# Patient Record
Sex: FEMALE | Race: OTHER | NOT HISPANIC OR LATINO | Employment: FULL TIME | URBAN - METROPOLITAN AREA
[De-identification: names, ages, dates, MRNs, and addresses within clinical notes are randomized per-mention and may not be internally consistent; named-entity substitution may affect disease eponyms.]

---

## 2017-07-25 ENCOUNTER — GENERIC CONVERSION - ENCOUNTER (OUTPATIENT)
Dept: OTHER | Facility: OTHER | Age: 42
End: 2017-07-25

## 2017-07-25 ENCOUNTER — ALLSCRIPTS OFFICE VISIT (OUTPATIENT)
Dept: OTHER | Facility: OTHER | Age: 42
End: 2017-07-25

## 2017-07-27 ENCOUNTER — TRANSCRIBE ORDERS (OUTPATIENT)
Dept: LAB | Facility: CLINIC | Age: 42
End: 2017-07-27

## 2017-07-27 ENCOUNTER — LAB (OUTPATIENT)
Dept: LAB | Facility: CLINIC | Age: 42
End: 2017-07-27
Payer: COMMERCIAL

## 2017-07-27 DIAGNOSIS — R53.83 FATIGUE, UNSPECIFIED TYPE: ICD-10-CM

## 2017-07-27 DIAGNOSIS — R53.83 FATIGUE, UNSPECIFIED TYPE: Primary | ICD-10-CM

## 2017-07-27 DIAGNOSIS — R14.0 ABDOMINAL DISTENTION: ICD-10-CM

## 2017-07-27 DIAGNOSIS — E78.1 PURE HYPERGLYCERIDEMIA: ICD-10-CM

## 2017-07-27 LAB
25(OH)D3 SERPL-MCNC: 6.9 NG/ML (ref 30–100)
ALBUMIN SERPL BCP-MCNC: 4 G/DL (ref 3.5–5)
ALP SERPL-CCNC: 60 U/L (ref 46–116)
ALT SERPL W P-5'-P-CCNC: 23 U/L (ref 12–78)
ANION GAP SERPL CALCULATED.3IONS-SCNC: 11 MMOL/L (ref 4–13)
AST SERPL W P-5'-P-CCNC: 16 U/L (ref 5–45)
BASOPHILS # BLD AUTO: 0.04 THOUSANDS/ΜL (ref 0–0.1)
BASOPHILS NFR BLD AUTO: 1 % (ref 0–1)
BILIRUB SERPL-MCNC: 0.72 MG/DL (ref 0.2–1)
BUN SERPL-MCNC: 10 MG/DL (ref 5–25)
CALCIUM SERPL-MCNC: 8.9 MG/DL (ref 8.3–10.1)
CHLORIDE SERPL-SCNC: 105 MMOL/L (ref 100–108)
CHOLEST SERPL-MCNC: 168 MG/DL (ref 50–200)
CO2 SERPL-SCNC: 24 MMOL/L (ref 21–32)
CREAT SERPL-MCNC: 0.67 MG/DL (ref 0.6–1.3)
EOSINOPHIL # BLD AUTO: 0.21 THOUSAND/ΜL (ref 0–0.61)
EOSINOPHIL NFR BLD AUTO: 3 % (ref 0–6)
ERYTHROCYTE [DISTWIDTH] IN BLOOD BY AUTOMATED COUNT: 12.9 % (ref 11.6–15.1)
GFR SERPL CREATININE-BSD FRML MDRD: 108 ML/MIN/1.73SQ M
GLUCOSE P FAST SERPL-MCNC: 103 MG/DL (ref 65–99)
HCT VFR BLD AUTO: 40.3 % (ref 34.8–46.1)
HDLC SERPL-MCNC: 38 MG/DL (ref 40–60)
HGB BLD-MCNC: 13.9 G/DL (ref 11.5–15.4)
LDLC SERPL CALC-MCNC: 73 MG/DL (ref 0–100)
LYMPHOCYTES # BLD AUTO: 2.22 THOUSANDS/ΜL (ref 0.6–4.47)
LYMPHOCYTES NFR BLD AUTO: 34 % (ref 14–44)
MCH RBC QN AUTO: 29.6 PG (ref 26.8–34.3)
MCHC RBC AUTO-ENTMCNC: 34.5 G/DL (ref 31.4–37.4)
MCV RBC AUTO: 86 FL (ref 82–98)
MONOCYTES # BLD AUTO: 0.5 THOUSAND/ΜL (ref 0.17–1.22)
MONOCYTES NFR BLD AUTO: 8 % (ref 4–12)
NEUTROPHILS # BLD AUTO: 3.55 THOUSANDS/ΜL (ref 1.85–7.62)
NEUTS SEG NFR BLD AUTO: 54 % (ref 43–75)
NRBC BLD AUTO-RTO: 0 /100 WBCS
PLATELET # BLD AUTO: 246 THOUSANDS/UL (ref 149–390)
PMV BLD AUTO: 11.6 FL (ref 8.9–12.7)
POTASSIUM SERPL-SCNC: 4.1 MMOL/L (ref 3.5–5.3)
PROT SERPL-MCNC: 7.3 G/DL (ref 6.4–8.2)
RBC # BLD AUTO: 4.7 MILLION/UL (ref 3.81–5.12)
SODIUM SERPL-SCNC: 140 MMOL/L (ref 136–145)
TRIGL SERPL-MCNC: 284 MG/DL
TSH SERPL DL<=0.05 MIU/L-ACNC: 1.76 UIU/ML (ref 0.36–3.74)
VIT B12 SERPL-MCNC: 109 PG/ML (ref 100–900)
WBC # BLD AUTO: 6.53 THOUSAND/UL (ref 4.31–10.16)

## 2017-07-27 PROCEDURE — 80061 LIPID PANEL: CPT

## 2017-07-27 PROCEDURE — 84443 ASSAY THYROID STIM HORMONE: CPT

## 2017-07-27 PROCEDURE — 80053 COMPREHEN METABOLIC PANEL: CPT

## 2017-07-27 PROCEDURE — 86255 FLUORESCENT ANTIBODY SCREEN: CPT

## 2017-07-27 PROCEDURE — 82306 VITAMIN D 25 HYDROXY: CPT

## 2017-07-27 PROCEDURE — 85025 COMPLETE CBC W/AUTO DIFF WBC: CPT

## 2017-07-27 PROCEDURE — 82607 VITAMIN B-12: CPT

## 2017-07-27 PROCEDURE — 82784 ASSAY IGA/IGD/IGG/IGM EACH: CPT

## 2017-07-27 PROCEDURE — 36415 COLL VENOUS BLD VENIPUNCTURE: CPT

## 2017-07-27 PROCEDURE — 83516 IMMUNOASSAY NONANTIBODY: CPT

## 2017-07-28 ENCOUNTER — GENERIC CONVERSION - ENCOUNTER (OUTPATIENT)
Dept: OTHER | Facility: OTHER | Age: 42
End: 2017-07-28

## 2017-07-28 LAB
ENDOMYSIUM IGA SER QL: NEGATIVE
GLIADIN PEPTIDE IGA SER-ACNC: 9 UNITS (ref 0–19)
GLIADIN PEPTIDE IGG SER-ACNC: 2 UNITS (ref 0–19)
IGA SERPL-MCNC: 191 MG/DL (ref 87–352)
TTG IGA SER-ACNC: <2 U/ML (ref 0–3)
TTG IGG SER-ACNC: <2 U/ML (ref 0–5)

## 2017-07-29 ENCOUNTER — GENERIC CONVERSION - ENCOUNTER (OUTPATIENT)
Dept: OTHER | Facility: OTHER | Age: 42
End: 2017-07-29

## 2017-09-19 ENCOUNTER — ALLSCRIPTS OFFICE VISIT (OUTPATIENT)
Dept: OTHER | Facility: OTHER | Age: 42
End: 2017-09-19

## 2018-01-10 NOTE — MISCELLANEOUS
Message  Return to work or school:   Dorene Hartman is under my professional care  She was seen in my office on 7/25/17   She is able to return to work on  7/26/17  [facility]       Signatures   Electronically signed by : Reina Belcher MD; Aug  2 2017 10:23AM EST                       (Author)    Electronically signed by : Reina Belcher MD; Aug  2 2017 10:23AM EST                       (Author)

## 2018-01-13 VITALS
HEIGHT: 64 IN | WEIGHT: 173.5 LBS | BODY MASS INDEX: 29.62 KG/M2 | SYSTOLIC BLOOD PRESSURE: 110 MMHG | HEART RATE: 58 BPM | RESPIRATION RATE: 16 BRPM | DIASTOLIC BLOOD PRESSURE: 70 MMHG | TEMPERATURE: 96.4 F

## 2018-01-13 NOTE — RESULT NOTES
Verified Results  (1) CELIAC DISEASE AB PROFILE [id number] 07:50AM Jolie Griffiths     Test Name Result Flag Reference   tTG IGG <2 U/mL  0 - 5   Negative        0 - 5                                Weak Positive   6 - 9                                Positive           >9   tTG IGA <2 U/mL  0 - 3   Negative        0 -  3                                Weak Positive   4 - 10                                Positive           >10   Tissue Transglutaminase (tTG) has been identified   as the endomysial antigen  Studies have demonstr-   ated that endomysial IgA antibodies have over 99%   specificity for gluten sensitive enteropathy     GLIADA 9 units  0 - 19   Negative                   0 - 19                     Weak Positive             20 - 30                     Moderate to Strong Positive   >30   GLIADG 2 units  0 - 19   Negative                   0 - 19                     Weak Positive             20 - 30                     Moderate to Strong Positive   >30   ENDOMYSIAL AB IGA Negative  Negative   Performed at:  [facility] [address]  [id number]  : Christian Schmitt MD, Phone:  [phone]    mg/dL  87 - 852

## 2018-01-14 VITALS
TEMPERATURE: 97.1 F | RESPIRATION RATE: 16 BRPM | BODY MASS INDEX: 30.07 KG/M2 | HEIGHT: 64 IN | DIASTOLIC BLOOD PRESSURE: 72 MMHG | HEART RATE: 62 BPM | SYSTOLIC BLOOD PRESSURE: 104 MMHG | WEIGHT: 176.13 LBS

## 2018-01-16 NOTE — RESULT NOTES
Verified Results  (1) CBC/PLT/DIFF [id number] 07:50AM Jolie Griffiths     Test Name Result Flag Reference   WBC COUNT 6 53 Thousand/uL  4 31-10 16   RBC COUNT 4 70 Million/uL  3 81-5 12   HEMOGLOBIN 13 9 g/dL  11 5-15 4   HEMATOCRIT 40 3 %  34 8-46  1   MCV 86 fL  82-98   MCH 29 6 pg  26 8-34 3   MCHC 34 5 g/dL  31 4-37 4   RDW 12 9 %  11 6-15 1   MPV 11 6 fL  8 9-12 7   PLATELET COUNT 311 Thousands/uL  149-390   nRBC AUTOMATED 0 /100 WBCs     NEUTROPHILS RELATIVE PERCENT 54 %  43-75   LYMPHOCYTES RELATIVE PERCENT 34 %  14-44   MONOCYTES RELATIVE PERCENT 8 %  4-12   EOSINOPHILS RELATIVE PERCENT 3 %  0-6   BASOPHILS RELATIVE PERCENT 1 %  0-1   NEUTROPHILS ABSOLUTE COUNT 3 55 Thousands/? ??L  1 85-7 62   LYMPHOCYTES ABSOLUTE COUNT 2 22 Thousands/? ??L  0 60-4 47   MONOCYTES ABSOLUTE COUNT 0 50 Thousand/? ??L  0 17-1 22   EOSINOPHILS ABSOLUTE COUNT 0 21 Thousand/? ??L  0 00-0 61   BASOPHILS ABSOLUTE COUNT 0 04 Thousands/? ??L  0 00-0 10   This bloodwork is non-fasting  Please drink two glasses of water morning of  bloodwork  (1) VITAMIN D 25-HYDROXY [id number] 07:50AM Jolie Griffiths     Test Name Result Flag Reference   VIT D 25-HYDROX 6 9 ng/mL L 30 0-100 0   This assay is a certified procedure of the CDC Vitamin D Standardization Certification Program (VDSCP)     Deficiency <20ng/ml   Insufficiency 20-30ng/ml   Sufficient  ng/ml     *Patients undergoing fluorescein dye angiography may retain small amounts of fluorescein in the body for 48-72 hours post procedure  Samples containing fluorescein can produce falsely elevated Vitamin D values  If the patient had this procedure, a specimen should be resubmitted post fluorescein clearance       (1) COMPREHENSIVE METABOLIC PANEL [id number] 67:63VC Radha Belch     Test Name Result Flag Reference   SODIUM 140 mmol/L  136-145   POTASSIUM 4 1 mmol/L  3 5-5 3   CHLORIDE 105 mmol/L  100-108   CARBON DIOXIDE 24 mmol/L  21-32   ANION GAP (CALC) 11 mmol/L  4-13   BLOOD UREA NITROGEN 10 mg/dL  5-25   CREATININE 0 67 mg/dL  0 60-1 30   Standardized to IDMS reference method   CALCIUM 8 9 mg/dL  8 3-10 1   BILI, TOTAL 0 72 mg/dL  0 20-1 00   ALK PHOSPHATAS 60 U/L     ALT (SGPT) 23 U/L  12-78   AST(SGOT) 16 U/L  5-45   ALBUMIN 4 0 g/dL  3 5-5 0   TOTAL PROTEIN 7 3 g/dL  6 4-8 2   eGFR 108 ml/min/1 73sq m     [facility] Disease Education Program recommendations are as follows:  GFR calculation is accurate only with a steady state creatinine  Chronic Kidney disease less than 60 ml/min/1 73 sq  meters  Kidney failure less than 15 ml/min/1 73 sq  meters  GLUCOSE FASTING 103 mg/dL H 65-99     (1) VITAMIN B12 [id number] 07:50AM Jolie Griffiths     Test Name Result Flag Reference   VITAMIN B12 109 pg/mL  100-900     (1) LIPID PANEL FASTING W DIRECT LDL REFLEX [id number] 07:50AM Jolie Griffiths     Test Name Result Flag Reference   CHOLESTEROL 168 mg/dL     LDL CHOLESTEROL CALCULATED 73 mg/dL  0-100   This is a fasting blood test  Water,black tea or black  coffee only after 9:00pm the night before test  Drink 2 glasses of water the morning of test         Triglyceride:         Normal              <150 mg/dl       Borderline High    150-199 mg/dl       High               200-499 mg/dl       Very High          >499 mg/dl  Cholesterol:         Desirable        <200 mg/dl      Borderline High  200-239 mg/dl      High             >239 mg/dl  HDL Cholesterol:        High    >59 mg/dL      Low     <41 mg/dL  LDL Cholesterol:        Optimal          <100 mg/dl        Near Optimal     100-129 mg/dl        Above Optimal          Borderline High   130-159 mg/dl          High              160-189 mg/dl          Very High        >189 mg/dl  LDL CALCULATED:    This screening LDL is a calculated result  It does not have the accuracy of the Direct Measured LDL in the monitoring of patients with hyperlipidemia and/or statin therapy     Direct Measure LDL ([id number]) must be ordered separately in these patients  TRIGLYCERIDES 284 mg/dL H <=150   Specimen collection should occur prior to N-Acetylcysteine or Metamizole administration due to the potential for falsely depressed results  HDL,DIRECT 38 mg/dL L 40-60   Specimen collection should occur prior to Metamizole administration due to the potential for falsely depressed results  (1) TSH WITH FT4 REFLEX [id number] 07:50AM Jolie Griffiths     Test Name Result Flag Reference   TSH 1 760 uIU/mL  0 358-3 740   Patients undergoing fluorescein dye angiography may retain small amounts of fluorescein in the body for 48-72 hours post procedure  Samples containing fluorescein can produce falsely depressed TSH values  If the patient had this procedure,a specimen should be resubmitted post fluorescein clearance            The recommended reference ranges for TSH during pregnancy are as follows:  First trimester 0 1 to 2 5 uIU/mL  Second trimester  0 2 to 3 0 uIU/mL  Third trimester 0 3 to 3 0 uIU/m

## 2018-01-30 ENCOUNTER — LAB (OUTPATIENT)
Dept: LAB | Facility: CLINIC | Age: 43
End: 2018-01-30

## 2018-01-30 ENCOUNTER — OFFICE VISIT (OUTPATIENT)
Dept: FAMILY MEDICINE CLINIC | Facility: CLINIC | Age: 43
End: 2018-01-30
Payer: COMMERCIAL

## 2018-01-30 VITALS
TEMPERATURE: 97.7 F | BODY MASS INDEX: 30.35 KG/M2 | SYSTOLIC BLOOD PRESSURE: 110 MMHG | HEART RATE: 82 BPM | WEIGHT: 177.8 LBS | RESPIRATION RATE: 16 BRPM | DIASTOLIC BLOOD PRESSURE: 78 MMHG | HEIGHT: 64 IN

## 2018-01-30 DIAGNOSIS — E78.1 HYPERTRIGLYCERIDEMIA: ICD-10-CM

## 2018-01-30 DIAGNOSIS — R68.89 FLU-LIKE SYMPTOMS: Primary | ICD-10-CM

## 2018-01-30 DIAGNOSIS — R73.9 ELEVATED BLOOD SUGAR: ICD-10-CM

## 2018-01-30 DIAGNOSIS — E55.9 VITAMIN D DEFICIENCY: ICD-10-CM

## 2018-01-30 LAB
25(OH)D3 SERPL-MCNC: 12.1 NG/ML (ref 30–100)
EST. AVERAGE GLUCOSE BLD GHB EST-MCNC: 137 MG/DL
HBA1C MFR BLD: 6.4 % (ref 4.2–6.3)

## 2018-01-30 PROCEDURE — 99214 OFFICE O/P EST MOD 30 MIN: CPT | Performed by: FAMILY MEDICINE

## 2018-01-30 PROCEDURE — 83036 HEMOGLOBIN GLYCOSYLATED A1C: CPT | Performed by: FAMILY MEDICINE

## 2018-01-30 PROCEDURE — 82306 VITAMIN D 25 HYDROXY: CPT | Performed by: FAMILY MEDICINE

## 2018-01-30 PROCEDURE — 36415 COLL VENOUS BLD VENIPUNCTURE: CPT | Performed by: FAMILY MEDICINE

## 2018-01-30 RX ORDER — OSELTAMIVIR PHOSPHATE 75 MG/1
75 CAPSULE ORAL EVERY 12 HOURS SCHEDULED
Qty: 10 CAPSULE | Refills: 0 | Status: SHIPPED | OUTPATIENT
Start: 2018-01-30 | End: 2018-02-03

## 2018-01-30 RX ORDER — ERGOCALCIFEROL 1.25 MG/1
CAPSULE ORAL
COMMUNITY
Start: 2017-07-28 | End: 2018-02-27 | Stop reason: SDUPTHER

## 2018-01-30 RX ORDER — TIZANIDINE 2 MG/1
TABLET ORAL
Refills: 0 | COMMUNITY
Start: 2017-11-13 | End: 2018-03-27 | Stop reason: ALTCHOICE

## 2018-01-30 RX ORDER — ACETAMINOPHEN 500 MG
500 TABLET ORAL AS NEEDED
COMMUNITY

## 2018-01-30 RX ORDER — ERGOCALCIFEROL 1.25 MG/1
CAPSULE ORAL
Refills: 0 | COMMUNITY
Start: 2017-11-10 | End: 2018-02-27 | Stop reason: SDUPTHER

## 2018-01-30 RX ORDER — IBUPROFEN 200 MG
200 TABLET ORAL EVERY 6 HOURS
COMMUNITY
End: 2018-03-27 | Stop reason: ALTCHOICE

## 2018-01-30 NOTE — ASSESSMENT & PLAN NOTE
Patient had recent wellness labs done through her work which showed an elevated non fasting blood sugar of 172  I will order an A1c  Will notify patient of results

## 2018-01-30 NOTE — ASSESSMENT & PLAN NOTE
Patient had blood work done for wellness through her work and was noted to have triglycerides were 500  I have strongly advised on limiting foods better fried, sugar, butter  I would like patient to start taking fish oil supplement, Nordic nodules, 1200 milligrams twice daily  I would like to repeat lipid panel in 3 months  Rx has been provided  I would also like patient to start routine exercise regimen as well  Return parameters discussed

## 2018-01-30 NOTE — PROGRESS NOTES
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 [employer]  She was started on Tamiflu however has been only taking 1 tablet daily  I will call in a new Rx for patient to take twice daily for 5 days  Patient is agreeable with this plan  Continue with symptomatic treatment and supportive measures including adequate hydration  Return parameters discussed  Relevant Medications    oseltamivir (TAMIFLU) 75 mg capsule    Hypertriglyceridemia       Patient had blood work done for wellness through her work and was noted to have triglycerides were 500  I have strongly advised on limiting foods better fried, sugar, butter  I would like patient to start taking fish oil supplement, Nordic nodules, 1200 milligrams twice daily  I would like to repeat lipid panel in 3 months  Rx has been provided  I would also like patient to start routine exercise regimen as well  Return parameters discussed  Relevant Orders    Lipid Panel with Direct LDL reflex    Elevated blood sugar       Patient had recent wellness labs done through her work which showed an elevated non fasting blood sugar of 172  I will order an A1c  Will notify patient of results  Relevant Orders    Hemoglobin A1c (Completed)    Vitamin D deficiency       Patient has history of vitamin-D deficiency, will recheck vitamin-D level  Relevant Orders    Vitamin D 25 hydroxy (Completed)            There are no Patient Instructions on file for this visit          Chief Complaint     Chief Complaint   Patient presents with    Flu Symptoms     started thursday night       History of Present Illness     HPI   Patient presents with symptoms of runny nose, fever, sneezing, body aches, decreased appetite, nasal congestion  Took robitussin, tyelenol  Works at [employer], has been exposed to flu  Was started on tamiflu 2 days ago, taking 1 capsule daily   patient also states she had wellness blood work done last week through her work which showed elevated triglycerides and blood sugar  She is concerned about this  In addition, patient changed jobs last year and during work physical, was noted to have change in PPD status  Patient did have an x-ray of the chest which was normal   Patient did see [facility] who stated she would be okay either taking medication or not  Patient did take 1 month of TB meds however could not tolerate side effects and discontinued  Patient was told it was okay that she discontinued the medications  Review of Systems   Review of Systems   Constitutional: Positive for appetite change and fever  Negative for activity change  HENT: Positive for congestion, rhinorrhea and sneezing  Musculoskeletal: Positive for myalgias  All other systems reviewed and are negative  Active Problem List     Patient Active Problem List   Diagnosis    Flu-like symptoms    Hypertriglyceridemia    Elevated blood sugar    Vitamin D deficiency       Past Medical History:  No past medical history on file  Past Surgical History:  Past Surgical History:   Procedure Laterality Date     SECTION      CHOLECYSTECTOMY      TOTAL ABDOMINAL HYSTERECTOMY      Due to fibroids  Ovaries present       Family History:  Family History   Problem Relation Age of Onset    Diabetes Mother     Hypertension Mother     Heart attack Mother     Stroke Father        Social History:  Social History     Social History    Marital status: /Civil Union     Spouse name: N/A    Number of children: N/A    Years of education: N/A     Occupational History    Not on file       Social History Main Topics    Smoking status: Never Smoker    Smokeless tobacco: Not on file    Alcohol use No    Drug use: Unknown    Sexual activity: Not on file     Other Topics Concern    Not on file     Social History Narrative    Caffeine use    Description: Tea 1 cup           Objective     Vitals:    01/30/18 1105   BP: 110/78   Pulse: 82   Resp: 16   Temp: 97 7 °F (36 5 °C)     Wt Readings from Last 3 Encounters:   01/30/18 80 6 kg (177 lb 12 8 oz)   09/19/17 79 9 kg (176 lb 2 1 oz)   07/25/17 78 7 kg (173 lb 8 oz)       Physical Exam   Constitutional: She is oriented to person, place, and time  She appears well-developed and well-nourished  HENT:   Head: Normocephalic and atraumatic  Mouth/Throat: No oropharyngeal exudate  Mild erythema noted in posterior oropharynx  Postnasal drainage noted as well  Cardiovascular: Normal rate and regular rhythm  Pulmonary/Chest: Effort normal and breath sounds normal  She has no wheezes  Lymphadenopathy:     She has no cervical adenopathy  Neurological: She is alert and oriented to person, place, and time  Skin: No rash noted         Pertinent Laboratory/Diagnostic Studies:  Lab Results   Component Value Date    BUN 10 07/27/2017    CREATININE 0 67 07/27/2017    CALCIUM 8 9 07/27/2017     07/27/2017    K 4 1 07/27/2017    CO2 24 07/27/2017     07/27/2017     Lab Results   Component Value Date    ALT 23 07/27/2017    AST 16 07/27/2017    ALKPHOS 60 07/27/2017    BILITOT 0 72 07/27/2017       Lab Results   Component Value Date    WBC 6 53 07/27/2017    HGB 13 9 07/27/2017    HCT 40 3 07/27/2017    MCV 86 07/27/2017     07/27/2017       No results found for: TSH    Lab Results   Component Value Date    CHOL 168 07/27/2017     Lab Results   Component Value Date    TRIG 284 (H) 07/27/2017     Lab Results   Component Value Date    HDL 38 (L) 07/27/2017     Lab Results   Component Value Date    LDLCALC 73 07/27/2017     Lab Results   Component Value Date    HGBA1C 6 4 (H) 01/30/2018       Results for orders placed or performed in visit on 01/30/18   Hemoglobin A1c   Result Value Ref Range    Hemoglobin A1C 6 4 (H) 4 2 - 6 3 %     mg/dl   Vitamin D 25 hydroxy   Result Value Ref Range    Vit D, 25-Hydroxy 12 1 (L) 30 0 - 100 0 ng/mL       Orders Placed This Encounter   Procedures    Lipid Panel with Direct LDL reflex    Hemoglobin A1c    Vitamin D 25 hydroxy       ALLERGIES:  Allergies   Allergen Reactions    Pollen Extract Itching     Itchy, runny nose       Current Medications     Current Outpatient Prescriptions   Medication Sig Dispense Refill    acetaminophen (TYLENOL) 500 mg tablet Take 500 mg by mouth every 6 (six) hours      ergocalciferol (VITAMIN D2) 50,000 units TAKE 1 CAPSULE WEEKLY FOR 8 WEEKS THEN MONTHLY FOR 2 MONTHS  0    ibuprofen (MOTRIN) 200 mg tablet Take 200 mg by mouth every 6 (six) hours      ergocalciferol (VITAMIN D2) 50,000 units Take by mouth      oseltamivir (TAMIFLU) 75 mg capsule Take 1 capsule (75 mg total) by mouth every 12 (twelve) hours for 4 days 10 capsule 0    tiZANidine (ZANAFLEX) 2 mg tablet TAKE 1 TABLET BY MOUTH NIGHTLY FOR 15 DAYS  0     No current facility-administered medications for this visit  Health Maintenance   There are no preventive care reminders to display for this patient    Immunization History   Administered Date(s) Administered    Fluzone Split Quad 0 5 mL 11/09/2015    Td (adult), adsorbed 03/09/2016    Tdap 01/01/2016       Ivy Olea MD

## 2018-01-30 NOTE — ASSESSMENT & PLAN NOTE
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

## 2018-02-02 ENCOUNTER — TELEPHONE (OUTPATIENT)
Dept: FAMILY MEDICINE CLINIC | Facility: CLINIC | Age: 43
End: 2018-02-02

## 2018-02-02 DIAGNOSIS — E55.9 VITAMIN D DEFICIENCY: Primary | ICD-10-CM

## 2018-02-02 DIAGNOSIS — R73.01 IMPAIRED FASTING GLUCOSE: ICD-10-CM

## 2018-02-02 RX ORDER — ERGOCALCIFEROL 1.25 MG/1
50000 CAPSULE ORAL WEEKLY
Qty: 4 CAPSULE | Refills: 2 | Status: SHIPPED | OUTPATIENT
Start: 2018-02-02 | End: 2018-03-27 | Stop reason: SDUPTHER

## 2018-02-02 NOTE — PROGRESS NOTES
Can you please let patient know that her vitamin-D was in the deficient range  I will call in a prescription to her pharmacy to start taking the vitamin-D supplementation  I would like to recheck the vitamin-D level in 3 months and will print this out  In addition, her blood sugar was in the prediabetic range  Normal is below  5 7 and her number is 6 4  I would like patient to limit her intake of carbohydrates, sweets and starting routine exercise regimen  I would like to repeat her hemoglobin A1c in the next 4 months along with the vitamin D  I will print this out    Thank you

## 2018-02-02 NOTE — TELEPHONE ENCOUNTER
----- Message from Keyla Sanchez MD sent at 2/2/2018  4:50 PM EST -----  Can you please let patient know that her vitamin-D was in the deficient range  I will call in a prescription to her pharmacy to start taking the vitamin-D supplementation  I would like to recheck the vitamin-D level in 3 months and will print this out  In addition, her blood sugar was in the prediabetic range  Normal is below  5 7 and her number is 6 4  I would like patient to limit her intake of carbohydrates, sweets and starting routine exercise regimen  I would like to repeat her hemoglobin A1c in the next 4 months along with the vitamin D  I will print this out    Thank you

## 2018-02-21 ENCOUNTER — HOSPITAL ENCOUNTER (EMERGENCY)
Facility: HOSPITAL | Age: 43
Discharge: HOME | End: 2018-02-21
Attending: EMERGENCY MEDICINE
Payer: COMMERCIAL

## 2018-02-21 ENCOUNTER — APPOINTMENT (EMERGENCY)
Dept: RADIOLOGY | Facility: HOSPITAL | Age: 43
End: 2018-02-21
Payer: COMMERCIAL

## 2018-02-21 VITALS
HEART RATE: 67 BPM | BODY MASS INDEX: 30.9 KG/M2 | TEMPERATURE: 98.8 F | WEIGHT: 180 LBS | SYSTOLIC BLOOD PRESSURE: 122 MMHG | RESPIRATION RATE: 18 BRPM | OXYGEN SATURATION: 98 % | DIASTOLIC BLOOD PRESSURE: 71 MMHG

## 2018-02-21 DIAGNOSIS — R07.9 CHEST PAIN: Primary | ICD-10-CM

## 2018-02-21 LAB
ALBUMIN SERPL BCP-MCNC: 4 G/DL (ref 3.5–5)
ALP SERPL-CCNC: 58 U/L (ref 46–116)
ALT SERPL W P-5'-P-CCNC: 26 U/L (ref 12–78)
ANION GAP SERPL CALCULATED.3IONS-SCNC: 6 MMOL/L (ref 4–13)
AST SERPL W P-5'-P-CCNC: 11 U/L (ref 5–45)
ATRIAL RATE: 72 BPM
BASOPHILS # BLD AUTO: 0.02 THOUSANDS/ΜL (ref 0–0.1)
BASOPHILS NFR BLD AUTO: 0 % (ref 0–1)
BILIRUB SERPL-MCNC: 0.73 MG/DL (ref 0.2–1)
BUN SERPL-MCNC: 11 MG/DL (ref 5–25)
CALCIUM SERPL-MCNC: 8.8 MG/DL (ref 8.3–10.1)
CHLORIDE SERPL-SCNC: 103 MMOL/L (ref 100–108)
CO2 SERPL-SCNC: 26 MMOL/L (ref 21–32)
CREAT SERPL-MCNC: 0.71 MG/DL (ref 0.6–1.3)
EOSINOPHIL # BLD AUTO: 0.23 THOUSAND/ΜL (ref 0–0.61)
EOSINOPHIL NFR BLD AUTO: 3 % (ref 0–6)
ERYTHROCYTE [DISTWIDTH] IN BLOOD BY AUTOMATED COUNT: 12.3 % (ref 11.6–15.1)
GFR SERPL CREATININE-BSD FRML MDRD: 104 ML/MIN/1.73SQ M
GLUCOSE SERPL-MCNC: 211 MG/DL (ref 65–140)
HCT VFR BLD AUTO: 39.5 % (ref 34.8–46.1)
HGB BLD-MCNC: 14.4 G/DL (ref 11.5–15.4)
LYMPHOCYTES # BLD AUTO: 2.23 THOUSANDS/ΜL (ref 0.6–4.47)
LYMPHOCYTES NFR BLD AUTO: 32 % (ref 14–44)
MCH RBC QN AUTO: 29.8 PG (ref 26.8–34.3)
MCHC RBC AUTO-ENTMCNC: 36.5 G/DL (ref 31.4–37.4)
MCV RBC AUTO: 82 FL (ref 82–98)
MONOCYTES # BLD AUTO: 0.46 THOUSAND/ΜL (ref 0.17–1.22)
MONOCYTES NFR BLD AUTO: 7 % (ref 4–12)
NEUTROPHILS # BLD AUTO: 3.94 THOUSANDS/ΜL (ref 1.85–7.62)
NEUTS SEG NFR BLD AUTO: 58 % (ref 43–75)
NRBC BLD AUTO-RTO: 0 /100 WBCS
P AXIS: 76 DEGREES
PLATELET # BLD AUTO: 223 THOUSANDS/UL (ref 149–390)
PMV BLD AUTO: 10.3 FL (ref 8.9–12.7)
POTASSIUM SERPL-SCNC: 3.7 MMOL/L (ref 3.5–5.3)
PR INTERVAL: 142 MS
PROT SERPL-MCNC: 7.8 G/DL (ref 6.4–8.2)
QRS AXIS: 89 DEGREES
QRSD INTERVAL: 90 MS
QT INTERVAL: 406 MS
QTC INTERVAL: 444 MS
RBC # BLD AUTO: 4.83 MILLION/UL (ref 3.81–5.12)
SODIUM SERPL-SCNC: 135 MMOL/L (ref 136–145)
T WAVE AXIS: 70 DEGREES
TROPONIN I SERPL-MCNC: <0.02 NG/ML
VENTRICULAR RATE: 72 BPM
WBC # BLD AUTO: 6.89 THOUSAND/UL (ref 4.31–10.16)

## 2018-02-21 PROCEDURE — 99285 EMERGENCY DEPT VISIT HI MDM: CPT

## 2018-02-21 PROCEDURE — 93010 ELECTROCARDIOGRAM REPORT: CPT | Performed by: INTERNAL MEDICINE

## 2018-02-21 PROCEDURE — 93005 ELECTROCARDIOGRAM TRACING: CPT | Performed by: EMERGENCY MEDICINE

## 2018-02-21 PROCEDURE — 36415 COLL VENOUS BLD VENIPUNCTURE: CPT

## 2018-02-21 PROCEDURE — 85025 COMPLETE CBC W/AUTO DIFF WBC: CPT | Performed by: EMERGENCY MEDICINE

## 2018-02-21 PROCEDURE — 84484 ASSAY OF TROPONIN QUANT: CPT | Performed by: EMERGENCY MEDICINE

## 2018-02-21 PROCEDURE — 71046 X-RAY EXAM CHEST 2 VIEWS: CPT

## 2018-02-21 PROCEDURE — 80053 COMPREHEN METABOLIC PANEL: CPT | Performed by: EMERGENCY MEDICINE

## 2018-02-21 RX ORDER — KETOROLAC TROMETHAMINE 30 MG/ML
15 INJECTION, SOLUTION INTRAMUSCULAR; INTRAVENOUS ONCE
Status: DISCONTINUED | OUTPATIENT
Start: 2018-02-21 | End: 2018-02-21 | Stop reason: HOSPADM

## 2018-02-21 NOTE — DISCHARGE INSTRUCTIONS
Your testing including EKG, chest xray, and blood work were normal  Please follow up with your primary doctor for further management  Return to the ER for new or worrisome symptoms  Chest Pain   WHAT YOU NEED TO KNOW:   Chest pain can be caused by a range of conditions, from not serious to life-threatening  Chest pain can be a symptom of a digestive problem, such as acid reflux or a stomach ulcer  An anxiety attack or a strong emotion, such as anger, can also cause chest pain  Infection, inflammation, or a fracture in the bones or cartilage in your chest can cause pain or discomfort  Sometimes chest pain or pressure is caused by poor blood flow to your heart (angina)  Chest pain may also be caused by life-threatening conditions such as a heart attack or blood clot in your lungs  DISCHARGE INSTRUCTIONS:   Call 911 if:   · You have any of the following signs of a heart attack:      ¨ Squeezing, pressure, or pain in your chest that lasts longer than 5 minutes or returns    ¨ Discomfort or pain in your back, neck, jaw, stomach, or arm     ¨ Trouble breathing    ¨ Nausea or vomiting    ¨ Lightheadedness or a sudden cold sweat, especially with chest pain or trouble breathing    Return to the emergency department if:   · You have chest discomfort that gets worse, even with medicine  · You cough or vomit blood  · Your bowel movements are black or bloody  · You cannot stop vomiting, or it hurts to swallow  Contact your healthcare provider if:   · You have questions or concerns about your condition or care  Medicines:   · Medicines  may be given to treat the cause of your chest pain  Examples include pain medicine, anxiety medicine, or medicines to increase blood flow to your heart  · Do not take certain medicines without asking your healthcare provider first   These include NSAIDs, herbal or vitamin supplements, or hormones (estrogen or progestin)  · Take your medicine as directed    Contact your healthcare provider if you think your medicine is not helping or if you have side effects  Tell him or her if you are allergic to any medicine  Keep a list of the medicines, vitamins, and herbs you take  Include the amounts, and when and why you take them  Bring the list or the pill bottles to follow-up visits  Carry your medicine list with you in case of an emergency  Follow up with your healthcare provider within 72 hours, or as directed: You may need to return for more tests to find the cause of your chest pain  You may be referred to a specialist, such as a cardiologist or gastroenterologist  Write down your questions so you remember to ask them during your visits  Healthy living tips: The following are general healthy guidelines  If your chest pain is caused by a heart problem, your healthcare provider will give you specific guidelines to follow  · Do not smoke  Nicotine and other chemicals in cigarettes and cigars can cause lung and heart damage  Ask your healthcare provider for information if you currently smoke and need help to quit  E-cigarettes or smokeless tobacco still contain nicotine  Talk to your healthcare provider before you use these products  · Eat a variety of healthy, low-fat foods  Healthy foods include fruits, vegetables, whole-grain breads, low-fat dairy products, beans, lean meats, and fish  Ask for more information about a heart healthy diet  · Ask about activity  Your healthcare provider will tell you which activities to limit or avoid  Ask when you can drive, return to work, and have sex  Ask about the best exercise plan for you  · Maintain a healthy weight  Ask your healthcare provider how much you should weigh  Ask him or her to help you create a weight loss plan if you are overweight  · Get the flu and pneumonia vaccines  All adults should get the influenza (flu) vaccine  Get it every year as soon as it becomes available   The pneumococcal vaccine is given to adults aged 72 years or older  The vaccine is given every 5 years to prevent pneumococcal disease, such as pneumonia  © 2017 [employer] Information is for End User's use only and may not be sold, redistributed or otherwise used for commercial purposes  All illustrations and images included in CareNotes® are the copyrighted property of A [employer]  or [employer]  The above information is an  only  It is not intended as medical advice for individual conditions or treatments  Talk to your doctor, nurse or pharmacist before following any medical regimen to see if it is safe and effective for you

## 2018-02-21 NOTE — ED NOTES
EKG completed at 10:08, viewed and signed by Dr Wilfred Camarillo, copy on chart     [facility]  02/21/18 101

## 2018-02-21 NOTE — ED ATTENDING ATTESTATION
Jeanmarie Mills MD, saw and evaluated the patient  I have discussed the patient with the resident/non-physician practitioner and agree with the resident's/non-physician practitioner's findings, Plan of Care, and MDM as documented in the resident's/non-physician practitioner's note, except where noted  All available labs and Radiology studies were reviewed  At this point I agree with the current assessment done in the Emergency Department  I have conducted an independent evaluation of this patient a history and physical is as follows: This is a 76-year-old woman presenting to the emergency department with chest discomfort, some left-sided numbness  Symptoms have been present for about a month  Patient has not had any difficulty walking  She does not have exertional symptoms  She describes the pain as burning  She does not have cardiac risk factors  She does not have PE risk factors  Her review of systems otherwise negative in 12 systems were reviewed  On exam Vital signs were reviewed  Patient is awake, alert, interactive  The patient's pupils are equally round reactive to light  Oropharynx is clear with moist mucous membranes  Neck is supple and nontender with no adenopathy or JVD  Heart is regular with no murmurs, rubs, or gallops  Lungs are clear and equal with no wheezes, rales, or rhonchi  Abdomen is soft and nontender with no masses, rebound, or guarding  There is no CVA tenderness  The patient was completely exposed  There is no skin breakdown  There are no rashes or skin changes  Extremities are warm and well perfused with good pulses  The patient has normal strength, sensation, and cranial nerves  Patient's EKG was reviewed by me, no ischemia or ectopy  Impression:  Atypical chest pain    Agree with documentation      Critical Care Time  CritCare Time    Procedures

## 2018-02-21 NOTE — ED NOTES
Dr James Canonn and Dr Charlie Rose at pt bedside for pt evaluation     Debi Barros RN  40/06/32 [id number]

## 2018-02-21 NOTE — ED PROVIDER NOTES
History  Chief Complaint   Patient presents with    Chest Pain     pt c/o cp x 1 week getting worse now     41-year-old woman with a history of hyperlipidemia presents for evaluation of chest pain  Patient notes a fleeting substernal chest pressure approximately 1 week ago without inciting trauma or strenuous activity  Symptoms resolved that intervention before recurring this morning with associated lightheadedness and left jaw pain  No associated nausea, dyspnea, or diaphoresis  No history of cardiac or VTE disease  No smoking or drug use  No significant family history of cardiac disease  Patient is perc negative with no VTE risk factors  Patient also complaining of left-sided weakness that has been present for months  No associated neurologic symptoms including facial asymmetry, dysarthria, diplopia, dysphagia, and dysequilibrium  On arrival, patient is afebrile with otherwise normal vital signs  Physical exam shows reproduction of chest pain with chest wall palpation and is otherwise unremarkable including a nonfocal neurologic exam   Low suspicion for cardiac disease and VTE  Will check EKG, troponin, chest x-ray, basic labs, and urine pregnancy  Will treat symptomatically with Toradol and reassess  Further management pending above results  Prior to Admission Medications   Prescriptions Last Dose Informant Patient Reported?  Taking?   acetaminophen (TYLENOL) 500 mg tablet   Yes No   Sig: Take 500 mg by mouth every 6 (six) hours   ergocalciferol (VITAMIN D2) 50,000 units   Yes No   Sig: TAKE 1 CAPSULE WEEKLY FOR 8 WEEKS THEN MONTHLY FOR 2 MONTHS   ergocalciferol (VITAMIN D2) 50,000 units   Yes No   Sig: Take by mouth   ergocalciferol (VITAMIN D2) 50,000 units   No No   Sig: Take 1 capsule (50,000 Units total) by mouth once a week for 4 doses   ibuprofen (MOTRIN) 200 mg tablet   Yes No   Sig: Take 200 mg by mouth every 6 (six) hours   tiZANidine (ZANAFLEX) 2 mg tablet   Yes No   Sig: TAKE 1 TABLET BY MOUTH NIGHTLY FOR 15 DAYS      Facility-Administered Medications: None       History reviewed  No pertinent past medical history  Past Surgical History:   Procedure Laterality Date     SECTION      CHOLECYSTECTOMY      TOTAL ABDOMINAL HYSTERECTOMY      Due to fibroids  Ovaries present       Family History   Problem Relation Age of Onset    Diabetes Mother     Hypertension Mother     Heart attack Mother     Stroke Father      I have reviewed and agree with the history as documented  Social History   Substance Use Topics    Smoking status: Never Smoker    Smokeless tobacco: Never Used    Alcohol use No        Review of Systems   Constitutional: Negative for chills and fever  HENT: Negative for rhinorrhea and sore throat  Eyes: Negative for photophobia and visual disturbance  Respiratory: Negative for cough and shortness of breath  Cardiovascular: Positive for chest pain and palpitations  Negative for leg swelling  Gastrointestinal: Negative for abdominal pain, diarrhea, nausea and vomiting  Genitourinary: Negative for dysuria, frequency and hematuria  Musculoskeletal: Negative for back pain and neck pain  Skin: Negative for rash and wound  Neurological: Positive for light-headedness  Negative for headaches  Physical Exam  ED Triage Vitals   Temperature Pulse Respirations Blood Pressure SpO2   18 0955 18 0955 18 0955 18 0955 18 0955   98 8 °F (37 1 °C) 89 18 121/62 96 %      Temp Source Heart Rate Source Patient Position - Orthostatic VS BP Location FiO2 (%)   18 0955 18 0955 18 1057 18 1057 --   Oral Monitor Lying Right arm       Pain Score       18 0955       8           Orthostatic Vital Signs  Vitals:    18 0955 18 1057   BP: 121/62 122/71   Pulse: 89 67   Patient Position - Orthostatic VS:  Lying       Physical Exam   Constitutional: She is oriented to person, place, and time   She appears well-developed and well-nourished  No distress  HENT:   Head: Normocephalic and atraumatic  Eyes: Conjunctivae and EOM are normal  Pupils are equal, round, and reactive to light  No scleral icterus  Neck: Neck supple  No JVD present  Cardiovascular: Normal rate, regular rhythm and normal heart sounds  Exam reveals no gallop and no friction rub  No murmur heard  Pulmonary/Chest: Effort normal and breath sounds normal  No respiratory distress  She has no wheezes  She has no rales  She exhibits tenderness (With reproduction of symptoms)  Abdominal: Soft  She exhibits no distension  There is no tenderness  There is no rebound and no guarding  Musculoskeletal: She exhibits no edema or tenderness  Neurological: She is alert and oriented to person, place, and time  No cranial nerve deficit  No gross motor or sensory deficits, visual fields intact, normal cerebellar testing   Skin: Skin is warm and dry  She is not diaphoretic  Psychiatric: She has a normal mood and affect  Her behavior is normal  Thought content normal    Vitals reviewed  ED Medications  Medications - No data to display    Diagnostic Studies  Results Reviewed     Procedure Component Value Units Date/Time    Troponin I [[id number]]  (Normal) Collected:  02/21/18 1005    Lab Status:  Final result Specimen:  Blood from Arm, Right Updated:  02/21/18 1040     Troponin I <0 02 ng/mL     Narrative:         [employer] Chemistry analyzer 99% cutoff is > 0 04 ng/mL in network labs    o cTnI 99% cutoff is useful only when applied to patients in the clinical setting of myocardial ischemia  o cTnI 99% cutoff should be interpreted in the context of clinical history, ECG findings and possibly cardiac imaging to establish correct diagnosis  o cTnI 99% cutoff may be suggestive but clearly not indicative of a coronary event without the clinical setting of myocardial ischemia      Comprehensive metabolic panel [[id number]]  (Abnormal) Collected: 02/21/18 1005    Lab Status:  Final result Specimen:  Blood from Arm, Right Updated:  02/21/18 1038     Sodium 135 (L) mmol/L      Potassium 3 7 mmol/L      Chloride 103 mmol/L      CO2 26 mmol/L      Anion Gap 6 mmol/L      BUN 11 mg/dL      Creatinine 0 71 mg/dL      Glucose 211 (H) mg/dL      Calcium 8 8 mg/dL      AST 11 U/L      ALT 26 U/L      Alkaline Phosphatase 58 U/L      Total Protein 7 8 g/dL      Albumin 4 0 g/dL      Total Bilirubin 0 73 mg/dL      eGFR 104 ml/min/1 73sq m     Narrative:         National Kidney Disease Education Program recommendations are as follows:  GFR calculation is accurate only with a steady state creatinine  Chronic Kidney disease less than 60 ml/min/1 73 sq  meters  Kidney failure less than 15 ml/min/1 73 sq  meters  CBC and differential [[id number]]  (Normal) Collected:  02/21/18 1005    Lab Status:  Final result Specimen:  Blood from Arm, Right Updated:  02/21/18 1013     WBC 6 89 Thousand/uL      RBC 4 83 Million/uL      Hemoglobin 14 4 g/dL      Hematocrit 39 5 %      MCV 82 fL      MCH 29 8 pg      MCHC 36 5 g/dL      RDW 12 3 %      MPV 10 3 fL      Platelets 242 Thousands/uL      nRBC 0 /100 WBCs      Neutrophils Relative 58 %      Lymphocytes Relative 32 %      Monocytes Relative 7 %      Eosinophils Relative 3 %      Basophils Relative 0 %      Neutrophils Absolute 3 94 Thousands/µL      Lymphocytes Absolute 2 23 Thousands/µL      Monocytes Absolute 0 46 Thousand/µL      Eosinophils Absolute 0 23 Thousand/µL      Basophils Absolute 0 02 Thousands/µL                  X-ray chest 2 views   Final Result by Yossi Hernández MD (02/21 1107)      No acute cardiopulmonary disease              Workstation performed: [id number]               Procedures  Procedures      Phone Consults  ED Phone Contact    ED Course  ED Course as of Feb 23 0748 Wed Feb 21, 2018   1135 Procedure Note: EKG  Date/Time: 02/21/18 11:35 AM   Indications / Diagnosis: CP  ECG reviewed by me, the ED Provider: yes   The EKG demonstrates:  Rhythm: normal sinus  Intervals: normal intervals  Axis: normal axis  QRS/Blocks: normal QRS  ST Changes: No acute ST Changes, no STD/SHANKAR                                   MDM  Number of Diagnoses or Management Options  Chest pain:   Diagnosis management comments: No focal neurologic deficits on examination  No concern for CVA at this time  Very low suspicion for ACS  PERC negative  Workup including EKG, chest xray, troponin, basic labs, and urine pregnancy unremarkable  Possibly musculoskeletal with costochondritis in etiology  Patient was given Toradol and discharged with outpatient follow up  CritCare Time    Disposition  Final diagnoses:   Chest pain     Time reflects when diagnosis was documented in both MDM as applicable and the Disposition within this note     Time User Action Codes Description Comment    2/21/2018 11:42 AM Zara Aldrich Just Add [R07 9] Chest pain       ED Disposition     ED Disposition Condition Comment    Discharge  Katy Llanesorman discharge to home/self care      Condition at discharge: Good        Follow-up Information     Follow up With Specialties Details Why Gus Larsen MD Family Medicine Schedule an appointment as soon as possible for a visit For further management [address] [place] [address]  [phone]          Discharge Medication List as of 2/21/2018 11:45 AM      CONTINUE these medications which have NOT CHANGED    Details   acetaminophen (TYLENOL) 500 mg tablet Take 500 mg by mouth every 6 (six) hours, Historical Med      !! ergocalciferol (VITAMIN D2) 50,000 units TAKE 1 CAPSULE WEEKLY FOR 8 WEEKS THEN MONTHLY FOR 2 MONTHS, Historical Med      !! ergocalciferol (VITAMIN D2) 50,000 units Take by mouth, Starting Fri 7/28/2017, Historical Med      !! ergocalciferol (VITAMIN D2) 50,000 units Take 1 capsule (50,000 Units total) by mouth once a week for 4 doses, Starting Fri 2/2/2018, Until Sat 2/24/2018, Normal      ibuprofen (MOTRIN) 200 mg tablet Take 200 mg by mouth every 6 (six) hours, Historical Med      tiZANidine (ZANAFLEX) 2 mg tablet TAKE 1 TABLET BY MOUTH NIGHTLY FOR 15 DAYS, Historical Med       !! - Potential duplicate medications found  Please discuss with provider  No discharge procedures on file  ED Provider  Attending physically available and evaluated Elo Josi  I managed the patient along with the ED Attending      Electronically Signed by         Michelle Harvey MD  02/23/18 0949

## 2018-02-23 ENCOUNTER — LAB (OUTPATIENT)
Dept: LAB | Facility: CLINIC | Age: 43
End: 2018-02-23
Payer: COMMERCIAL

## 2018-02-23 ENCOUNTER — OFFICE VISIT (OUTPATIENT)
Dept: FAMILY MEDICINE CLINIC | Facility: CLINIC | Age: 43
End: 2018-02-23
Payer: COMMERCIAL

## 2018-02-23 VITALS
SYSTOLIC BLOOD PRESSURE: 116 MMHG | HEIGHT: 64 IN | HEART RATE: 82 BPM | BODY MASS INDEX: 31.38 KG/M2 | TEMPERATURE: 97.5 F | DIASTOLIC BLOOD PRESSURE: 62 MMHG | WEIGHT: 183.8 LBS

## 2018-02-23 DIAGNOSIS — F41.1 GENERALIZED ANXIETY DISORDER: ICD-10-CM

## 2018-02-23 DIAGNOSIS — G47.00 INSOMNIA, UNSPECIFIED TYPE: ICD-10-CM

## 2018-02-23 DIAGNOSIS — R07.89 ATYPICAL CHEST PAIN: Primary | ICD-10-CM

## 2018-02-23 DIAGNOSIS — R00.2 PALPITATIONS: ICD-10-CM

## 2018-02-23 DIAGNOSIS — R53.83 FATIGUE, UNSPECIFIED TYPE: ICD-10-CM

## 2018-02-23 DIAGNOSIS — R73.01 IMPAIRED FASTING GLUCOSE: ICD-10-CM

## 2018-02-23 DIAGNOSIS — M54.2 NECK PAIN ON LEFT SIDE: ICD-10-CM

## 2018-02-23 LAB
FERRITIN SERPL-MCNC: 305 NG/ML (ref 8–388)
IRON SERPL-MCNC: 110 UG/DL (ref 50–170)
VIT B12 SERPL-MCNC: 144 PG/ML (ref 100–900)

## 2018-02-23 PROCEDURE — 36415 COLL VENOUS BLD VENIPUNCTURE: CPT

## 2018-02-23 PROCEDURE — 99214 OFFICE O/P EST MOD 30 MIN: CPT | Performed by: FAMILY MEDICINE

## 2018-02-23 PROCEDURE — 82728 ASSAY OF FERRITIN: CPT

## 2018-02-23 PROCEDURE — 82607 VITAMIN B-12: CPT

## 2018-02-23 PROCEDURE — 83540 ASSAY OF IRON: CPT

## 2018-02-23 RX ORDER — LORAZEPAM 1 MG/1
0.5 TABLET ORAL
Qty: 30 TABLET | Refills: 0 | Status: SHIPPED | OUTPATIENT
Start: 2018-02-23 | End: 2018-03-27 | Stop reason: ALTCHOICE

## 2018-02-23 NOTE — ASSESSMENT & PLAN NOTE
Patient does have generalized anxiety and has difficulty sleeping  Patient was previously on lorazepam and to get only as needed  She is requesting a prescription  I have discussed the addictive potential for this type of medication, patient is aware of this  I will prescribe this however have only advised to take it only when she needs it

## 2018-02-23 NOTE — ASSESSMENT & PLAN NOTE
Most recent A1c noted to be 6 4  Patient has been watching what she eats  I have strongly urged advised on limiting carbohydrates, sweets, sugars in the diet  I would like patient to start a routine exercise regimen and work on weight loss  Patient is agreeable with this    Will recheck A1c in the springtime

## 2018-02-23 NOTE — ASSESSMENT & PLAN NOTE
I feel patient has musculoskeletal strain / spasm of the neck as  Well as trapezius region  I feel patient would benefit from physical therapy as well as chiropractic care  Referrals have been provided  May apply heat to the region as well

## 2018-02-23 NOTE — ASSESSMENT & PLAN NOTE
Will check iron studies as well as vitamin B12 as patient did have  Low vitamin B12 in her previous blood work  Patient is agreeable with this

## 2018-02-23 NOTE — PROGRESS NOTES
FAMILY PRACTICE OFFICE VISIT       NAME: Tabitha Tellez  AGE: 39 y o  SEX: female       : 1975        MRN: [id number]    DATE: 2018  TIME: 4:39 PM    Assessment and Plan     Problem List Items Addressed This Visit     Atypical chest pain - Primary      Patient presents for follow-up of recent ER visit for chest pain  It appears patient's chest pain is atypical and reproducible  Because patient is prediabetic and has history of hypertriglyceridemia, I would like to have patient be further evaluated by Cardiology, obtain Holter as well as echo stress test   Patient is agreeable with this  Return/ ER parameters discussed  Relevant Orders    Echo stress test w contrast if indicated    Palpitations    Relevant Orders    Holter monitor - 24 hour    Neck pain on left side      I feel patient has musculoskeletal strain / spasm of the neck as  Well as trapezius region  I feel patient would benefit from physical therapy as well as chiropractic care  Referrals have been provided  May apply heat to the region as well  Relevant Orders    Ambulatory referral to Cardiology    Ambulatory referral to Physical Therapy    Generalized anxiety disorder       Patient does have generalized anxiety and has difficulty sleeping  Patient was previously on lorazepam and to get only as needed  She is requesting a prescription  I have discussed the addictive potential for this type of medication, patient is aware of this  I will prescribe this however have only advised to take it only when she needs it  Relevant Medications    LORazepam (ATIVAN) 1 mg tablet    Insomnia       Have discussed sleep hygiene habits,   Breathing techniques  May take lorazepam only as needed  Relevant Medications    LORazepam (ATIVAN) 1 mg tablet    Impaired fasting glucose       Most recent A1c noted to be 6 4  Patient has been watching what she eats    I have strongly urged advised on limiting carbohydrates, sweets, sugars in the diet  I would like patient to start a routine exercise regimen and work on weight loss  Patient is agreeable with this  Will recheck A1c in the springtime         Fatigue       Will check iron studies as well as vitamin B12 as patient did have  Low vitamin B12 in her previous blood work  Patient is agreeable with this  Relevant Orders    Vitamin B12 (Completed)    Ferritin (Completed)    Iron (Completed)        Keep scheduled appointment for the springtime  Will notify patient of results  There are no Patient Instructions on file for this visit  Chief Complaint     Chief Complaint   Patient presents with    Follow-up     Pt is here for a f/u appt  Pt was seen in the [facility] ER two days ago  Pt went to the ER with chest discomfort  History of Present Illness     HPI    Patient presents for follow-up of recent ER visit for chest pain she was experiencing for past 1 week that was worsening  Patient had blood work, troponins, EKG which was overall within normal range  Patient states she continues to experience pain that is intermittent in the mid chest area, sometimes sharp  Denies any trauma, heavy lifting or recent URI symptoms  Patient did  Have cold symptoms and 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  Patient also has had chronic left upper neck pain and left-sided arm pain and states physical therapy has helped her in the past and would like to go back for further therapy  In addition, patient states she does not sleep well due to anxiety as her thoughts race  Denies depressed mood  She usually sleeps at 12 and wakes up at 6 a m  Miguel Ingles She works a shift from 3-11  Review of Systems   Review of Systems   Constitutional: Positive for fatigue  Negative for chills, fever and unexpected weight change  HENT: Negative  Eyes: Negative for visual disturbance  Respiratory: Negative for shortness of breath  Cardiovascular: Positive for palpitations  Negative for leg swelling  Mid anterior chest pain, patient states she can reproduce it when she presses on her chest   Gastrointestinal: Negative for abdominal pain and constipation  Psychiatric/Behavioral: Positive for sleep disturbance  Active Problem List     Patient Active Problem List   Diagnosis    Flu-like symptoms    Hypertriglyceridemia    Elevated blood sugar    Vitamin D deficiency    Atypical chest pain    Palpitations    Neck pain on left side    Generalized anxiety disorder    Insomnia    Impaired fasting glucose    Fatigue       Past Medical History:  No past medical history on file  Past Surgical History:  Past Surgical History:   Procedure Laterality Date     SECTION      CHOLECYSTECTOMY      TOTAL ABDOMINAL HYSTERECTOMY      Due to fibroids  Ovaries present       Family History:  Family History   Problem Relation Age of Onset    Diabetes Mother     Hypertension Mother     Heart attack Mother     Stroke Father        Social History:  Social History     Social History    Marital status: /Civil Union     Spouse name: N/A    Number of children: N/A    Years of education: N/A     Occupational History    Not on file  Social History Main Topics    Smoking status: Never Smoker    Smokeless tobacco: Never Used    Alcohol use No    Drug use: Unknown    Sexual activity: Not on file     Other Topics Concern    Not on file     Social History Narrative    Caffeine use    Description: Tea 1 cup         I have reviewed the patient's medical history in detail; there are no changes to the history as noted in the electronic medical record      Objective     Vitals:    18 0914   BP: 116/62   Pulse: 82   Temp: 97 5 °F (36 4 °C)     Wt Readings from Last 3 Encounters:   18 83 4 kg (183 lb 12 8 oz)   18 81 6 kg (180 lb)   18 80 6 kg (177 lb 12 8 oz)       Physical Exam   Constitutional: She is oriented to person, place, and time  She appears well-developed and well-nourished  HENT:   Head: Normocephalic and atraumatic  Mouth/Throat: Oropharynx is clear and moist     Tms intact and clear  Eyes: Conjunctivae and EOM are normal  Pupils are equal, round, and reactive to light  Neck: Normal range of motion  Neck supple  No thyromegaly present  Cardiovascular: Normal rate and regular rhythm  No murmur heard  Pulmonary/Chest: Effort normal and breath sounds normal    Musculoskeletal:   ttp of left trapezius region    Tender to palpation of mid anterior chest wall region   Lymphadenopathy:     She has no cervical adenopathy  Neurological: She is alert and oriented to person, place, and time  Psychiatric: She has a normal mood and affect  Nursing note and vitals reviewed        Pertinent Laboratory/Diagnostic Studies:  Lab Results   Component Value Date    GLUCOSE 211 (H) 02/21/2018    BUN 11 02/21/2018    CREATININE 0 71 02/21/2018    CALCIUM 8 8 02/21/2018     (L) 02/21/2018    K 3 7 02/21/2018    CO2 26 02/21/2018     02/21/2018     Lab Results   Component Value Date    ALT 26 02/21/2018    AST 11 02/21/2018    ALKPHOS 58 02/21/2018    BILITOT 0 73 02/21/2018       Lab Results   Component Value Date    WBC 6 89 02/21/2018    HGB 14 4 02/21/2018    HCT 39 5 02/21/2018    MCV 82 02/21/2018     02/21/2018       No results found for: TSH    Lab Results   Component Value Date    CHOL 168 07/27/2017     Lab Results   Component Value Date    TRIG 284 (H) 07/27/2017     Lab Results   Component Value Date    HDL 38 (L) 07/27/2017     Lab Results   Component Value Date    LDLCALC 73 07/27/2017     Lab Results   Component Value Date    HGBA1C 6 4 (H) 01/30/2018       Results for orders placed or performed during the hospital encounter of 02/21/18   Comprehensive metabolic panel   Result Value Ref Range    Sodium 135 (L) 136 - 145 mmol/L    Potassium 3 7 3 5 - 5 3 mmol/L Chloride 103 100 - 108 mmol/L    CO2 26 21 - 32 mmol/L    Anion Gap 6 4 - 13 mmol/L    BUN 11 5 - 25 mg/dL    Creatinine 0 71 0 60 - 1 30 mg/dL    Glucose 211 (H) 65 - 140 mg/dL    Calcium 8 8 8 3 - 10 1 mg/dL    AST 11 5 - 45 U/L    ALT 26 12 - 78 U/L    Alkaline Phosphatase 58 46 - 116 U/L    Total Protein 7 8 6 4 - 8 2 g/dL    Albumin 4 0 3 5 - 5 0 g/dL    Total Bilirubin 0 73 0 20 - 1 00 mg/dL    eGFR 104 ml/min/1 73sq m   CBC and differential   Result Value Ref Range    WBC 6 89 4 31 - 10 16 Thousand/uL    RBC 4 83 3 81 - 5 12 Million/uL    Hemoglobin 14 4 11 5 - 15 4 g/dL    Hematocrit 39 5 34 8 - 46 1 %    MCV 82 82 - 98 fL    MCH 29 8 26 8 - 34 3 pg    MCHC 36 5 31 4 - 37 4 g/dL    RDW 12 3 11 6 - 15 1 %    MPV 10 3 8 9 - 12 7 fL    Platelets 485 541 - 421 Thousands/uL    nRBC 0 /100 WBCs    Neutrophils Relative 58 43 - 75 %    Lymphocytes Relative 32 14 - 44 %    Monocytes Relative 7 4 - 12 %    Eosinophils Relative 3 0 - 6 %    Basophils Relative 0 0 - 1 %    Neutrophils Absolute 3 94 1 85 - 7 62 Thousands/µL    Lymphocytes Absolute 2 23 0 60 - 4 47 Thousands/µL    Monocytes Absolute 0 46 0 17 - 1 22 Thousand/µL    Eosinophils Absolute 0 23 0 00 - 0 61 Thousand/µL    Basophils Absolute 0 02 0 00 - 0 10 Thousands/µL   Troponin I   Result Value Ref Range    Troponin I <0 02 <=0 04 ng/mL   ECG 12 lead   Result Value Ref Range    Ventricular Rate 72 BPM    Atrial Rate 72 BPM    HI Interval 142 ms    QRSD Interval 90 ms    QT Interval 406 ms    QTC Interval 444 ms    P Axis 76 degrees    QRS Axis 89 degrees    T Wave Axis 70 degrees       Orders Placed This Encounter   Procedures    Vitamin B12    Ferritin    Iron    Ambulatory referral to Cardiology    Ambulatory referral to Physical Therapy    Echo stress test w contrast if indicated    Holter monitor - 24 hour       ALLERGIES:  Allergies   Allergen Reactions    Pollen Extract Itching     Itchy, runny nose       Current Medications     Current Outpatient Prescriptions   Medication Sig Dispense Refill    acetaminophen (TYLENOL) 500 mg tablet Take 500 mg by mouth every 6 (six) hours      ergocalciferol (VITAMIN D2) 50,000 units TAKE 1 CAPSULE WEEKLY FOR 8 WEEKS THEN MONTHLY FOR 2 MONTHS  0    ergocalciferol (VITAMIN D2) 50,000 units Take by mouth      ergocalciferol (VITAMIN D2) 50,000 units Take 1 capsule (50,000 Units total) by mouth once a week for 4 doses 4 capsule 2    ibuprofen (MOTRIN) 200 mg tablet Take 200 mg by mouth every 6 (six) hours      tiZANidine (ZANAFLEX) 2 mg tablet TAKE 1 TABLET BY MOUTH NIGHTLY FOR 15 DAYS  0    LORazepam (ATIVAN) 1 mg tablet Take 0 5 tablets (0 5 mg total) by mouth daily at bedtime 30 tablet 0     No current facility-administered medications for this visit            Health Maintenance     Health Maintenance   Topic Date Due    HIV SCREENING  1975    Depression Screening PHQ-9  01/03/1986    DTaP,Tdap,and Td Vaccines (3 - Td) 09/09/2016    INFLUENZA VACCINE  09/01/2017     Immunization History   Administered Date(s) Administered    Fluzone Split Quad 0 5 mL 11/09/2015    Td (adult), adsorbed 03/09/2016    Tdap 01/01/2016       Lebron Conte MD

## 2018-02-23 NOTE — ASSESSMENT & PLAN NOTE
Patient presents for follow-up of recent ER visit for chest pain  It appears patient's chest pain is atypical and reproducible  Because patient is prediabetic and has history of hypertriglyceridemia, I would like to have patient be further evaluated by Cardiology, obtain Holter as well as echo stress test   Patient is agreeable with this  Return/ ER parameters discussed

## 2018-02-27 ENCOUNTER — OFFICE VISIT (OUTPATIENT)
Dept: CARDIOLOGY CLINIC | Facility: CLINIC | Age: 43
End: 2018-02-27
Payer: COMMERCIAL

## 2018-02-27 VITALS
OXYGEN SATURATION: 92 % | DIASTOLIC BLOOD PRESSURE: 70 MMHG | HEART RATE: 75 BPM | WEIGHT: 178.8 LBS | SYSTOLIC BLOOD PRESSURE: 100 MMHG | BODY MASS INDEX: 30.69 KG/M2

## 2018-02-27 DIAGNOSIS — R07.9 CHEST PAIN, UNSPECIFIED TYPE: Primary | ICD-10-CM

## 2018-02-27 DIAGNOSIS — M54.2 NECK PAIN ON LEFT SIDE: ICD-10-CM

## 2018-02-27 PROCEDURE — 99244 OFF/OP CNSLTJ NEW/EST MOD 40: CPT | Performed by: INTERNAL MEDICINE

## 2018-02-27 PROCEDURE — 93000 ELECTROCARDIOGRAM COMPLETE: CPT | Performed by: INTERNAL MEDICINE

## 2018-02-27 NOTE — PROGRESS NOTES
Subjective:        Patient ID: Marija Willett is a 39 y o  female  Chief Complaint:  HPI    48-year-old with a past medical history of hypertriglyceridemia, pre diabetes, cervical radiculopathy, asthma, hypothyroidism presents for the evaluation of chest pain  Notes she had chest pain that started about a week ago lasted for about 4-5 days, on and off, not related to exertion, describes it as tightness in the middle of the chest radiating to the left shoulder, left jaw and left arm  Taking Tylenol would ease the pain  She had associated dizziness  No associated shortness of breath, diaphoresis, nausea  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  She denies similar chest pains in the past   At baseline she is able to walk for miles without any exertional symptoms, she denies orthopnea or PND  Notes occasional swelling in her feet and  Palms  EKG obtained in clinic today reveals normal sinus rhythm with no significant ST or T-wave changes, similar to EKG from her recent ED visit on 2018  She had visited the ED for further chest pain, 1 set of troponin was negative at that time she was discharged with outpatient follow-up  Her PCP then referred her to cardiology  She  Has had intermittent left-sided weakness for the last 7-8 years, she was evaluated in [facility] and she was told she had a mini-stroke  Unclear why she has never been  On aspirin or cholesterol pills  She previously followed up in [facility], her echocardiogram in 2016 revealed LV ejection fraction of 60%, no  Regional wall motion abnormality, no significant valvular dysfunction  Her mother had heart failure at the age of 79, unclear etiology, father  of stroke in the 76s  She has never smoked, no illicit drug abuse, no alcohol intake  ROS  As per HPI       Objective:     Physical Exam   Constitutional: She is oriented to person, place, and time   She appears well-developed and well-nourished  No distress  HENT:   Head: Normocephalic  Eyes: Pupils are equal, round, and reactive to light  Neck: Normal range of motion  No JVD present  No tracheal deviation present  No thyromegaly present  Cardiovascular: Normal rate and regular rhythm  No murmur heard  Pulmonary/Chest: Effort normal  No respiratory distress  She has no wheezes  Abdominal: Soft  She exhibits no distension  There is no tenderness  Musculoskeletal: She exhibits no edema  Neurological: She is alert and oriented to person, place, and time  Skin: She is not diaphoretic  Lab Review:   Lab Results   Component Value Date     (L) 02/21/2018     07/27/2017    K 3 7 02/21/2018    K 4 1 07/27/2017    CO2 26 02/21/2018    CO2 24 07/27/2017    BUN 11 02/21/2018    BUN 10 07/27/2017    CREATININE 0 71 02/21/2018    CREATININE 0 67 07/27/2017    GLUCOSE 211 (H) 02/21/2018    CALCIUM 8 8 02/21/2018    CALCIUM 8 9 07/27/2017         Assessment:       1  Chest pain, unspecified type  POCT ECG   2  Neck pain on left side  Ambulatory referral to Cardiology        Plan:       Chest pain     Although atypical considering the continuous nature of pain, non exertional, improves with tylenol  The radiation of pain to her left arm could be related to her pre existing radiculopathy, however in view of her metabolic syndrome- Prediabetic, Obese, review of multiple Lipid panels done at [facility]- TGs in 500s, low HDL, would obtain exercise nuclear stress to evaluate for perfusion defects  Last   Echocardiogram at [facility] in 2016 revealed LV ejection fraction of 60%, no regional wall motion abnormality no valvular dysfunction  Would repeat echocardiogram to look for any new regional wall motion abnormality  Would recheck lipid panel, based on that would initiate lipid-lowering therapy and aspirin

## 2018-03-02 DIAGNOSIS — E53.8 VITAMIN B12 DEFICIENCY: Primary | ICD-10-CM

## 2018-03-02 NOTE — PROGRESS NOTES
Can you please let patient know that her iron studies were normal however her vitamin B12 is in the deficient range  I would like patient to take 1000 mcg of vitamin B12 daily and I would like to recheck her B12   With her next blood work  I will print this out    Thank you

## 2018-03-03 ENCOUNTER — LAB (OUTPATIENT)
Dept: LAB | Facility: CLINIC | Age: 43
End: 2018-03-03
Payer: COMMERCIAL

## 2018-03-03 LAB
CHOLEST SERPL-MCNC: 147 MG/DL (ref 50–200)
HDLC SERPL-MCNC: 37 MG/DL (ref 40–60)
LDLC SERPL CALC-MCNC: 46 MG/DL (ref 0–100)
TRIGL SERPL-MCNC: 321 MG/DL

## 2018-03-03 PROCEDURE — 36415 COLL VENOUS BLD VENIPUNCTURE: CPT | Performed by: INTERNAL MEDICINE

## 2018-03-03 PROCEDURE — 80061 LIPID PANEL: CPT | Performed by: INTERNAL MEDICINE

## 2018-03-09 ENCOUNTER — EVALUATION (OUTPATIENT)
Dept: PHYSICAL THERAPY | Facility: REHABILITATION | Age: 43
End: 2018-03-09
Payer: COMMERCIAL

## 2018-03-09 DIAGNOSIS — M54.2 NECK PAIN ON LEFT SIDE: ICD-10-CM

## 2018-03-09 PROCEDURE — G8992 OTHER PT/OT  D/C STATUS: HCPCS | Performed by: PHYSICAL THERAPIST

## 2018-03-09 PROCEDURE — G8981 BODY POS CURRENT STATUS: HCPCS | Performed by: PHYSICAL THERAPIST

## 2018-03-09 PROCEDURE — 97110 THERAPEUTIC EXERCISES: CPT | Performed by: PHYSICAL THERAPIST

## 2018-03-09 PROCEDURE — G8982 BODY POS GOAL STATUS: HCPCS | Performed by: PHYSICAL THERAPIST

## 2018-03-09 PROCEDURE — 97161 PT EVAL LOW COMPLEX 20 MIN: CPT | Performed by: PHYSICAL THERAPIST

## 2018-03-09 PROCEDURE — 97140 MANUAL THERAPY 1/> REGIONS: CPT | Performed by: PHYSICAL THERAPIST

## 2018-03-09 PROCEDURE — G8991 OTHER PT/OT GOAL STATUS: HCPCS | Performed by: PHYSICAL THERAPIST

## 2018-03-09 NOTE — PROGRESS NOTES
Evaluation    Today's date: 3/9/2018  Patient name: Tabitha Tellez  : 1975  MRN: [id number]  Referring provider: Andrea Hatch MD  Dx:   Encounter Diagnosis     ICD-10-CM    1  Neck pain on left side M54 2 Ambulatory referral to Physical Therapy       Start Time: 1015  Stop Time: 1100  Total time in clinic (min): 45 minutes    Assessment  Impairments: abnormal muscle tone, abnormal or restricted ROM, lacks appropriate home exercise program and pain with function    Assessment details: 38 y/o right-handed female who presents with c/o centralized neck pain and Ha's  The sx's have been occurring for several years  She states the sx's are always present, but fluctuate in intensity  She self treats with heat and OTC medication  Occasionally, her  or daughter will give her a neck massage and that helps decrease her tension  Due to persistent sx's, she was recently seen by her PCP who then referred her to [facility]  Understanding of Dx/Px/POC: good   Prognosis: good    Goals  ST - I with HEP  2 - no HA's for 3 days  LT - FOTO > 59  2 - perform ADL's without limitation due to neck pain      Plan  Patient would benefit from: skilled PT  Planned therapy interventions: joint mobilization, manual therapy, neuromuscular re-education, patient education, therapeutic exercise and home exercise program  Frequency: 2x week  Treatment plan discussed with: patient        Subjective Evaluation    Quality of life: good    Pain  Current pain ratin  At best pain rating: 3  At worst pain ratin  Quality: tight, pressure, sharp, dull ache and discomfort  Relieving factors: rest, relaxation, change in position, medications and heat    Treatments  Previous treatment: medication  Patient Goals  Patient goals for therapy: decreased pain and return to sport/leisure activities          Objective     Palpation   Left   Hypertonic in the upper trapezius     Tenderness of the pectoralis minor and upper trapezius  Right   No palpable tenderness to the pectoralis minor and upper trapezius  Hypertonic in the upper trapezius       Tenderness     Additional Tenderness Details  TTP left rib 1    Cervical/Thoracic Screen   Cervical range of motion within normal limits with the following exceptions: Flexion = 50% with "pulling" in the posterior aspect   Extension = 50% with "tension" in the posterior aspect  Left rotation = 50% with c/o "pain" on left side  Right rotation = no sx's        Neurological Testing     Sensation     Shoulder   Left Shoulder   Intact: pin prick    Right Shoulder   Intact: pin prick      Flowsheet Rows    Flowsheet Row Most Recent Value   PT/OT G-Codes   Current Score  37   Projected Score  59   FOTO information reviewed  Yes   Assessment Type  Evaluation   G code set  Changing & Maintaining Body Position   Changing and Maintaining Body Position Current Status ()  CL   Changing and Maintaining Body Position Goal Status ()  CK          Precautions: hyperlipidemia    Daily Treatment Diary     Manual  03/09            SOR LMR            Supine tissue deformation - b/l UT (left > right) LMR                                                       Exercise Diary  03/09            Supine long coli 10"x10            Seated scap retractions 10"x10                                                                                                                                                                                                                                                          Modalities

## 2018-03-13 ENCOUNTER — APPOINTMENT (OUTPATIENT)
Dept: PHYSICAL THERAPY | Facility: REHABILITATION | Age: 43
End: 2018-03-13
Payer: COMMERCIAL

## 2018-03-19 ENCOUNTER — HOSPITAL ENCOUNTER (OUTPATIENT)
Dept: NON INVASIVE DIAGNOSTICS | Facility: CLINIC | Age: 43
Discharge: HOME | End: 2018-03-19
Payer: COMMERCIAL

## 2018-03-19 DIAGNOSIS — R07.9 CHEST PAIN, UNSPECIFIED TYPE: ICD-10-CM

## 2018-03-19 PROCEDURE — 93018 CV STRESS TEST I&R ONLY: CPT | Performed by: INTERNAL MEDICINE

## 2018-03-19 PROCEDURE — 93306 TTE W/DOPPLER COMPLETE: CPT | Performed by: INTERNAL MEDICINE

## 2018-03-19 PROCEDURE — A9502 TC99M TETROFOSMIN: HCPCS

## 2018-03-19 PROCEDURE — 93017 CV STRESS TEST TRACING ONLY: CPT

## 2018-03-19 PROCEDURE — 93306 TTE W/DOPPLER COMPLETE: CPT

## 2018-03-19 PROCEDURE — 93016 CV STRESS TEST SUPVJ ONLY: CPT | Performed by: INTERNAL MEDICINE

## 2018-03-19 PROCEDURE — 78452 HT MUSCLE IMAGE SPECT MULT: CPT

## 2018-03-19 PROCEDURE — 78452 HT MUSCLE IMAGE SPECT MULT: CPT | Performed by: INTERNAL MEDICINE

## 2018-03-20 LAB
CHEST PAIN STATEMENT: NORMAL
MAX DIASTOLIC BP: 80 MMHG
MAX HEART RATE: 164 BPM
MAX PREDICTED HEART RATE: 176 BPM
MAX. SYSTOLIC BP: 150 MMHG
PROTOCOL NAME: NORMAL
REASON FOR TERMINATION: NORMAL
TARGET HR FORMULA: NORMAL
TEST INDICATION: NORMAL
TIME IN EXERCISE PHASE: NORMAL

## 2018-03-23 ENCOUNTER — APPOINTMENT (OUTPATIENT)
Dept: PHYSICAL THERAPY | Facility: REHABILITATION | Age: 43
End: 2018-03-23
Payer: COMMERCIAL

## 2018-03-23 RX ORDER — ERGOCALCIFEROL 1.25 MG/1
50000 CAPSULE ORAL WEEKLY
Refills: 2 | COMMUNITY
Start: 2018-03-02 | End: 2018-08-14

## 2018-03-27 ENCOUNTER — APPOINTMENT (OUTPATIENT)
Dept: PHYSICAL THERAPY | Facility: REHABILITATION | Age: 43
End: 2018-03-27
Payer: COMMERCIAL

## 2018-03-27 ENCOUNTER — OFFICE VISIT (OUTPATIENT)
Dept: CARDIOLOGY CLINIC | Facility: CLINIC | Age: 43
End: 2018-03-27
Payer: COMMERCIAL

## 2018-03-27 VITALS
WEIGHT: 175.4 LBS | BODY MASS INDEX: 29.94 KG/M2 | HEART RATE: 84 BPM | DIASTOLIC BLOOD PRESSURE: 70 MMHG | SYSTOLIC BLOOD PRESSURE: 106 MMHG | RESPIRATION RATE: 16 BRPM | HEIGHT: 64 IN

## 2018-03-27 DIAGNOSIS — R53.83 FATIGUE, UNSPECIFIED TYPE: Primary | ICD-10-CM

## 2018-03-27 DIAGNOSIS — E78.5 HYPERLIPIDEMIA, UNSPECIFIED HYPERLIPIDEMIA TYPE: ICD-10-CM

## 2018-03-27 PROCEDURE — 99214 OFFICE O/P EST MOD 30 MIN: CPT | Performed by: INTERNAL MEDICINE

## 2018-03-27 RX ORDER — ATORVASTATIN CALCIUM 20 MG/1
20 TABLET, FILM COATED ORAL
Qty: 90 TABLET | Refills: 3 | Status: SHIPPED | OUTPATIENT
Start: 2018-03-27 | End: 2018-07-17

## 2018-03-27 NOTE — PROGRESS NOTES
Cardiology Follow Up    Corey Aguirre  1975  [id number]  [address] [facility] Elena Restrepo  [address] [facility] [address] [facility] [address]    1  Fatigue, unspecified type  TSH, 3rd generation with T4 reflex   2  Hyperlipidemia, unspecified hyperlipidemia type  atorvastatin (LIPITOR) 20 mg tablet       HPI    41-year-old with a past medical history of hypertriglyceridemia, pre diabetes, cervical radiculopathy, asthma, hypothyroidism presents for follow Above chest pain  She 1st saw me on 02/27/2018 for chest pain that started 4-5 days prior to her presentation  An EKG was unremarkable, she had visited the ED for the same with a negative troponin  In view of her ongoing symptom,  we ordered an exercise nuclear stress test and an echocardiogram further evaluation  And she is here for follow-up of results  She states she does not have the similar pain anymore but she has pain on the lateral side of her left breast when she sleeps to the left and  this resolves on changing the position  She also notes excessive fatigue in the last several months and  Decreased cold tolerance  Patient Active Problem List   Diagnosis    Flu-like symptoms    Hypertriglyceridemia    Elevated blood sugar    Vitamin D deficiency    Chest pain    Palpitations    Neck pain on left side    Generalized anxiety disorder    Insomnia    Impaired fasting glucose    Fatigue     No past medical history on file  Social History     Social History    Marital status: /Civil Union     Spouse name: N/A    Number of children: N/A    Years of education: N/A     Occupational History    Not on file       Social History Main Topics    Smoking status: Never Smoker    Smokeless tobacco: Never Used    Alcohol use No    Drug use: Unknown    Sexual activity: Not on file     Other Topics Concern    Not on file     Social History Narrative    Caffeine use Description: Tea 1 cup          Family History   Problem Relation Age of Onset    Diabetes Mother     Hypertension Mother     Heart attack Mother     Stroke Father      Past Surgical History:   Procedure Laterality Date     SECTION      CHOLECYSTECTOMY      TOTAL ABDOMINAL HYSTERECTOMY      Due to fibroids  Ovaries present       Current Outpatient Prescriptions:     acetaminophen (TYLENOL) 500 mg tablet, Take 500 mg by mouth every 6 (six) hours, Disp: , Rfl:     ergocalciferol (VITAMIN D2) 50,000 units, Take 50,000 Units by mouth once a week, Disp: , Rfl: 2    atorvastatin (LIPITOR) 20 mg tablet, Take 1 tablet (20 mg total) by mouth daily at bedtime, Disp: 90 tablet, Rfl: 3  Allergies   Allergen Reactions    Pollen Extract Itching     Itchy, runny nose       Labs:  Lab Results   Component Value Date    CHOL 147 2018    TRIG 321 (H) 2018    HDL 37 (L) 2018     Imaging:    Nuclear Stress test        SUMMARY:  -  Stress results: Duration of exercise was 7 min and 30 sec  Target heart rate was achieved  There was no chest pain during stress  -  ECG conclusions: The stress ECG was negative for ischemia and normal   -  Perfusion imaging: There were no perfusion defects   -  Gated SPECT: The calculated left ventricular ejection fraction was greater than 65 %  Left ventricular ejection fraction was within normal limits by visual estimate  There was no left ventricular regional abnormality      IMPRESSIONS: Normal study after maximal exercise without reproduction of symptoms  Myocardial perfusion imaging was normal at rest and with stress  Left ventricular systolic function was normal     Echo     LEFT VENTRICLE:  Systolic function was normal by visual assessment  Ejection fraction was estimated to be 60 %    There were no regional wall motion abnormalities      MITRAL VALVE:  There was trace regurgitation      TRICUSPID VALVE:  There was mild regurgitation      PULMONIC VALVE:  There was trace regurgitation  Review of Systems:  Review of Systems  As per HPI         Physical Exam:  Physical Exam   Constitutional: She appears well-developed  No distress  Eyes: Pupils are equal, round, and reactive to light  Skin: She is not diaphoretic  Discussion/Summary:      Chest pain    Exercise nuclear stress test was performed recently, she went on the treadmill for 7 5 minutes, achieved 91% of maximum predicted heart rate, did not have any perfusion defects  She also had an echocardiogram done the same time which revealed an LV ejection fraction of 60%, no significant valvular abnormalities  Her EKG performed last visit did not reveal any ST or T-wave changes  She does not have a similar  Chest pain any more,   She  Occasionally  Notices tenderness along the lateral aspect of her left breast that is more on turning to the left, likely musculoskeletal pain  She also has worsening fatigue  And cold intolerance, TSH was normal in 07/2017  Would recheck TSH and free T4  Follow-up with her PCP  Hypertriglyceridemia-  Triglycerides previously rate in the 500s, was checked again recently, TG is 321,  Total cholesterol is 147, HDL 37, LDL 46  Would initiate  Atorvastatin 20 mg Hs  Recheck lipid panel and LFTs in 3 months

## 2018-05-15 DIAGNOSIS — E55.9 VITAMIN D DEFICIENCY: ICD-10-CM

## 2018-05-18 ENCOUNTER — OFFICE VISIT (OUTPATIENT)
Dept: FAMILY MEDICINE CLINIC | Facility: CLINIC | Age: 43
End: 2018-05-18
Payer: COMMERCIAL

## 2018-05-18 VITALS
WEIGHT: 177.8 LBS | RESPIRATION RATE: 16 BRPM | HEIGHT: 64 IN | TEMPERATURE: 96.4 F | BODY MASS INDEX: 30.35 KG/M2 | SYSTOLIC BLOOD PRESSURE: 104 MMHG | HEART RATE: 72 BPM | DIASTOLIC BLOOD PRESSURE: 60 MMHG

## 2018-05-18 DIAGNOSIS — E55.9 VITAMIN D DEFICIENCY: ICD-10-CM

## 2018-05-18 DIAGNOSIS — Z12.31 SCREENING MAMMOGRAM, ENCOUNTER FOR: ICD-10-CM

## 2018-05-18 DIAGNOSIS — Z12.4 PAP SMEAR FOR CERVICAL CANCER SCREENING: ICD-10-CM

## 2018-05-18 DIAGNOSIS — E78.1 HYPERTRIGLYCERIDEMIA: ICD-10-CM

## 2018-05-18 DIAGNOSIS — Z00.00 ROUTINE HEALTH MAINTENANCE: ICD-10-CM

## 2018-05-18 DIAGNOSIS — R73.01 IMPAIRED FASTING GLUCOSE: ICD-10-CM

## 2018-05-18 DIAGNOSIS — H10.13 ALLERGIC CONJUNCTIVITIS OF BOTH EYES: Primary | ICD-10-CM

## 2018-05-18 PROCEDURE — 99396 PREV VISIT EST AGE 40-64: CPT | Performed by: FAMILY MEDICINE

## 2018-05-18 RX ORDER — ERGOCALCIFEROL 1.25 MG/1
50000 CAPSULE ORAL WEEKLY
Qty: 4 CAPSULE | Refills: 2 | OUTPATIENT
Start: 2018-05-18 | End: 2018-06-09

## 2018-05-18 NOTE — PATIENT INSTRUCTIONS

## 2018-05-18 NOTE — PROGRESS NOTES
FAMILY PRACTICE OFFICE VISIT       NAME: Luz Elena Paez  AGE: 39 y o  SEX: female       : 1975        MRN: [id number]    DATE: 2018  TIME: 6:56 PM    Assessment and Plan     Problem List Items Addressed This Visit     Hypertriglyceridemia       Patient was started on atorvastatin  Will recheck lipid panel in August   She is also followed by Cardiology  Continue with lifestyle modifications  Recommend starting routine exercise regimen as well  Relevant Orders    TSH, 3rd generation with T4 reflex    Lipid Panel with Direct LDL reflex    Comprehensive metabolic panel    Vitamin D deficiency      Will recheck vitamin-D level  Relevant Orders    TSH, 3rd generation with T4 reflex    Vitamin D 25 hydroxy    Comprehensive metabolic panel    Impaired fasting glucose       Have strongly encouraged and advised on limiting carbohydrates in the diet  Will check A1c  Relevant Orders    TSH, 3rd generation with T4 reflex    HEMOGLOBIN A1C W/ EAG ESTIMATION    Routine health maintenance - Primary      Rx for mammogram provided  Referral to gyn provided for screening Pap  Patient does have a history of hysterectomy secondary to  Uterine bleeding however does have ovaries in place  RESOLVED: Allergic conjunctivitis of both eyes      Other Visit Diagnoses     Screening mammogram, encounter for        Relevant Orders    Mammo screening bilateral w 3d & cad    Pap smear for cervical cancer screening        Relevant Orders    Ambulatory referral to Gynecology            Patient Instructions     Wellness Visit for Adults   AMBULATORY CARE:   A wellness visit  is when you see your healthcare provider to get screened for health problems  You can also get advice on how to stay healthy  Write down your questions so you remember to ask them  Ask your healthcare provider how often you should have a wellness visit     What happens at a wellness visit:  Your healthcare provider will ask about your health, and your family history of health problems  This includes high blood pressure, heart disease, and cancer  He or she will ask if you have symptoms that concern you, if you smoke, and about your mood  You may also be asked about your intake of medicines, supplements, food, and alcohol  Any of the following may be done:  · Your weight  will be checked  Your height may also be checked so your body mass index (BMI) can be calculated  Your BMI shows if you are at a healthy weight  · Your blood pressure  and heart rate will be checked  Your temperature may also be checked  · Blood and urine tests  may be done  Blood tests may be done to check your cholesterol levels  Abnormal cholesterol levels increase your risk for heart disease and stroke  You may also need a blood or urine test to check for diabetes if you are at increased risk  Urine tests may be done to look for signs of an infection or kidney disease  · A physical exam  includes checking your heartbeat and lungs with a stethoscope  Your healthcare provider may also check your skin to look for sun damage  · Screening tests  may be recommended  A screening test is done to check for diseases that may not cause symptoms  The screening tests you may need depend on your age, gender, family history, and lifestyle habits  For example, colorectal screening may be recommended if you are 48years old or older  Screening tests you need if you are a woman:   · A Pap smear  is used to screen for cervical cancer  Pap smears are usually done every 3 to 5 years depending on your age  You may need them more often if you have had abnormal Pap smear test results in the past  Ask your healthcare provider how often you should have a Pap smear  · A mammogram  is an x-ray of your breasts to screen for breast cancer  Experts recommend mammograms every 2 years starting at age 48 years   You may need a mammogram at age 52 years or younger if you have an increased risk for breast cancer  Talk to your healthcare provider about when you should start having mammograms and how often you need them  Vaccines you may need:   · Get an influenza vaccine  every year  The influenza vaccine protects you from the flu  Several types of viruses cause the flu  The viruses change over time, so new vaccines are made each year  · Get a tetanus-diphtheria (Td) booster vaccine  every 10 years  This vaccine protects you against tetanus and diphtheria  Tetanus is a severe infection that may cause painful muscle spasms and lockjaw  Diphtheria is a severe bacterial infection that causes a thick covering in the back of your mouth and throat  · Get a human papillomavirus (HPV) vaccine  if you are female and aged 23 to 32 or male 23 to 24 and never received it  This vaccine protects you from HPV infection  HPV is the most common infection spread by sexual contact  HPV may also cause vaginal, penile, and anal cancers  · Get a pneumococcal vaccine  if you are aged 72 years or older  The pneumococcal vaccine is an injection given to protect you from pneumococcal disease  Pneumococcal disease is an infection caused by pneumococcal bacteria  The infection may cause pneumonia, meningitis, or an ear infection  · Get a shingles vaccine  if you are aged 61 or older, even if you have had shingles before  The shingles vaccine is an injection to protect you from the varicella-zoster virus  This is the same virus that causes chickenpox  Shingles is a painful rash that develops in people who had chickenpox or have been exposed to the virus  How to eat healthy:  My Plate is a model for planning healthy meals  It shows the types and amounts of foods that should go on your plate  Fruits and vegetables make up about half of your plate, and grains and protein make up the other half  A serving of dairy is included on the side of your plate   The amount of calories and serving sizes you need depends on your age, gender, weight, and height  Examples of healthy foods are listed below:  · Eat a variety of vegetables  such as dark green, red, and orange vegetables  You can also include canned vegetables low in sodium (salt) and frozen vegetables without added butter or sauces  · Eat a variety of fresh fruits , canned fruit in 100% juice, frozen fruit, and dried fruit  · Include whole grains  At least half of the grains you eat should be whole grains  Examples include whole-wheat bread, wheat pasta, brown rice, and whole-grain cereals such as oatmeal     · Eat a variety of protein foods such as seafood (fish and shellfish), lean meat, and poultry without skin (turkey and chicken)  Examples of lean meats include pork leg, shoulder, or tenderloin, and beef round, sirloin, tenderloin, and extra lean ground beef  Other protein foods include eggs and egg substitutes, beans, peas, soy products, nuts, and seeds  · Choose low-fat dairy products such as skim or 1% milk or low-fat yogurt, cheese, and cottage cheese  · Limit unhealthy fats  such as butter, hard margarine, and shortening  Exercise:  Exercise at least 30 minutes per day on most days of the week  Some examples of exercise include walking, biking, dancing, and swimming  You can also fit in more physical activity by taking the stairs instead of the elevator or parking farther away from stores  Include muscle strengthening activities 2 days each week  Regular exercise provides many health benefits  It helps you manage your weight, and decreases your risk for type 2 diabetes, heart disease, stroke, and high blood pressure  Exercise can also help improve your mood  Ask your healthcare provider about the best exercise plan for you  General health and safety guidelines:   · Do not smoke  Nicotine and other chemicals in cigarettes and cigars can cause lung damage   Ask your healthcare provider for information if you currently smoke and need help to quit  E-cigarettes or smokeless tobacco still contain nicotine  Talk to your healthcare provider before you use these products  · Limit alcohol  A drink of alcohol is 12 ounces of beer, 5 ounces of wine, or 1½ ounces of liquor  · Lose weight, if needed  Being overweight increases your risk of certain health conditions  These include heart disease, high blood pressure, type 2 diabetes, and certain types of cancer  · Protect your skin  Do not sunbathe or use tanning beds  Use sunscreen with a SPF 15 or higher  Apply sunscreen at least 15 minutes before you go outside  Reapply sunscreen every 2 hours  Wear protective clothing, hats, and sunglasses when you are outside  · Drive safely  Always wear your seatbelt  Make sure everyone in your car wears a seatbelt  A seatbelt can save your life if you are in an accident  Do not use your cell phone when you are driving  This could distract you and cause an accident  Pull over if you need to make a call or send a text message  · Practice safe sex  Use latex condoms if are sexually active and have more than one partner  Your healthcare provider may recommend screening tests for sexually transmitted infections (STIs)  · Wear helmets, lifejackets, and protective gear  Always wear a helmet when you ride a bike or motorcycle, go skiing, or play sports that could cause a head injury  Wear protective equipment when you play sports  Wear a lifejacket when you are on a boat or doing water sports  © 2017 [employer]  Information is for End User's use only and may not be sold, redistributed or otherwise used for commercial purposes  All illustrations and images included in CareNotes® are the copyrighted property of [employer] , [employer]  or [employer]  The above information is an  only  It is not intended as medical advice for individual conditions or treatments   Talk to your doctor, nurse or pharmacist before following any medical regimen to see if it is safe and effective for you  Chief Complaint     Chief Complaint   Patient presents with    Physical Exam     Pt is here for annual exam and has c/o allergies        History of Present Illness     HPI    41-year-old female here for routine health maintenance exam   Patient states she is doing well overall  She has been trying to watch what she eats, limiting carbohydrates in the diet  She has decreased sugar intake  Patient has been experiencing allergy symptoms including runny nose, itchy watery eyes  Review of Systems   Review of Systems   Constitutional: Negative for unexpected weight change  HENT: Negative  Eyes: Negative for visual disturbance  Respiratory: Negative for shortness of breath  Cardiovascular: Negative  Negative for chest pain, palpitations and leg swelling  Gastrointestinal: Negative for abdominal pain and constipation  Genitourinary: Negative for dysuria  Psychiatric/Behavioral: Negative for dysphoric mood and sleep disturbance  Active Problem List     Patient Active Problem List   Diagnosis    Hypertriglyceridemia    Elevated blood sugar    Vitamin D deficiency    Neck pain on left side    Generalized anxiety disorder    Insomnia    Impaired fasting glucose    Fatigue    Routine health maintenance       Past Medical History:  History reviewed  No pertinent past medical history  Past Surgical History:  Past Surgical History:   Procedure Laterality Date     SECTION      CHOLECYSTECTOMY      TOTAL ABDOMINAL HYSTERECTOMY      Due to fibroids   Ovaries present       Family History:  Family History   Problem Relation Age of Onset    Diabetes Mother     Hypertension Mother     Heart attack Mother     Stroke Father        Social History:  Social History     Social History    Marital status: /Civil Union     Spouse name: N/A    Number of children: N/A    Years of education: N/A Occupational History    Not on file  Social History Main Topics    Smoking status: Never Smoker    Smokeless tobacco: Never Used    Alcohol use No    Drug use: Unknown    Sexual activity: Not on file     Other Topics Concern    Not on file     Social History Narrative    Caffeine use    Description: Tea 1 cup         I have reviewed the patient's medical history in detail; there are no changes to the history as noted in the electronic medical record  Objective     Vitals:    05/18/18 0916   BP: 104/60   Pulse: 72   Resp: 16   Temp: (!) 96 4 °F (35 8 °C)     Wt Readings from Last 3 Encounters:   05/18/18 80 6 kg (177 lb 12 8 oz)   03/27/18 79 6 kg (175 lb 6 4 oz)   02/27/18 81 1 kg (178 lb 12 8 oz)       Physical Exam   Constitutional: She is oriented to person, place, and time  She appears well-developed and well-nourished  HENT:   Head: Normocephalic and atraumatic  Mouth/Throat: Oropharynx is clear and moist      TMs intact and clear   Eyes: Conjunctivae and EOM are normal  Pupils are equal, round, and reactive to light  Neck: Normal range of motion  Neck supple  No thyromegaly present  Cardiovascular: Normal rate, regular rhythm and normal heart sounds  No murmur heard  Pulmonary/Chest: Effort normal and breath sounds normal    Abdominal: Soft  Bowel sounds are normal  She exhibits no distension  There is no tenderness  Musculoskeletal: Normal range of motion  She exhibits no edema  Lymphadenopathy:     She has no cervical adenopathy  Neurological: She is alert and oriented to person, place, and time  Skin: No rash noted  Psychiatric: She has a normal mood and affect  Nursing note and vitals reviewed        Pertinent Laboratory/Diagnostic Studies:  Lab Results   Component Value Date    GLUCOSE 211 (H) 02/21/2018    BUN 11 02/21/2018    CREATININE 0 71 02/21/2018    CALCIUM 8 8 02/21/2018     (L) 02/21/2018    K 3 7 02/21/2018    CO2 26 02/21/2018     02/21/2018 Lab Results   Component Value Date    ALT 26 02/21/2018    AST 11 02/21/2018    ALKPHOS 58 02/21/2018    BILITOT 0 73 02/21/2018       Lab Results   Component Value Date    WBC 6 89 02/21/2018    HGB 14 4 02/21/2018    HCT 39 5 02/21/2018    MCV 82 02/21/2018     02/21/2018       No results found for: TSH    Lab Results   Component Value Date    CHOL 147 03/03/2018     Lab Results   Component Value Date    TRIG 321 (H) 03/03/2018     Lab Results   Component Value Date    HDL 37 (L) 03/03/2018     Lab Results   Component Value Date    LDLCALC 46 03/03/2018     Lab Results   Component Value Date    HGBA1C 6 4 (H) 01/30/2018       Results for orders placed or performed during the hospital encounter of 03/19/18   Stress strip   Result Value Ref Range    Protocol Name CHELY     Time In Exercise Phase 00:07:31     MAX   SYSTOLIC  mmHg    Max Diastolic Bp 80 mmHg    Max Heart Rate 164 BPM    Max Predicted Heart Rate 176 BPM    Reason for Termination Fatigue     Test Indication Screening for CAD     Target Hr Formular (220 - Age)*100%     Arrhy During Ex      ECG Interp Before Ex      ECG Interp during Ex      Ex Summary Comment      Chest Pain Statement none     Overall Hr Response To Exercise      Overall BP Response To Exercise         Orders Placed This Encounter   Procedures    Mammo screening bilateral w 3d & cad    TSH, 3rd generation with T4 reflex    Vitamin D 25 hydroxy    HEMOGLOBIN A1C W/ EAG ESTIMATION    Lipid Panel with Direct LDL reflex    Comprehensive metabolic panel    Ambulatory referral to Gynecology       ALLERGIES:  Allergies   Allergen Reactions    Pollen Extract Itching     Itchy, runny nose       Current Medications     Current Outpatient Prescriptions   Medication Sig Dispense Refill    acetaminophen (TYLENOL) 500 mg tablet Take 500 mg by mouth as needed        atorvastatin (LIPITOR) 20 mg tablet Take 1 tablet (20 mg total) by mouth daily at bedtime 90 tablet 3    Cetirizine HCl (ZYRTEC ALLERGY PO) Take 1 tablet by mouth daily      ergocalciferol (VITAMIN D2) 50,000 units Take 50,000 Units by mouth once a week  2     No current facility-administered medications for this visit            Health Maintenance     Health Maintenance   Topic Date Due    HIV SCREENING  1975    INFLUENZA VACCINE  09/01/2018    Depression Screening PHQ-9  03/09/2019    DTaP,Tdap,and Td Vaccines (2 - Td) 03/09/2026     Immunization History   Administered Date(s) Administered    Fluzone Split Quad 0 5 mL 11/09/2015    Td (adult), adsorbed 03/09/2016    Tdap 01/01/2016       Sofia Linder MD

## 2018-05-20 PROBLEM — R68.89 FLU-LIKE SYMPTOMS: Status: RESOLVED | Noted: 2018-01-30 | Resolved: 2018-05-20

## 2018-05-20 PROBLEM — R07.9 CHEST PAIN: Status: RESOLVED | Noted: 2018-02-23 | Resolved: 2018-05-20

## 2018-05-20 PROBLEM — H10.13 ALLERGIC CONJUNCTIVITIS OF BOTH EYES: Status: RESOLVED | Noted: 2018-05-20 | Resolved: 2018-05-20

## 2018-05-20 PROBLEM — Z00.00 ROUTINE HEALTH MAINTENANCE: Status: ACTIVE | Noted: 2018-05-20

## 2018-05-20 PROBLEM — H10.13 ALLERGIC CONJUNCTIVITIS OF BOTH EYES: Status: ACTIVE | Noted: 2018-05-20

## 2018-05-20 PROBLEM — R00.2 PALPITATIONS: Status: RESOLVED | Noted: 2018-02-23 | Resolved: 2018-05-20

## 2018-05-20 RX ORDER — OLOPATADINE HYDROCHLORIDE 1 MG/ML
1 SOLUTION/ DROPS OPHTHALMIC 2 TIMES DAILY
Qty: 5 ML | Refills: 3 | Status: SHIPPED | OUTPATIENT
Start: 2018-05-20 | End: 2018-08-14

## 2018-05-20 NOTE — ASSESSMENT & PLAN NOTE
Patient was started on atorvastatin  Will recheck lipid panel in August   She is also followed by Cardiology  Continue with lifestyle modifications  Recommend starting routine exercise regimen as well

## 2018-05-20 NOTE — ASSESSMENT & PLAN NOTE
Rx for mammogram provided  Referral to gyn provided for screening Pap  Patient does have a history of hysterectomy secondary to  Uterine bleeding however does have ovaries in place

## 2018-05-23 ENCOUNTER — TELEPHONE (OUTPATIENT)
Dept: FAMILY MEDICINE CLINIC | Facility: CLINIC | Age: 43
End: 2018-05-23

## 2018-05-23 NOTE — TELEPHONE ENCOUNTER
Patient needed prior auth for Olopatadine HCL 0 1% Solution  Auth was denied, patient is to try Zaditor or Alaway OTC  If patient is unable to use these due to an intolerance, espected adverse reaction or contraindication after two weeks, another prior auth needs to be submitted with documentation

## 2018-07-14 ENCOUNTER — LAB (OUTPATIENT)
Dept: LAB | Facility: CLINIC | Age: 43
End: 2018-07-14
Payer: COMMERCIAL

## 2018-07-14 DIAGNOSIS — R73.01 IMPAIRED FASTING GLUCOSE: ICD-10-CM

## 2018-07-14 DIAGNOSIS — E55.9 VITAMIN D DEFICIENCY: ICD-10-CM

## 2018-07-14 DIAGNOSIS — E53.8 VITAMIN B12 DEFICIENCY: ICD-10-CM

## 2018-07-14 DIAGNOSIS — E78.1 HYPERTRIGLYCERIDEMIA: ICD-10-CM

## 2018-07-14 LAB
25(OH)D3 SERPL-MCNC: 15.7 NG/ML (ref 30–100)
ALBUMIN SERPL BCP-MCNC: 3.9 G/DL (ref 3.5–5)
ALP SERPL-CCNC: 62 U/L (ref 46–116)
ALT SERPL W P-5'-P-CCNC: 28 U/L (ref 12–78)
ANION GAP SERPL CALCULATED.3IONS-SCNC: 5 MMOL/L (ref 4–13)
AST SERPL W P-5'-P-CCNC: 13 U/L (ref 5–45)
BILIRUB SERPL-MCNC: 0.72 MG/DL (ref 0.2–1)
BUN SERPL-MCNC: 11 MG/DL (ref 5–25)
CALCIUM SERPL-MCNC: 8.5 MG/DL (ref 8.3–10.1)
CHLORIDE SERPL-SCNC: 104 MMOL/L (ref 100–108)
CHOLEST SERPL-MCNC: 154 MG/DL (ref 50–200)
CO2 SERPL-SCNC: 26 MMOL/L (ref 21–32)
CREAT SERPL-MCNC: 0.69 MG/DL (ref 0.6–1.3)
EST. AVERAGE GLUCOSE BLD GHB EST-MCNC: 123 MG/DL
GFR SERPL CREATININE-BSD FRML MDRD: 106 ML/MIN/1.73SQ M
GLUCOSE P FAST SERPL-MCNC: 103 MG/DL (ref 65–99)
HBA1C MFR BLD: 5.9 % (ref 4.2–6.3)
HDLC SERPL-MCNC: 40 MG/DL (ref 40–60)
LDLC SERPL CALC-MCNC: 42 MG/DL (ref 0–100)
POTASSIUM SERPL-SCNC: 4.1 MMOL/L (ref 3.5–5.3)
PROT SERPL-MCNC: 7.3 G/DL (ref 6.4–8.2)
SODIUM SERPL-SCNC: 135 MMOL/L (ref 136–145)
TRIGL SERPL-MCNC: 360 MG/DL
TSH SERPL DL<=0.05 MIU/L-ACNC: 2.09 UIU/ML (ref 0.36–3.74)
VIT B12 SERPL-MCNC: 124 PG/ML (ref 100–900)

## 2018-07-14 PROCEDURE — 83036 HEMOGLOBIN GLYCOSYLATED A1C: CPT

## 2018-07-14 PROCEDURE — 36415 COLL VENOUS BLD VENIPUNCTURE: CPT

## 2018-07-14 PROCEDURE — 80061 LIPID PANEL: CPT

## 2018-07-14 PROCEDURE — 82607 VITAMIN B-12: CPT

## 2018-07-14 PROCEDURE — 82306 VITAMIN D 25 HYDROXY: CPT

## 2018-07-14 PROCEDURE — 80053 COMPREHEN METABOLIC PANEL: CPT

## 2018-07-14 PROCEDURE — 84443 ASSAY THYROID STIM HORMONE: CPT | Performed by: INTERNAL MEDICINE

## 2018-07-17 ENCOUNTER — OFFICE VISIT (OUTPATIENT)
Dept: FAMILY MEDICINE CLINIC | Facility: CLINIC | Age: 43
End: 2018-07-17
Payer: COMMERCIAL

## 2018-07-17 VITALS
RESPIRATION RATE: 16 BRPM | TEMPERATURE: 97.6 F | BODY MASS INDEX: 30.22 KG/M2 | SYSTOLIC BLOOD PRESSURE: 104 MMHG | WEIGHT: 177 LBS | HEART RATE: 64 BPM | HEIGHT: 64 IN | DIASTOLIC BLOOD PRESSURE: 70 MMHG

## 2018-07-17 DIAGNOSIS — E78.1 HYPERTRIGLYCERIDEMIA: ICD-10-CM

## 2018-07-17 DIAGNOSIS — R73.01 IMPAIRED FASTING GLUCOSE: Primary | ICD-10-CM

## 2018-07-17 DIAGNOSIS — E53.8 VITAMIN B12 DEFICIENCY: ICD-10-CM

## 2018-07-17 DIAGNOSIS — E55.9 VITAMIN D DEFICIENCY: ICD-10-CM

## 2018-07-17 PROCEDURE — 99214 OFFICE O/P EST MOD 30 MIN: CPT | Performed by: FAMILY MEDICINE

## 2018-07-17 RX ORDER — ERGOCALCIFEROL 1.25 MG/1
50000 CAPSULE ORAL WEEKLY
Qty: 12 CAPSULE | Refills: 0 | Status: SHIPPED | OUTPATIENT
Start: 2018-07-17 | End: 2019-03-08

## 2018-07-17 NOTE — PROGRESS NOTES
FAMILY PRACTICE OFFICE VISIT       NAME: Gwendolyn Christopher  AGE: 39 y o  SEX: female       : 1975        MRN: [id number]    DATE: 2018  TIME: 2:23 PM    Assessment and Plan     Problem List Items Addressed This Visit     Hypertriglyceridemia       Total cholesterol is overall stable however patient's triglycerides are elevated  I have advised patient to start fish oil, 1000 mg twice daily  Will recheck lipid panel in 3-4 months  Have encouraged increase heart healthy fats in the diet by incorporating Mediterranean diet  I would like patient to also start a routine exercise regimen and work on weight loss  Patient is agreeable with this plan  Relevant Orders    Lipid Panel with Direct LDL reflex    Vitamin D deficiency       Will call in Rx for vitamin-D supplement patient's pharmacy  Will recheck vitamin-D level  Blood work Rx provided  Relevant Medications    ergocalciferol (VITAMIN D2) 50,000 units    Other Relevant Orders    Vitamin D 25 hydroxy    Impaired fasting glucose - Primary      Recent A1c improved  Noted to be 5 9  Continue with lifestyle modifications  Have encouraged start routine exercise as well  Will continue to monitor  Relevant Orders    Hemoglobin A1C    Vitamin B12 deficiency       Vitamin B12 continues to remain low  I I have asked patient to take her vitamin B12 supplements regularly  She states she is forgetting  I will recheck vitamin B12 level in 3-4 months  Blood work Rx provided  Relevant Orders    Vitamin B12            There are no Patient Instructions on file for this visit  Chief Complaint     Chief Complaint   Patient presents with    Follow-up     blood work results       History of Present Illness     HPI    71-year-old female here for follow-up of chronic conditions and review recent blood work results    Patient states she has been doing well overall, watching what she eats, limiting carbohydrates in the diet   She does however feel fatigued at times  Patient states she did start taking Lipitor in May, only took it for 1 month as she was forgetting to take it at bedtime  Review of Systems   Review of Systems   Constitutional: Positive for fatigue  Negative for unexpected weight change  Eyes: Negative for visual disturbance  Respiratory: Negative for shortness of breath  Cardiovascular: Negative  Gastrointestinal: Negative for abdominal pain  Psychiatric/Behavioral: Negative for dysphoric mood  Active Problem List     Patient Active Problem List   Diagnosis    Hypertriglyceridemia    Elevated blood sugar    Vitamin D deficiency    Neck pain on left side    Generalized anxiety disorder    Insomnia    Impaired fasting glucose    Fatigue    Routine health maintenance    Vitamin B12 deficiency       Past Medical History:  No past medical history on file  Past Surgical History:  Past Surgical History:   Procedure Laterality Date     SECTION      CHOLECYSTECTOMY      TOTAL ABDOMINAL HYSTERECTOMY      Due to fibroids  Ovaries present       Family History:  Family History   Problem Relation Age of Onset    Diabetes Mother     Hypertension Mother     Heart attack Mother     Stroke Father        Social History:  Social History     Social History    Marital status: /Civil Union     Spouse name: N/A    Number of children: N/A    Years of education: N/A     Occupational History    Not on file  Social History Main Topics    Smoking status: Never Smoker    Smokeless tobacco: Never Used    Alcohol use No    Drug use: Unknown    Sexual activity: Not on file     Other Topics Concern    Not on file     Social History Narrative    Caffeine use    Description: Tea 1 cup         I have reviewed the patient's medical history in detail; there are no changes to the history as noted in the electronic medical record      Objective     Vitals:    18 1637   BP: 104/70 Pulse: 64   Resp: 16   Temp: 97 6 °F (36 4 °C)     Wt Readings from Last 3 Encounters:   07/17/18 80 3 kg (177 lb)   05/18/18 80 6 kg (177 lb 12 8 oz)   03/27/18 79 6 kg (175 lb 6 4 oz)       Physical Exam   Constitutional: She is oriented to person, place, and time  She appears well-developed and well-nourished  HENT:   Head: Normocephalic and atraumatic  Mouth/Throat: Oropharynx is clear and moist    Eyes: Conjunctivae and EOM are normal  Pupils are equal, round, and reactive to light  Neck: Normal range of motion  Neck supple  Cardiovascular: Normal rate, regular rhythm and normal heart sounds  Pulmonary/Chest: Effort normal and breath sounds normal    Abdominal: Soft  Bowel sounds are normal  She exhibits no distension  There is no tenderness  Musculoskeletal: Normal range of motion  She exhibits no edema  Lymphadenopathy:     She has no cervical adenopathy  Neurological: She is alert and oriented to person, place, and time  Psychiatric: She has a normal mood and affect  Nursing note and vitals reviewed        Pertinent Laboratory/Diagnostic Studies:  Lab Results   Component Value Date    GLUCOSE 211 (H) 02/21/2018    BUN 11 07/14/2018    CREATININE 0 69 07/14/2018    CALCIUM 8 5 07/14/2018     (L) 07/14/2018    K 4 1 07/14/2018    CO2 26 07/14/2018     07/14/2018     Lab Results   Component Value Date    ALT 28 07/14/2018    AST 13 07/14/2018    ALKPHOS 62 07/14/2018    BILITOT 0 72 07/14/2018       Lab Results   Component Value Date    WBC 6 89 02/21/2018    HGB 14 4 02/21/2018    HCT 39 5 02/21/2018    MCV 82 02/21/2018     02/21/2018       No results found for: TSH    Lab Results   Component Value Date    CHOL 154 07/14/2018     Lab Results   Component Value Date    TRIG 360 (H) 07/14/2018     Lab Results   Component Value Date    HDL 40 07/14/2018     Lab Results   Component Value Date    LDLCALC 42 07/14/2018     Lab Results   Component Value Date    HGBA1C 5 9 07/14/2018       Results for orders placed or performed in visit on 07/14/18   Vitamin B12   Result Value Ref Range    Vitamin B-12 124 100 - 900 pg/mL   Vitamin D 25 hydroxy   Result Value Ref Range    Vit D, 25-Hydroxy 15 7 (L) 30 0 - 100 0 ng/mL   HEMOGLOBIN A1C W/ EAG ESTIMATION   Result Value Ref Range    Hemoglobin A1C 5 9 4 2 - 6 3 %     mg/dl   Lipid Panel with Direct LDL reflex   Result Value Ref Range    Cholesterol 154 50 - 200 mg/dL    Triglycerides 360 (H) <=150 mg/dL    HDL, Direct 40 40 - 60 mg/dL    LDL Calculated 42 0 - 100 mg/dL   Comprehensive metabolic panel   Result Value Ref Range    Sodium 135 (L) 136 - 145 mmol/L    Potassium 4 1 3 5 - 5 3 mmol/L    Chloride 104 100 - 108 mmol/L    CO2 26 21 - 32 mmol/L    Anion Gap 5 4 - 13 mmol/L    BUN 11 5 - 25 mg/dL    Creatinine 0 69 0 60 - 1 30 mg/dL    Glucose, Fasting 103 (H) 65 - 99 mg/dL    Calcium 8 5 8 3 - 10 1 mg/dL    AST 13 5 - 45 U/L    ALT 28 12 - 78 U/L    Alkaline Phosphatase 62 46 - 116 U/L    Total Protein 7 3 6 4 - 8 2 g/dL    Albumin 3 9 3 5 - 5 0 g/dL    Total Bilirubin 0 72 0 20 - 1 00 mg/dL    eGFR 106 ml/min/1 73sq m       Orders Placed This Encounter   Procedures    Hemoglobin A1C    Vitamin D 25 hydroxy    Vitamin B12    Lipid Panel with Direct LDL reflex       ALLERGIES:  Allergies   Allergen Reactions    Dust Mite Extract Itching     Itchy, runny nose    Pollen Extract Itching     Itchy, runny nose       Current Medications     Current Outpatient Prescriptions   Medication Sig Dispense Refill    acetaminophen (TYLENOL) 500 mg tablet Take 500 mg by mouth as needed        ergocalciferol (VITAMIN D2) 50,000 units Take 50,000 Units by mouth once a week  2    Cetirizine HCl (ZYRTEC ALLERGY PO) Take 1 tablet by mouth daily      ergocalciferol (VITAMIN D2) 50,000 units Take 1 capsule (50,000 Units total) by mouth once a week 12 capsule 0    olopatadine (PATANOL) 0 1 % ophthalmic solution Administer 1 drop to both eyes 2 (two) times a day 5 mL 3     No current facility-administered medications for this visit            Health Maintenance     Health Maintenance   Topic Date Due    HIV SCREENING  1975    INFLUENZA VACCINE  09/01/2018    Depression Screening PHQ-9  03/09/2019    DTaP,Tdap,and Td Vaccines (2 - Td) 03/09/2026     Immunization History   Administered Date(s) Administered    Fluzone Split Quad 0 5 mL 11/09/2015    Td (adult), adsorbed 03/09/2016    Tdap 01/01/2016       Francisca Pascual MD

## 2018-07-18 PROBLEM — E53.8 VITAMIN B12 DEFICIENCY: Status: ACTIVE | Noted: 2018-07-18

## 2018-07-18 NOTE — ASSESSMENT & PLAN NOTE
Vitamin B12 continues to remain low  I I have asked patient to take her vitamin B12 supplements regularly  She states she is forgetting  I will recheck vitamin B12 level in 3-4 months  Blood work Rx provided

## 2018-07-18 NOTE — ASSESSMENT & PLAN NOTE
Will call in Rx for vitamin-D supplement patient's pharmacy  Will recheck vitamin-D level  Blood work Rx provided

## 2018-07-18 NOTE — ASSESSMENT & PLAN NOTE
Recent A1c improved  Noted to be 5 9  Continue with lifestyle modifications  Have encouraged start routine exercise as well  Will continue to monitor

## 2018-07-18 NOTE — ASSESSMENT & PLAN NOTE
Total cholesterol is overall stable however patient's triglycerides are elevated  I have advised patient to start fish oil, 1000 mg twice daily  Will recheck lipid panel in 3-4 months  Have encouraged increase heart healthy fats in the diet by incorporating Mediterranean diet  I would like patient to also start a routine exercise regimen and work on weight loss  Patient is agreeable with this plan

## 2018-08-14 ENCOUNTER — OFFICE VISIT (OUTPATIENT)
Dept: FAMILY MEDICINE CLINIC | Facility: CLINIC | Age: 43
End: 2018-08-14
Payer: COMMERCIAL

## 2018-08-14 VITALS
RESPIRATION RATE: 16 BRPM | WEIGHT: 176 LBS | HEART RATE: 72 BPM | DIASTOLIC BLOOD PRESSURE: 72 MMHG | TEMPERATURE: 98.9 F | HEIGHT: 64 IN | BODY MASS INDEX: 30.05 KG/M2 | SYSTOLIC BLOOD PRESSURE: 98 MMHG

## 2018-08-14 DIAGNOSIS — R51.9 NONINTRACTABLE HEADACHE, UNSPECIFIED CHRONICITY PATTERN, UNSPECIFIED HEADACHE TYPE: Primary | ICD-10-CM

## 2018-08-14 DIAGNOSIS — M25.512 LEFT SHOULDER PAIN, UNSPECIFIED CHRONICITY: ICD-10-CM

## 2018-08-14 DIAGNOSIS — Z12.31 SCREENING MAMMOGRAM, ENCOUNTER FOR: ICD-10-CM

## 2018-08-14 DIAGNOSIS — M54.2 NECK PAIN: ICD-10-CM

## 2018-08-14 PROCEDURE — 1036F TOBACCO NON-USER: CPT | Performed by: FAMILY MEDICINE

## 2018-08-14 PROCEDURE — 3008F BODY MASS INDEX DOCD: CPT | Performed by: FAMILY MEDICINE

## 2018-08-14 PROCEDURE — 99214 OFFICE O/P EST MOD 30 MIN: CPT | Performed by: FAMILY MEDICINE

## 2018-08-14 RX ORDER — LANOLIN ALCOHOL/MO/W.PET/CERES
2000 CREAM (GRAM) TOPICAL DAILY
COMMUNITY

## 2018-08-14 NOTE — PROGRESS NOTES
FAMILY PRACTICE OFFICE VISIT       NAME: Sha Vickers  AGE: 39 y o  SEX: female       : 1975        MRN: [id number]    DATE: 8/15/2018  TIME: 7:56 PM    Assessment and Plan     Problem List Items Addressed This Visit     Neck pain       Likely secondary to muscle spasm however will obtain cervical spine x-ray  Denies tingling, numbness, weakness in extremities  No red flag signs noted  I feel patient would benefit from evaluation by Physical therapy as well as chiropractic care  Relevant Orders    XR spine cervical complete 4 or 5 vw non injury    Nonintractable headache - Primary       Patient states she has had 2 headaches in the past week, different from her usual headaches  Patient also states she has been woken up in the middle of the night with a headache  Neurological exam is overall normal   I will start off by obtaining blood work, I would like to also order MRI brain and refer to Neurology / headache specialist for further evaluation  Patient is agreeable with this plan  She will benefit from also starting a headache diary  no red flag signs noted  Return/ ER parameters discussed  Relevant Orders    Magnesium    Sedimentation rate, automated    C-reactive protein    MRI brain w wo contrast    Ambulatory referral to Neurology    Left shoulder pain       I will start off by obtaining shoulder x-ray  I feel patient would benefit from physical therapy  May consider referral to Orthopedics  If symptoms persist          Relevant Orders    XR shoulder 2+ vw left    XR scapula left    XR clavicle left      Other Visit Diagnoses     Screening mammogram, encounter for        Relevant Orders    Mammo screening bilateral w 3d & cad            There are no Patient Instructions on file for this visit      I have spent 35 minutes with Patient  today in which greater than 50% of this time was spent in counseling/coordination of care regarding Diagnostic results, Prognosis, Risks and benefits of tx options, Intructions for management, Patient and family education, Importance of tx compliance, Risk factor reductions and Impressions  Chief Complaint     Chief Complaint   Patient presents with    Migraine     Patient is here c/o bouts with headaches which is not improving  Patient would like a script for a mammogram     Shoulder Pain     Patient also c/o an ongoing issue with left shoulder and neck pain  History of Present Illness     HPI  Patient presents with a migraine headache  Patient states she had a migraine headache approximately 1 week ago, not typical of her usual headaches  Patient states, the headache was located on her left side, had associated nausea as well as left-sided facial weakness  Patient states the headache started at the base of her head  Also had associated photophobia and phonophobia  The headache is described as throbbing  Patient states the headache improved after 2 days after taking Tylenol, ibuprofen  Patient states, her headache has started again this morning  She normally gets a headache once a month however this is unusual for her to have 2 headaches within a week  Had vision test today, 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 and felt she has never improved with her pain  Patient as, her pain is also over her collarbone as well as her shoulder blade aggravated with certain movements  Review of Systems   Review of Systems   Constitutional: Negative for unexpected weight change  Eyes: Positive for photophobia  Respiratory: Negative for shortness of breath  Cardiovascular: Negative  Gastrointestinal: Positive for nausea  Negative for abdominal pain     Genitourinary: Negative for dysuria  Musculoskeletal: Positive for neck pain  Left shoulder pain, left shoulder blade pain, collar bone pain  Neurological: Positive for headaches  Negative for speech difficulty and weakness  Active Problem List     Patient Active Problem List   Diagnosis    Hypertriglyceridemia    Elevated blood sugar    Vitamin D deficiency    Neck pain    Generalized anxiety disorder    Insomnia    Impaired fasting glucose    Fatigue    Routine health maintenance    Vitamin B12 deficiency    Nonintractable headache    Left shoulder pain       Past Medical History:  No past medical history on file  Past Surgical History:  Past Surgical History:   Procedure Laterality Date     SECTION      CHOLECYSTECTOMY      TOTAL ABDOMINAL HYSTERECTOMY      Due to fibroids  Ovaries present       Family History:  Family History   Problem Relation Age of Onset    Diabetes Mother     Hypertension Mother     Heart attack Mother     Stroke Father        Social History:  Social History     Social History    Marital status: /Civil Union     Spouse name: N/A    Number of children: N/A    Years of education: N/A     Occupational History    Not on file  Social History Main Topics    Smoking status: Never Smoker    Smokeless tobacco: Never Used    Alcohol use No    Drug use: Unknown    Sexual activity: Not on file     Other Topics Concern    Not on file     Social History Narrative    Caffeine use    Description: Tea 1 cup         I have reviewed the patient's medical history in detail; there are no changes to the history as noted in the electronic medical record  Objective     Vitals:    18 1609   BP: 98/72   Pulse: 72   Resp: 16   Temp: 98 9 °F (37 2 °C)     Wt Readings from Last 3 Encounters:   18 79 8 kg (176 lb)   18 80 3 kg (177 lb)   18 80 6 kg (177 lb 12 8 oz)       Physical Exam   Constitutional: She is oriented to person, place, and time   She appears well-developed and well-nourished  HENT:   Head: Normocephalic and atraumatic  Mouth/Throat: Oropharynx is clear and moist    Eyes: Conjunctivae and EOM are normal  Pupils are equal, round, and reactive to light  Neck: Normal range of motion  Neck supple  No thyromegaly present  Cardiovascular: Normal rate, regular rhythm and normal heart sounds  Pulmonary/Chest: Effort normal and breath sounds normal    Musculoskeletal: Normal range of motion  Full range of motion of left shoulder  There is muscle spasm over trapezius area especially over the left trapezius area  Lymphadenopathy:     She has no cervical adenopathy  Neurological: She is alert and oriented to person, place, and time  No cranial nerve deficit  Psychiatric: She has a normal mood and affect  Nursing note and vitals reviewed        Pertinent Laboratory/Diagnostic Studies:  Lab Results   Component Value Date    GLUCOSE 211 (H) 02/21/2018    BUN 11 07/14/2018    CREATININE 0 69 07/14/2018    CALCIUM 8 5 07/14/2018     (L) 07/14/2018    K 4 1 07/14/2018    CO2 26 07/14/2018     07/14/2018     Lab Results   Component Value Date    ALT 28 07/14/2018    AST 13 07/14/2018    ALKPHOS 62 07/14/2018    BILITOT 0 72 07/14/2018       Lab Results   Component Value Date    WBC 6 89 02/21/2018    HGB 14 4 02/21/2018    HCT 39 5 02/21/2018    MCV 82 02/21/2018     02/21/2018       No results found for: TSH    Lab Results   Component Value Date    CHOL 154 07/14/2018     Lab Results   Component Value Date    TRIG 360 (H) 07/14/2018     Lab Results   Component Value Date    HDL 40 07/14/2018     Lab Results   Component Value Date    LDLCALC 42 07/14/2018     Lab Results   Component Value Date    HGBA1C 5 9 07/14/2018       Results for orders placed or performed in visit on 07/14/18   Vitamin B12   Result Value Ref Range    Vitamin B-12 124 100 - 900 pg/mL   Vitamin D 25 hydroxy   Result Value Ref Range    Vit D, 25-Hydroxy 15 7 (L) 30 0 - 100 0 ng/mL   HEMOGLOBIN A1C W/ EAG ESTIMATION   Result Value Ref Range    Hemoglobin A1C 5 9 4 2 - 6 3 %     mg/dl   Lipid Panel with Direct LDL reflex   Result Value Ref Range    Cholesterol 154 50 - 200 mg/dL    Triglycerides 360 (H) <=150 mg/dL    HDL, Direct 40 40 - 60 mg/dL    LDL Calculated 42 0 - 100 mg/dL   Comprehensive metabolic panel   Result Value Ref Range    Sodium 135 (L) 136 - 145 mmol/L    Potassium 4 1 3 5 - 5 3 mmol/L    Chloride 104 100 - 108 mmol/L    CO2 26 21 - 32 mmol/L    Anion Gap 5 4 - 13 mmol/L    BUN 11 5 - 25 mg/dL    Creatinine 0 69 0 60 - 1 30 mg/dL    Glucose, Fasting 103 (H) 65 - 99 mg/dL    Calcium 8 5 8 3 - 10 1 mg/dL    AST 13 5 - 45 U/L    ALT 28 12 - 78 U/L    Alkaline Phosphatase 62 46 - 116 U/L    Total Protein 7 3 6 4 - 8 2 g/dL    Albumin 3 9 3 5 - 5 0 g/dL    Total Bilirubin 0 72 0 20 - 1 00 mg/dL    eGFR 106 ml/min/1 73sq m       Orders Placed This Encounter   Procedures    Mammo screening bilateral w 3d & cad    XR shoulder 2+ vw left    XR spine cervical complete 4 or 5 vw non injury    XR scapula left    XR clavicle left    MRI brain w wo contrast    Magnesium    Sedimentation rate, automated    C-reactive protein    Ambulatory referral to Neurology       ALLERGIES:  Allergies   Allergen Reactions    Dust Mite Extract Itching     Itchy, runny nose    Pollen Extract Itching     Itchy, runny nose       Current Medications     Current Outpatient Prescriptions   Medication Sig Dispense Refill    acetaminophen (TYLENOL) 500 mg tablet Take 500 mg by mouth as needed        atorvastatin (LIPITOR) 20 mg tablet TAKE 1 TABLET (20 MG TOTAL) BY MOUTH DAILY AT BEDTIME  3    cyanocobalamin (VITAMIN B-12) 1,000 mcg tablet Take 2,000 mcg by mouth daily      ergocalciferol (VITAMIN D2) 50,000 units Take 1 capsule (50,000 Units total) by mouth once a week 12 capsule 0    Cetirizine HCl (ZYRTEC ALLERGY PO) Take 1 tablet by mouth daily       No current facility-administered medications for this visit            Health Maintenance     Health Maintenance   Topic Date Due    HIV SCREENING  1975    MAMMOGRAM  1975    PAP SMEAR  01/03/1995    INFLUENZA VACCINE  09/01/2018    Depression Screening PHQ-9  03/09/2019    DTaP,Tdap,and Td Vaccines (2 - Td) 03/09/2026     Immunization History   Administered Date(s) Administered    Fluzone Split Quad 0 5 mL 11/09/2015    Td (adult), adsorbed 03/09/2016    Tdap 01/01/2016       Brad West MD

## 2018-08-15 PROBLEM — R51.9 NONINTRACTABLE HEADACHE: Status: ACTIVE | Noted: 2018-08-15

## 2018-08-15 PROBLEM — M25.512 LEFT SHOULDER PAIN: Status: ACTIVE | Noted: 2018-08-15

## 2018-08-15 NOTE — ASSESSMENT & PLAN NOTE
Patient states she has had 2 headaches in the past week, different from her usual headaches  Patient also states she has been woken up in the middle of the night with a headache  Neurological exam is overall normal   I will start off by obtaining blood work, I would like to also order MRI brain and refer to Neurology / headache specialist for further evaluation  Patient is agreeable with this plan  She will benefit from also starting a headache diary  no red flag signs noted  Return/ ER parameters discussed

## 2018-08-15 NOTE — ASSESSMENT & PLAN NOTE
Likely secondary to muscle spasm however will obtain cervical spine x-ray  Denies tingling, numbness, weakness in extremities  No red flag signs noted  I feel patient would benefit from evaluation by Physical therapy as well as chiropractic care

## 2018-08-15 NOTE — ASSESSMENT & PLAN NOTE
I will start off by obtaining shoulder x-ray  I feel patient would benefit from physical therapy    May consider referral to Orthopedics  If symptoms persist

## 2018-09-05 ENCOUNTER — TELEPHONE (OUTPATIENT)
Dept: FAMILY MEDICINE CLINIC | Facility: CLINIC | Age: 43
End: 2018-09-05

## 2018-09-05 NOTE — TELEPHONE ENCOUNTER
Pt's insurance denied coverage for Olopatadine  Pt must have documentation of trial and failure of formulary alternatives:  1  Zaditor  2  Alaway  Left a detailed message for pt as per above  I asked pt to call back if she has tried the formulary alternatives and we can resubmit PA

## 2018-09-07 ENCOUNTER — TRANSCRIBE ORDERS (OUTPATIENT)
Dept: LAB | Facility: HOSPITAL | Age: 43
End: 2018-09-07

## 2018-09-07 ENCOUNTER — HOSPITAL ENCOUNTER (OUTPATIENT)
Dept: RADIOLOGY | Facility: HOSPITAL | Age: 43
Discharge: HOME | End: 2018-09-07
Payer: COMMERCIAL

## 2018-09-07 ENCOUNTER — TRANSCRIBE ORDERS (OUTPATIENT)
Dept: RADIOLOGY | Facility: HOSPITAL | Age: 43
End: 2018-09-07

## 2018-09-07 ENCOUNTER — APPOINTMENT (OUTPATIENT)
Dept: LAB | Facility: HOSPITAL | Age: 43
End: 2018-09-07
Payer: COMMERCIAL

## 2018-09-07 DIAGNOSIS — R51.9 NONINTRACTABLE HEADACHE, UNSPECIFIED CHRONICITY PATTERN, UNSPECIFIED HEADACHE TYPE: Primary | ICD-10-CM

## 2018-09-07 DIAGNOSIS — R51.9 NONINTRACTABLE HEADACHE, UNSPECIFIED CHRONICITY PATTERN, UNSPECIFIED HEADACHE TYPE: ICD-10-CM

## 2018-09-07 DIAGNOSIS — M54.2 NECK PAIN: ICD-10-CM

## 2018-09-07 DIAGNOSIS — M25.512 LEFT SHOULDER PAIN, UNSPECIFIED CHRONICITY: ICD-10-CM

## 2018-09-07 LAB
CRP SERPL QL: <3 MG/L
ERYTHROCYTE [SEDIMENTATION RATE] IN BLOOD: 18 MM/HOUR (ref 0–20)
MAGNESIUM SERPL-MCNC: 2 MG/DL (ref 1.6–2.6)

## 2018-09-07 PROCEDURE — 73000 X-RAY EXAM OF COLLAR BONE: CPT

## 2018-09-07 PROCEDURE — 73030 X-RAY EXAM OF SHOULDER: CPT

## 2018-09-07 PROCEDURE — 86140 C-REACTIVE PROTEIN: CPT

## 2018-09-07 PROCEDURE — 83735 ASSAY OF MAGNESIUM: CPT

## 2018-09-07 PROCEDURE — 85652 RBC SED RATE AUTOMATED: CPT

## 2018-09-07 PROCEDURE — 72050 X-RAY EXAM NECK SPINE 4/5VWS: CPT

## 2018-09-07 PROCEDURE — 36415 COLL VENOUS BLD VENIPUNCTURE: CPT

## 2018-09-07 PROCEDURE — 73010 X-RAY EXAM OF SHOULDER BLADE: CPT

## 2018-09-10 NOTE — PROGRESS NOTES
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

## 2018-09-11 ENCOUNTER — TELEPHONE (OUTPATIENT)
Dept: FAMILY MEDICINE CLINIC | Facility: CLINIC | Age: 43
End: 2018-09-11

## 2018-09-11 NOTE — TELEPHONE ENCOUNTER
----- Message from Raymond Castaneda MD sent at 9/10/2018  7:02 PM EDT -----  Can you please let patient know that her x-rays including her collarbone, shoulder blade, left shoulder were normal   Her inflammatory markers as well as magnesium blood work were normal as well  Neck x-ray showed mild arthritis at the level of C4-C5 and C5-C6  There is some narrowing on the right side as well  She may benefit from evaluation by pain management  Can you please provide patient with number for [facility] if she is interested    Thank you

## 2018-09-13 ENCOUNTER — TELEPHONE (OUTPATIENT)
Dept: PAIN MEDICINE | Facility: MEDICAL CENTER | Age: 43
End: 2018-09-13

## 2018-09-13 NOTE — TELEPHONE ENCOUNTER
Pt called stating she is being referred to [facility] by her PCP for neck and shoulder pain  Confirmed we are par with insurance and no referral required  Pt states she has not seen prior pain specialist and this is not med management  Recent imaging results in EPIC  CON scheduled with Dr Noah Bailey on 10/1 @ 9:15 AM  NP paperwork mailed to patient

## 2018-09-16 DIAGNOSIS — H10.13 ALLERGIC CONJUNCTIVITIS OF BOTH EYES: Primary | ICD-10-CM

## 2018-09-16 RX ORDER — KETOTIFEN FUMARATE 0.35 MG/ML
1 SOLUTION/ DROPS OPHTHALMIC 2 TIMES DAILY
Qty: 5 ML | Refills: 0 | Status: SHIPPED | OUTPATIENT
Start: 2018-09-16 | End: 2018-12-11 | Stop reason: ALTCHOICE

## 2018-09-17 NOTE — TELEPHONE ENCOUNTER
I have called in the drops    If the patient does not wish to try them are does not have benefit from these drops, she may also schedule appointment with allergy specialist   Thank you

## 2018-10-08 ENCOUNTER — TELEPHONE (OUTPATIENT)
Dept: FAMILY MEDICINE CLINIC | Facility: CLINIC | Age: 43
End: 2018-10-08

## 2018-12-04 ENCOUNTER — TELEPHONE (OUTPATIENT)
Dept: FAMILY MEDICINE CLINIC | Facility: CLINIC | Age: 43
End: 2018-12-04

## 2018-12-04 NOTE — TELEPHONE ENCOUNTER
Patient states her job is requesting her to get a chest xray done for her employment since she can't to the PPD  Is it ok  Please call to advise  Try cell phone 1st & leave a message [phone]

## 2018-12-06 ENCOUNTER — TELEPHONE (OUTPATIENT)
Dept: FAMILY MEDICINE CLINIC | Facility: CLINIC | Age: 43
End: 2018-12-06

## 2018-12-06 DIAGNOSIS — Z11.1 TUBERCULOSIS SCREENING: Primary | ICD-10-CM

## 2018-12-06 NOTE — TELEPHONE ENCOUNTER
Patient called stating that she is starting a new job and cannot have the PPD so they would like her to have a chest xray  Can  You please order this for her?

## 2018-12-10 ENCOUNTER — HOSPITAL ENCOUNTER (OUTPATIENT)
Dept: RADIOLOGY | Facility: HOSPITAL | Age: 43
Discharge: HOME | End: 2018-12-10
Payer: COMMERCIAL

## 2018-12-10 ENCOUNTER — TRANSCRIBE ORDERS (OUTPATIENT)
Dept: RADIOLOGY | Facility: HOSPITAL | Age: 43
End: 2018-12-10

## 2018-12-10 DIAGNOSIS — Z11.1 TUBERCULOSIS SCREENING: ICD-10-CM

## 2018-12-10 DIAGNOSIS — E53.8 VITAMIN B12 DEFICIENCY: Primary | ICD-10-CM

## 2018-12-10 PROCEDURE — 71046 X-RAY EXAM CHEST 2 VIEWS: CPT

## 2018-12-11 ENCOUNTER — OFFICE VISIT (OUTPATIENT)
Dept: FAMILY MEDICINE CLINIC | Facility: CLINIC | Age: 43
End: 2018-12-11
Payer: COMMERCIAL

## 2018-12-11 VITALS
HEIGHT: 64 IN | BODY MASS INDEX: 30.7 KG/M2 | SYSTOLIC BLOOD PRESSURE: 100 MMHG | DIASTOLIC BLOOD PRESSURE: 70 MMHG | HEART RATE: 78 BPM | TEMPERATURE: 96.7 F | WEIGHT: 179.8 LBS | RESPIRATION RATE: 16 BRPM

## 2018-12-11 DIAGNOSIS — J02.9 SORE THROAT: ICD-10-CM

## 2018-12-11 DIAGNOSIS — J02.9 PHARYNGITIS, UNSPECIFIED ETIOLOGY: Primary | ICD-10-CM

## 2018-12-11 LAB
S PYO AG THROAT QL: NEGATIVE
SL AMB POCT RAPID FLU A: NEGATIVE
SL AMB POCT RAPID FLU B: NEGATIVE

## 2018-12-11 PROCEDURE — 1036F TOBACCO NON-USER: CPT | Performed by: FAMILY MEDICINE

## 2018-12-11 PROCEDURE — 87804 INFLUENZA ASSAY W/OPTIC: CPT | Performed by: FAMILY MEDICINE

## 2018-12-11 PROCEDURE — 3008F BODY MASS INDEX DOCD: CPT | Performed by: FAMILY MEDICINE

## 2018-12-11 PROCEDURE — 87880 STREP A ASSAY W/OPTIC: CPT | Performed by: FAMILY MEDICINE

## 2018-12-11 PROCEDURE — 99213 OFFICE O/P EST LOW 20 MIN: CPT | Performed by: FAMILY MEDICINE

## 2018-12-11 PROCEDURE — 87070 CULTURE OTHR SPECIMN AEROBIC: CPT | Performed by: FAMILY MEDICINE

## 2018-12-11 RX ORDER — AZITHROMYCIN 250 MG/1
TABLET, FILM COATED ORAL
Qty: 6 TABLET | Refills: 0 | Status: SHIPPED | OUTPATIENT
Start: 2018-12-11 | End: 2018-12-15

## 2018-12-11 NOTE — PROGRESS NOTES
FAMILY PRACTICE OFFICE VISIT       NAME: Erik Eric  AGE: 39 y o  SEX: female       : 1975        MRN: [id number]    DATE: 2018  TIME: 3:00 PM    Assessment and Plan     Problem List Items Addressed This Visit     Pharyngitis - Primary    Relevant Medications    azithromycin (ZITHROMAX) 250 mg tablet      Other Visit Diagnoses     Sore throat        Relevant Orders    POCT rapid flu A and B (Completed)    Throat culture    POCT rapid strepA (Completed)        Patient presents with URI symptoms as well as pharyngitis  POC rapid strep as well as flu are both negative  Will send for throat culture  Will start empiric treatment with azithromycin  Recommend Luke warm saltwater gargles  May take acetaminophen alternating with ibuprofen for sore throat pain  Continue with symptomatic treatment and supportive measures including adequate hydration  Return parameters discussed  There are no Patient Instructions on file for this visit  Chief Complaint     Chief Complaint   Patient presents with    Sore Throat     Patient is here due to a S/T, fever and dizzy x 1 day  History of Present Illness     HPI  79-year-old female presents with a 2-3 day h/o runny nose, nasal congestion, ST, dizziness  Denies dizziness at present  Took 2 tyelenol last night  Has been using Gem, honey     Review of Systems   Review of Systems   Constitutional: Negative for chills and fever  HENT: Positive for congestion, rhinorrhea and sore throat  Respiratory: Negative for cough          Active Problem List     Patient Active Problem List   Diagnosis    Hypertriglyceridemia    Elevated blood sugar    Vitamin D deficiency    Neck pain    Generalized anxiety disorder    Insomnia    Impaired fasting glucose    Fatigue    Routine health maintenance    Vitamin B12 deficiency    Nonintractable headache    Left shoulder pain    Pharyngitis       Past Medical History:  No past medical history on file  Past Surgical History:  Past Surgical History:   Procedure Laterality Date     SECTION      CHOLECYSTECTOMY      TOTAL ABDOMINAL HYSTERECTOMY      Due to fibroids  Ovaries present       Family History:  Family History   Problem Relation Age of Onset    Diabetes Mother     Hypertension Mother     Heart attack Mother     Stroke Father        Social History:  Social History     Social History    Marital status: /Civil Union     Spouse name: N/A    Number of children: N/A    Years of education: N/A     Occupational History    Not on file  Social History Main Topics    Smoking status: Never Smoker    Smokeless tobacco: Never Used    Alcohol use No    Drug use: No    Sexual activity: Not on file     Other Topics Concern    Not on file     Social History Narrative    Caffeine use    Description: Tea 1 cup         I have reviewed the patient's medical history in detail; there are no changes to the history as noted in the electronic medical record  Objective     Vitals:    18 0948   BP: 100/70   Pulse: 78   Resp: 16   Temp: (!) 96 7 °F (35 9 °C)     Wt Readings from Last 3 Encounters:   18 81 6 kg (179 lb 12 8 oz)   18 79 8 kg (176 lb)   18 80 3 kg (177 lb)       Physical Exam   Constitutional: She appears well-developed and well-nourished  HENT:   Head: Normocephalic and atraumatic  Mouth/Throat: Oropharynx is clear and moist    TMs intact and clear  Nares with edema noted  Posterior pharynx with drainage as well as erythema noted  Eyes: Pupils are equal, round, and reactive to light  Conjunctivae and EOM are normal    Neck: Normal range of motion  Neck supple  Cardiovascular: Normal rate, regular rhythm and normal heart sounds  Pulmonary/Chest: Effort normal and breath sounds normal  She has no wheezes  Lymphadenopathy:     She has no cervical adenopathy  Nursing note and vitals reviewed        Pertinent Laboratory/Diagnostic Studies:  Lab Results   Component Value Date    BUN 11 07/14/2018    CREATININE 0 69 07/14/2018    CALCIUM 8 5 07/14/2018    K 4 1 07/14/2018    CO2 26 07/14/2018     07/14/2018     Lab Results   Component Value Date    ALT 28 07/14/2018    AST 13 07/14/2018    ALKPHOS 62 07/14/2018       Lab Results   Component Value Date    WBC 6 89 02/21/2018    HGB 14 4 02/21/2018    HCT 39 5 02/21/2018    MCV 82 02/21/2018     02/21/2018       No results found for: TSH    No results found for: CHOL  Lab Results   Component Value Date    TRIG 360 (H) 07/14/2018     Lab Results   Component Value Date    HDL 40 07/14/2018     Lab Results   Component Value Date    LDLCALC 42 07/14/2018     Lab Results   Component Value Date    HGBA1C 5 9 07/14/2018       Results for orders placed or performed in visit on 12/11/18   POCT rapid flu A and B   Result Value Ref Range    RAPID FLU A Negative     RAPID FLU B Negative    POCT rapid strepA   Result Value Ref Range     RAPID STREP A Negative Negative       Orders Placed This Encounter   Procedures    Throat culture    POCT rapid flu A and B    POCT rapid strepA       ALLERGIES:  Allergies   Allergen Reactions    Dust Mite Extract Itching     Itchy, runny nose    Pollen Extract Itching     Itchy, runny nose       Current Medications     Current Outpatient Prescriptions   Medication Sig Dispense Refill    acetaminophen (TYLENOL) 500 mg tablet Take 500 mg by mouth as needed        atorvastatin (LIPITOR) 20 mg tablet TAKE 1 TABLET (20 MG TOTAL) BY MOUTH DAILY AT BEDTIME  3    Cetirizine HCl (ZYRTEC ALLERGY PO) Take 1 tablet by mouth daily      cyanocobalamin (VITAMIN B-12) 1,000 mcg tablet Take 2,000 mcg by mouth daily      ergocalciferol (VITAMIN D2) 50,000 units Take 1 capsule (50,000 Units total) by mouth once a week 12 capsule 0    azithromycin (ZITHROMAX) 250 mg tablet Take 2 tabs first day and 1 tab for days 2-5 6 tablet 0     No current facility-administered medications for this visit            Health Maintenance     Health Maintenance   Topic Date Due    MAMMOGRAM  1975    PAP SMEAR  01/03/1995    INFLUENZA VACCINE  07/01/2018    Depression Screening PHQ  03/09/2019    DTaP,Tdap,and Td Vaccines (2 - Td) 03/09/2026     Immunization History   Administered Date(s) Administered    Fluzone Split Quad 0 5 mL 11/09/2015    Influenza 11/09/2015    Td (adult), adsorbed 03/09/2016    Tdap 01/01/2016       Juan José Diana MD

## 2018-12-12 ENCOUNTER — TELEPHONE (OUTPATIENT)
Dept: FAMILY MEDICINE CLINIC | Facility: CLINIC | Age: 43
End: 2018-12-12

## 2018-12-12 PROBLEM — J02.9 PHARYNGITIS: Status: ACTIVE | Noted: 2018-12-12

## 2018-12-12 NOTE — TELEPHONE ENCOUNTER
----- Message from Frantz May MD sent at 12/12/2018  2:22 PM EST -----  I just received the results of patient's chest x-ray and this is normal   Thank you

## 2018-12-14 ENCOUNTER — TELEPHONE (OUTPATIENT)
Dept: FAMILY MEDICINE CLINIC | Facility: CLINIC | Age: 43
End: 2018-12-14

## 2018-12-14 LAB — BACTERIA THROAT CULT: NORMAL

## 2018-12-14 NOTE — TELEPHONE ENCOUNTER
----- Message from Josemanuel Gibson MD sent at 12/14/2018  3:44 PM EST -----  Please contact patient  Throat culture is negative  I would advise to complete Zithromax Z-Felice that was prescribed by Dr Yen Lovett    If any symptoms  are persisting-please let me know, thank you

## 2018-12-18 ENCOUNTER — LAB (OUTPATIENT)
Dept: LAB | Facility: CLINIC | Age: 43
End: 2018-12-18
Payer: COMMERCIAL

## 2018-12-18 DIAGNOSIS — R51.9 NONINTRACTABLE HEADACHE, UNSPECIFIED CHRONICITY PATTERN, UNSPECIFIED HEADACHE TYPE: ICD-10-CM

## 2018-12-18 DIAGNOSIS — E55.9 VITAMIN D DEFICIENCY: ICD-10-CM

## 2018-12-18 DIAGNOSIS — R73.01 IMPAIRED FASTING GLUCOSE: ICD-10-CM

## 2018-12-18 DIAGNOSIS — E78.1 HYPERTRIGLYCERIDEMIA: ICD-10-CM

## 2018-12-18 DIAGNOSIS — E53.8 VITAMIN B12 DEFICIENCY: ICD-10-CM

## 2018-12-18 LAB
25(OH)D3 SERPL-MCNC: 12.9 NG/ML (ref 30–100)
CHOLEST SERPL-MCNC: 159 MG/DL (ref 50–200)
CRP SERPL QL: <3 MG/L
ERYTHROCYTE [SEDIMENTATION RATE] IN BLOOD: 12 MM/HOUR (ref 0–20)
EST. AVERAGE GLUCOSE BLD GHB EST-MCNC: 151 MG/DL
HBA1C MFR BLD: 6.9 % (ref 4.2–6.3)
HDLC SERPL-MCNC: 34 MG/DL (ref 40–60)
LDLC SERPL CALC-MCNC: 59 MG/DL (ref 0–100)
MAGNESIUM SERPL-MCNC: 2.2 MG/DL (ref 1.6–2.6)
TRIGL SERPL-MCNC: 331 MG/DL
VIT B12 SERPL-MCNC: 318 PG/ML (ref 100–900)

## 2018-12-18 PROCEDURE — 83735 ASSAY OF MAGNESIUM: CPT

## 2018-12-18 PROCEDURE — 86140 C-REACTIVE PROTEIN: CPT

## 2018-12-18 PROCEDURE — 82306 VITAMIN D 25 HYDROXY: CPT

## 2018-12-18 PROCEDURE — 82607 VITAMIN B-12: CPT

## 2018-12-18 PROCEDURE — 85652 RBC SED RATE AUTOMATED: CPT

## 2018-12-18 PROCEDURE — 83036 HEMOGLOBIN GLYCOSYLATED A1C: CPT

## 2018-12-18 PROCEDURE — 36415 COLL VENOUS BLD VENIPUNCTURE: CPT

## 2018-12-18 PROCEDURE — 80061 LIPID PANEL: CPT

## 2018-12-24 DIAGNOSIS — E55.9 VITAMIN D DEFICIENCY: Primary | ICD-10-CM

## 2018-12-24 DIAGNOSIS — E53.8 VITAMIN B12 DEFICIENCY: ICD-10-CM

## 2018-12-24 DIAGNOSIS — E11.9 TYPE 2 DIABETES MELLITUS WITHOUT COMPLICATION, WITHOUT LONG-TERM CURRENT USE OF INSULIN (HCC): ICD-10-CM

## 2018-12-24 RX ORDER — ERGOCALCIFEROL 1.25 MG/1
50000 CAPSULE ORAL WEEKLY
Qty: 12 CAPSULE | Refills: 0 | Status: SHIPPED | OUTPATIENT
Start: 2018-12-24 | End: 2019-09-23

## 2018-12-24 NOTE — PROGRESS NOTES
Can you please let patient know that her total cholesterol as as her bad cholesterol remains good however her good cholesterol is decreased minimally  I would like her to incorporating Mediterranean diet and start routine exercise regimen  In addition, her triglycerides are still elevated however improved from previously  Her her this may be secondary to her elevated blood sugar  Her hemoglobin A1c is now 6 9, in the diabetic range increased from 5 9  I would like patient to work on limiting carbohydrates, avoiding sweets and sugars in starting a routine exercise regimen  Can you ask patient if she is interested in starting a low-dose medication such as metformin to lower her blood sugar? In addition, her vitamin-D remains in the deficient range  I will call in an Rx for her to take weekly for the next 3 months  I will go ahead and recheck this in 3 months along with her hemoglobin A1c  In addition, her vitamin B12 has improved and is now 318 however it is still low  normal   I would like her to continue taking her vitamin B12 supplement and increase vitamin B12 rich foods as well  We will continue monitor and recheck this again in 3 months as well  In addition, her magnesium is within normal range    Thank you

## 2018-12-26 ENCOUNTER — TELEPHONE (OUTPATIENT)
Dept: FAMILY MEDICINE CLINIC | Facility: CLINIC | Age: 43
End: 2018-12-26

## 2018-12-26 NOTE — TELEPHONE ENCOUNTER
----- Message from Alveta Hammans, MD sent at 12/24/2018  2:02 PM EST -----  Can you please let patient know that her total cholesterol as as her bad cholesterol remains good however her good cholesterol is decreased minimally  I would like her to incorporating Mediterranean diet and start routine exercise regimen  In addition, her triglycerides are still elevated however improved from previously  Her her this may be secondary to her elevated blood sugar  Her hemoglobin A1c is now 6 9, in the diabetic range increased from 5 9  I would like patient to work on limiting carbohydrates, avoiding sweets and sugars in starting a routine exercise regimen  Can you ask patient if she is interested in starting a low-dose medication such as metformin to lower her blood sugar? In addition, her vitamin-D remains in the deficient range  I will call in an Rx for her to take weekly for the next 3 months  I will go ahead and recheck this in 3 months along with her hemoglobin A1c  In addition, her vitamin B12 has improved and is now 318 however it is still low  normal   I would like her to continue taking her vitamin B12 supplement and increase vitamin B12 rich foods as well  We will continue monitor and recheck this again in 3 months as well  In addition, her magnesium is within normal range    Thank you

## 2018-12-26 NOTE — TELEPHONE ENCOUNTER
----- Message from Kyaw Santos MD sent at 12/24/2018  2:02 PM EST -----  Can you please let patient know that her total cholesterol as as her bad cholesterol remains good however her good cholesterol is decreased minimally  I would like her to incorporating Mediterranean diet and start routine exercise regimen  In addition, her triglycerides are still elevated however improved from previously  Her her this may be secondary to her elevated blood sugar  Her hemoglobin A1c is now 6 9, in the diabetic range increased from 5 9  I would like patient to work on limiting carbohydrates, avoiding sweets and sugars in starting a routine exercise regimen  Can you ask patient if she is interested in starting a low-dose medication such as metformin to lower her blood sugar? In addition, her vitamin-D remains in the deficient range  I will call in an Rx for her to take weekly for the next 3 months  I will go ahead and recheck this in 3 months along with her hemoglobin A1c  In addition, her vitamin B12 has improved and is now 318 however it is still low  normal   I would like her to continue taking her vitamin B12 supplement and increase vitamin B12 rich foods as well  We will continue monitor and recheck this again in 3 months as well  In addition, her magnesium is within normal range    Thank you

## 2019-03-01 ENCOUNTER — TELEPHONE (OUTPATIENT)
Dept: NEUROLOGY | Facility: CLINIC | Age: 44
End: 2019-03-01

## 2019-03-05 ENCOUNTER — TELEPHONE (OUTPATIENT)
Dept: FAMILY MEDICINE CLINIC | Facility: CLINIC | Age: 44
End: 2019-03-05

## 2019-03-05 NOTE — TELEPHONE ENCOUNTER
Patient called stating that when she was here in December she was asked if she wanted to start Metformin  She had refused at that time but now she would like to start it  She tests her Blood sugars every day at work and now they are over 200  Please send to [facility] in [place] for patient  And call to advise  She also would like to know if you can order her glucometer and all the supplies for her?

## 2019-03-08 ENCOUNTER — OFFICE VISIT (OUTPATIENT)
Dept: FAMILY MEDICINE CLINIC | Facility: CLINIC | Age: 44
End: 2019-03-08
Payer: COMMERCIAL

## 2019-03-08 VITALS
HEIGHT: 64 IN | BODY MASS INDEX: 30.73 KG/M2 | HEART RATE: 80 BPM | WEIGHT: 180 LBS | RESPIRATION RATE: 16 BRPM | SYSTOLIC BLOOD PRESSURE: 112 MMHG | TEMPERATURE: 96 F | DIASTOLIC BLOOD PRESSURE: 70 MMHG

## 2019-03-08 DIAGNOSIS — R30.0 DYSURIA: ICD-10-CM

## 2019-03-08 DIAGNOSIS — E55.9 VITAMIN D DEFICIENCY: ICD-10-CM

## 2019-03-08 DIAGNOSIS — R53.83 FATIGUE, UNSPECIFIED TYPE: ICD-10-CM

## 2019-03-08 DIAGNOSIS — E53.8 VITAMIN B12 DEFICIENCY: ICD-10-CM

## 2019-03-08 DIAGNOSIS — Z00.00 ROUTINE HEALTH MAINTENANCE: ICD-10-CM

## 2019-03-08 DIAGNOSIS — Z12.11 COLON CANCER SCREENING: ICD-10-CM

## 2019-03-08 DIAGNOSIS — E78.1 HYPERTRIGLYCERIDEMIA: Primary | ICD-10-CM

## 2019-03-08 DIAGNOSIS — R51.9 NONINTRACTABLE EPISODIC HEADACHE, UNSPECIFIED HEADACHE TYPE: ICD-10-CM

## 2019-03-08 DIAGNOSIS — E11.9 TYPE 2 DIABETES MELLITUS WITHOUT COMPLICATION, WITHOUT LONG-TERM CURRENT USE OF INSULIN (HCC): ICD-10-CM

## 2019-03-08 LAB
BACTERIA UR QL AUTO: ABNORMAL /HPF
BILIRUB UR QL STRIP: NEGATIVE
CLARITY UR: ABNORMAL
COLOR UR: YELLOW
CREAT UR-MCNC: 119 MG/DL
GLUCOSE UR STRIP-MCNC: ABNORMAL MG/DL
HGB UR QL STRIP.AUTO: ABNORMAL
HYALINE CASTS #/AREA URNS LPF: ABNORMAL /LPF
KETONES UR STRIP-MCNC: ABNORMAL MG/DL
LEUKOCYTE ESTERASE UR QL STRIP: ABNORMAL
MICROALBUMIN UR-MCNC: 7.1 MG/L (ref 0–20)
MICROALBUMIN/CREAT 24H UR: 6 MG/G CREATININE (ref 0–30)
NITRITE UR QL STRIP: NEGATIVE
NON-SQ EPI CELLS URNS QL MICRO: ABNORMAL /HPF
PH UR STRIP.AUTO: 5.5 [PH]
PROT UR STRIP-MCNC: NEGATIVE MG/DL
RBC #/AREA URNS AUTO: ABNORMAL /HPF
SL AMB  POCT GLUCOSE, UA: 250
SL AMB LEUKOCYTE ESTERASE,UA: NORMAL
SL AMB POCT BILIRUBIN,UA: NORMAL
SL AMB POCT BLOOD,UA: NORMAL
SL AMB POCT CLARITY,UA: CLEAR
SL AMB POCT COLOR,UA: YELLOW
SL AMB POCT HEMOGLOBIN AIC: 6.5 (ref ?–6.5)
SL AMB POCT KETONES,UA: NORMAL
SL AMB POCT NITRITE,UA: NORMAL
SL AMB POCT PH,UA: 5
SL AMB POCT SPECIFIC GRAVITY,UA: 1.03
SL AMB POCT URINE PROTEIN: NORMAL
SL AMB POCT UROBILINOGEN: 0.2
SP GR UR STRIP.AUTO: 1.03 (ref 1–1.03)
UROBILINOGEN UR QL STRIP.AUTO: 0.2 E.U./DL
WBC #/AREA URNS AUTO: ABNORMAL /HPF

## 2019-03-08 PROCEDURE — 81001 URINALYSIS AUTO W/SCOPE: CPT | Performed by: FAMILY MEDICINE

## 2019-03-08 PROCEDURE — 3008F BODY MASS INDEX DOCD: CPT | Performed by: FAMILY MEDICINE

## 2019-03-08 PROCEDURE — 3044F HG A1C LEVEL LT 7.0%: CPT | Performed by: FAMILY MEDICINE

## 2019-03-08 PROCEDURE — 99215 OFFICE O/P EST HI 40 MIN: CPT | Performed by: FAMILY MEDICINE

## 2019-03-08 PROCEDURE — 82043 UR ALBUMIN QUANTITATIVE: CPT | Performed by: FAMILY MEDICINE

## 2019-03-08 PROCEDURE — 83036 HEMOGLOBIN GLYCOSYLATED A1C: CPT | Performed by: FAMILY MEDICINE

## 2019-03-08 PROCEDURE — 3061F NEG MICROALBUMINURIA REV: CPT | Performed by: FAMILY MEDICINE

## 2019-03-08 PROCEDURE — 87086 URINE CULTURE/COLONY COUNT: CPT | Performed by: FAMILY MEDICINE

## 2019-03-08 PROCEDURE — 81002 URINALYSIS NONAUTO W/O SCOPE: CPT | Performed by: FAMILY MEDICINE

## 2019-03-08 PROCEDURE — 82570 ASSAY OF URINE CREATININE: CPT | Performed by: FAMILY MEDICINE

## 2019-03-08 NOTE — PROGRESS NOTES
FAMILY PRACTICE OFFICE VISIT       NAME: Merlin Castanon  AGE: 38 y o  SEX: female       : 1975        MRN: [id number]    DATE: 3/9/2019  TIME: 4:54 PM    Assessment and Plan     Problem List Items Addressed This Visit        Other    Hypertriglyceridemia - Primary    Relevant Orders    Lipid Panel with Direct LDL reflex    Comprehensive metabolic panel    Vitamin D deficiency    Fatigue    Relevant Orders    CBC and differential    TSH, 3rd generation with Free T4 reflex    Routine health maintenance    Vitamin B12 deficiency    Nonintractable headache    Relevant Orders    MRI brain w wo contrast      Other Visit Diagnoses     Type 2 diabetes mellitus without complication, without long-term current use of insulin (HCC)        Relevant Medications    metFORMIN (GLUCOPHAGE) 500 mg tablet    Other Relevant Orders    POCT hemoglobin A1c (Completed)    Comprehensive metabolic panel    Ambulatory referral to medical nutrition therapy for diabetes    Microalbumin / creatinine urine ratio (Completed)    Colon cancer screening        Relevant Orders    Ambulatory referral to Gastroenterology    Dysuria        Relevant Orders    POCT urine dip auto non-scope (Completed)    Urine culture    UA (URINE) with reflex to Microscopic (Completed)    Urine culture    Urine Microscopic (Completed)        Hypertriglyceridemia:  Patient continues to take atorvastatin  Have advised on working on lifestyle modifications by increasing heart healthy fats in the diet by incorporating Mediterranean diet, starting routine exercise regimen  Diabetes:  A1c in office noted to be 6 5  Will start patient on metformin 500 mg once daily  I have advised on working on lifestyle modifications including avoiding all sweets, limiting carbohydrates, starting routine exercise regimen  Referral to diabetic nutritionist provided as well  Foot exam completed today, WNL    Patient is up-to-date with eye exam in October however I have encouraged her to reschedule to have a diabetic exam   She is agreeable with this plan  Headaches:  Patient states her headaches are worsening  She never scheduled MRI brain  Have provided her with another Rx  She does have an appointment with Neurology in June  Return/ER parameters discussed  Would benefit from a headache diary  Continue maintain adequate hydration  No red flag signs noted at present  Continue with vitamin-D and vitamin B12 supplementations  Vitamin-D deficiency:  Patient continues take vitamin-D 50,000 International Units weekly  Will recheck vitamin-D level  Vitamin B12 deficiency:  Patient continues to take supplementation  Will recheck vitamin B12 level  Routine health maintenance:  Patient will schedule appoint with GI to discuss initial screening colonoscopy  She will also schedule mammogram   Up-to-date with flu vaccine, Tdap  There are no Patient Instructions on file for this visit  I have spent 40 minutes with Patient  today in which greater than 50% of this time was spent in counseling/coordination of care regarding Diagnostic results, Prognosis, Risks and benefits of tx options, Intructions for management, Patient and family education, Importance of tx compliance and Risk factor reductions  Chief Complaint     Chief Complaint   Patient presents with    Follow-up     Pt is here for follow up for blood sugars        History of Present Illness     HPI   76-year-old female presents today to discuss recent blood sugars that she took at her home  bs noted to be 225, 227 after meals  staretd taking husbands metform 3 days ago  She has been taking half of a 750 mg tablet once daily for the past 3 days       had cold symptoms 1 weeks ago, they have resolved  Does continue with body aches     has been eating well    headcahes have been occurring, worsening  Located over left side of head, neck pain  Headaches usually occur 3-4 times per month  Symptoms experiences weakness on left side of the body   wakes up with headaches  Denies tingling, numbness, weakness over face  Denies visual disturbance  Denies slurred speech  Does take ibuprofen, tyelenol  Last eye exam oct    Review of Systems   Review of Systems   Constitutional: Negative for unexpected weight change  HENT: Negative  Eyes: Negative for visual disturbance  Respiratory: Negative for shortness of breath  Cardiovascular: Negative  Gastrointestinal: Negative for abdominal pain and constipation  Genitourinary: Negative for dysuria  Skin: Negative  Neurological: Positive for headaches  Psychiatric/Behavioral: Negative for dysphoric mood  Active Problem List     Patient Active Problem List   Diagnosis    Hypertriglyceridemia    Elevated blood sugar    Vitamin D deficiency    Neck pain    Generalized anxiety disorder    Insomnia    Impaired fasting glucose    Fatigue    Routine health maintenance    Vitamin B12 deficiency    Nonintractable headache    Left shoulder pain       Past Medical History:  History reviewed  No pertinent past medical history  Past Surgical History:  Past Surgical History:   Procedure Laterality Date     SECTION      CHOLECYSTECTOMY      TOTAL ABDOMINAL HYSTERECTOMY      Due to fibroids   Ovaries present       Family History:  Family History   Problem Relation Age of Onset    Diabetes Mother     Hypertension Mother     Heart attack Mother     Stroke Father        Social History:  Social History     Socioeconomic History    Marital status: /Civil Union     Spouse name: Not on file    Number of children: Not on file    Years of education: Not on file    Highest education level: Not on file   Occupational History    Not on file   Social Needs    Financial resource strain: Not on file    Food insecurity:     Worry: Not on file     Inability: Not on file    Transportation needs:     Medical: Not on file Non-medical: Not on file   Tobacco Use    Smoking status: Never Smoker    Smokeless tobacco: Never Used   Substance and Sexual Activity    Alcohol use: No    Drug use: No    Sexual activity: Not on file   Lifestyle    Physical activity:     Days per week: Not on file     Minutes per session: Not on file    Stress: Not on file   Relationships    Social connections:     Talks on phone: Not on file     Gets together: Not on file     Attends Buddhist service: Not on file     Active member of club or organization: Not on file     Attends meetings of clubs or organizations: Not on file     Relationship status: Not on file    Intimate partner violence:     Fear of current or ex partner: Not on file     Emotionally abused: Not on file     Physically abused: Not on file     Forced sexual activity: Not on file   Other Topics Concern    Not on file   Social History Narrative    Caffeine use    Description: Tea 1 cup     I have reviewed the patient's medical history in detail; there are no changes to the history as noted in the electronic medical record  Objective     Vitals:    03/08/19 1046   BP: 112/70   Pulse: 80   Resp: 16   Temp: (!) 96 °F (35 6 °C)     Wt Readings from Last 3 Encounters:   03/08/19 81 6 kg (180 lb)   12/11/18 81 6 kg (179 lb 12 8 oz)   08/14/18 79 8 kg (176 lb)       Physical Exam   Constitutional: She is oriented to person, place, and time  She appears well-developed and well-nourished  HENT:   Head: Normocephalic and atraumatic  Mouth/Throat: Oropharynx is clear and moist    Eyes: Pupils are equal, round, and reactive to light  Conjunctivae and EOM are normal    Neck: Normal range of motion  Neck supple  No thyromegaly present  Cardiovascular: Normal rate, regular rhythm and normal heart sounds  Pulses are no weak pulses  Pulses:       Dorsalis pedis pulses are 2+ on the right side, and 2+ on the left side     Pulmonary/Chest: Effort normal and breath sounds normal    Abdominal: Soft  Bowel sounds are normal  She exhibits no distension  There is no tenderness  Musculoskeletal: Normal range of motion  She exhibits no edema  Feet:   Right Foot:   Skin Integrity: Negative for ulcer, skin breakdown, erythema, warmth, callus or dry skin  Left Foot:   Skin Integrity: Negative for ulcer, skin breakdown, erythema, warmth, callus or dry skin  Lymphadenopathy:     She has no cervical adenopathy  Neurological: She is alert and oriented to person, place, and time  Psychiatric: She has a normal mood and affect  Nursing note and vitals reviewed  Patient's shoes and socks removed  Right Foot/Ankle   Right Foot Inspection  Skin Exam: skin normal skin not intact, no dry skin, no warmth, no callus, no erythema, no maceration, no abnormal color, no pre-ulcer, no ulcer and no callus                          Toe Exam: ROM and strength within normal limits  Sensory     Proprioception: intact   Monofilament testing: intact  Vascular    The right DP pulse is 2+  Left Foot/Ankle  Left Foot Inspection  Skin Exam: skin normalskin not intact, no dry skin, no warmth, no erythema, no maceration, normal color, no pre-ulcer, no ulcer and no callus                         Toe Exam: ROM and strength within normal limits                   Sensory     Proprioception: intact  Monofilament: intact  Vascular    The left DP pulse is 2+  Assign Risk Category:  No deformity present; No loss of protective sensation;  No weak pulses       Risk: 0      Pertinent Laboratory/Diagnostic Studies:  Lab Results   Component Value Date    BUN 11 07/14/2018    CREATININE 0 69 07/14/2018    CALCIUM 8 5 07/14/2018    K 4 1 07/14/2018    CO2 26 07/14/2018     07/14/2018     Lab Results   Component Value Date    ALT 28 07/14/2018    AST 13 07/14/2018    ALKPHOS 62 07/14/2018       Lab Results   Component Value Date    WBC 6 89 02/21/2018    HGB 14 4 02/21/2018    HCT 39 5 02/21/2018    MCV 82 02/21/2018     02/21/2018       No results found for: TSH    No results found for: CHOL  Lab Results   Component Value Date    TRIG 331 (H) 12/18/2018     Lab Results   Component Value Date    HDL 34 (L) 12/18/2018     Lab Results   Component Value Date    LDLCALC 59 12/18/2018     Lab Results   Component Value Date    HGBA1C 6 5 03/08/2019       Results for orders placed or performed in visit on 03/08/19   Microalbumin / creatinine urine ratio   Result Value Ref Range    Creatinine, Ur 119 0 mg/dL    Microalbum  ,U,Random 7 1 0 0 - 20 0 mg/L    Microalb Creat Ratio 6 0 - 30 mg/g creatinine   UA (URINE) with reflex to Microscopic   Result Value Ref Range    Color, UA Yellow     Clarity, UA Cloudy     Specific Richards, UA 1 027 1 003 - 1 030    pH, UA 5 5 4 5, 5 0, 5 5, 6 0, 6 5, 7 0, 7 5, 8 0    Leukocytes, UA (A) Negative     Elevated glucose may cause decreased leukocyte values   See urine microscopic for [facility] result/    Nitrite, UA Negative Negative    Protein, UA Negative Negative mg/dl    Glucose, UA >=1000 (1%) (A) Negative mg/dl    Ketones, UA Trace (A) Negative mg/dl    Urobilinogen, UA 0 2 0 2, 1 0 E U /dl E U /dl    Bilirubin, UA Negative Negative    Blood, UA Small (A) Negative   Urine Microscopic   Result Value Ref Range    RBC, UA 2-4 (A) None Seen, 0-5 /hpf    WBC, UA None Seen None Seen, 0-5, 5-55, 5-65 /hpf    Epithelial Cells Occasional None Seen, Occasional /hpf    Bacteria, UA Occasional None Seen, Occasional /hpf    Hyaline Casts, UA None Seen None Seen /lpf   POCT hemoglobin A1c   Result Value Ref Range    Hemoglobin A1C 6 5 6 5   POCT urine dip auto non-scope   Result Value Ref Range     COLOR,UA yellow     CLARITY,UA clear     SPECIFIC GRAVITY,UA 1 030      PH,UA 5 0     LEUKOCYTE ESTERASE,UA -     NITRITE,UA -     GLUCOSE,      KETONES,UA -     BILIRUBIN,UA -     BLOOD,UA +     POCT URINE PROTEIN -     SL AMB POCT UROBILINOGEN 0 2        Orders Placed This Encounter   Procedures    Urine culture    Urine culture    MRI brain w wo contrast    Lipid Panel with Direct LDL reflex    Comprehensive metabolic panel    CBC and differential    TSH, 3rd generation with Free T4 reflex    Microalbumin / creatinine urine ratio    UA (URINE) with reflex to Microscopic    Urine Microscopic    Ambulatory referral to medical nutrition therapy for diabetes    Ambulatory referral to Gastroenterology    POCT hemoglobin A1c    POCT urine dip auto non-scope       ALLERGIES:  Allergies   Allergen Reactions    Dust Mite Extract Itching     Itchy, runny nose    Pollen Extract Itching     Itchy, runny nose       Current Medications     Current Outpatient Medications   Medication Sig Dispense Refill    acetaminophen (TYLENOL) 500 mg tablet Take 500 mg by mouth as needed        Cetirizine HCl (ZYRTEC ALLERGY PO) Take 1 tablet by mouth daily      cyanocobalamin (VITAMIN B-12) 1,000 mcg tablet Take 2,000 mcg by mouth daily      ergocalciferol (VITAMIN D2) 50,000 units Take 1 capsule (50,000 Units total) by mouth once a week 12 capsule 0    atorvastatin (LIPITOR) 20 mg tablet TAKE 1 TABLET (20 MG TOTAL) BY MOUTH DAILY AT BEDTIME  3    metFORMIN (GLUCOPHAGE) 500 mg tablet Take 1 tablet (500 mg total) by mouth 2 (two) times a day with meals 30 tablet 3     No current facility-administered medications for this visit            Health Maintenance     Health Maintenance   Topic Date Due    MAMMOGRAM  1975    BMI: Followup Plan  01/03/1992    HEPATITIS B VACCINES (1 of 3 - Risk 3-dose series) 01/03/1993    Pneumococcal PPSV23 Medium Risk Adult (1 of 1 - PPSV23) 01/03/1993    HEMOGLOBIN A1C  09/08/2019    BMI: Adult  03/08/2020    DTaP,Tdap,and Td Vaccines (3 - Td) 03/09/2026    INFLUENZA VACCINE  Completed     Immunization History   Administered Date(s) Administered    Fluzone Split Quad 0 5 mL 11/09/2015    INFLUENZA 11/09/2015, 11/01/2018    Td (adult), adsorbed 03/09/2016    Tdap 01/01/2016       Jolie Tommy Haney MD

## 2019-03-09 PROBLEM — J02.9 PHARYNGITIS: Status: RESOLVED | Noted: 2018-12-12 | Resolved: 2019-03-09

## 2019-03-09 LAB — BACTERIA UR CULT: NORMAL

## 2019-03-10 NOTE — RESULT ENCOUNTER NOTE
Can you please let patient know that her urine did show some sugar as well as microscopic blood  I would like her to drop-off another sample in next 1-2 weeks to ensure that these findings are clear  I would like her to drink 1-2 glasses of water prior to providing a urine sample    Thank you

## 2019-03-11 ENCOUNTER — TELEPHONE (OUTPATIENT)
Dept: FAMILY MEDICINE CLINIC | Facility: CLINIC | Age: 44
End: 2019-03-11

## 2019-03-11 NOTE — TELEPHONE ENCOUNTER
----- Message from Lebron Conte MD sent at 3/10/2019  2:37 PM EDT -----  Can you please let patient know that her urine did show some sugar as well as microscopic blood  I would like her to drop-off another sample in next 1-2 weeks to ensure that these findings are clear  I would like her to drink 1-2 glasses of water prior to providing a urine sample    Thank you

## 2019-03-22 ENCOUNTER — TELEPHONE (OUTPATIENT)
Dept: OTHER | Facility: OTHER | Age: 44
End: 2019-03-22

## 2019-03-22 ENCOUNTER — CLINICAL SUPPORT (OUTPATIENT)
Dept: FAMILY MEDICINE CLINIC | Facility: CLINIC | Age: 44
End: 2019-03-22
Payer: COMMERCIAL

## 2019-03-22 ENCOUNTER — TRANSCRIBE ORDERS (OUTPATIENT)
Dept: LAB | Facility: CLINIC | Age: 44
End: 2019-03-22

## 2019-03-22 ENCOUNTER — LAB (OUTPATIENT)
Dept: LAB | Facility: CLINIC | Age: 44
End: 2019-03-22
Payer: COMMERCIAL

## 2019-03-22 DIAGNOSIS — E55.9 VITAMIN D DEFICIENCY: ICD-10-CM

## 2019-03-22 DIAGNOSIS — E53.8 VITAMIN B12 DEFICIENCY: ICD-10-CM

## 2019-03-22 DIAGNOSIS — R31.9 HEMATURIA, UNSPECIFIED TYPE: Primary | ICD-10-CM

## 2019-03-22 DIAGNOSIS — E11.9 TYPE 2 DIABETES MELLITUS WITHOUT COMPLICATION, WITHOUT LONG-TERM CURRENT USE OF INSULIN (HCC): ICD-10-CM

## 2019-03-22 DIAGNOSIS — R53.83 FATIGUE, UNSPECIFIED TYPE: ICD-10-CM

## 2019-03-22 DIAGNOSIS — E78.1 HYPERTRIGLYCERIDEMIA: ICD-10-CM

## 2019-03-22 LAB
25(OH)D3 SERPL-MCNC: 24.8 NG/ML (ref 30–100)
ALBUMIN SERPL BCP-MCNC: 3.7 G/DL (ref 3.5–5)
ALP SERPL-CCNC: 53 U/L (ref 46–116)
ALT SERPL W P-5'-P-CCNC: 21 U/L (ref 12–78)
ANION GAP SERPL CALCULATED.3IONS-SCNC: 4 MMOL/L (ref 4–13)
AST SERPL W P-5'-P-CCNC: 10 U/L (ref 5–45)
BASOPHILS # BLD AUTO: 0.06 THOUSANDS/ΜL (ref 0–0.1)
BASOPHILS NFR BLD AUTO: 1 % (ref 0–1)
BILIRUB SERPL-MCNC: 0.93 MG/DL (ref 0.2–1)
BILIRUB UR QL STRIP: NEGATIVE
BUN SERPL-MCNC: 10 MG/DL (ref 5–25)
CALCIUM SERPL-MCNC: 8.7 MG/DL (ref 8.3–10.1)
CHLORIDE SERPL-SCNC: 105 MMOL/L (ref 100–108)
CHOLEST SERPL-MCNC: 149 MG/DL (ref 50–200)
CLARITY UR: NORMAL
CO2 SERPL-SCNC: 25 MMOL/L (ref 21–32)
COLOR UR: YELLOW
CREAT SERPL-MCNC: 0.66 MG/DL (ref 0.6–1.3)
EOSINOPHIL # BLD AUTO: 0.21 THOUSAND/ΜL (ref 0–0.61)
EOSINOPHIL NFR BLD AUTO: 2 % (ref 0–6)
ERYTHROCYTE [DISTWIDTH] IN BLOOD BY AUTOMATED COUNT: 12.3 % (ref 11.6–15.1)
EST. AVERAGE GLUCOSE BLD GHB EST-MCNC: 134 MG/DL
GFR SERPL CREATININE-BSD FRML MDRD: 107 ML/MIN/1.73SQ M
GLUCOSE P FAST SERPL-MCNC: 100 MG/DL (ref 65–99)
GLUCOSE UR STRIP-MCNC: NEGATIVE MG/DL
HBA1C MFR BLD: 6.3 % (ref 4.2–6.3)
HCT VFR BLD AUTO: 38.6 % (ref 34.8–46.1)
HDLC SERPL-MCNC: 35 MG/DL (ref 40–60)
HGB BLD-MCNC: 13.3 G/DL (ref 11.5–15.4)
HGB UR QL STRIP.AUTO: NEGATIVE
IMM GRANULOCYTES # BLD AUTO: 0.02 THOUSAND/UL (ref 0–0.2)
IMM GRANULOCYTES NFR BLD AUTO: 0 % (ref 0–2)
KETONES UR STRIP-MCNC: NEGATIVE MG/DL
LDLC SERPL CALC-MCNC: 65 MG/DL (ref 0–100)
LEUKOCYTE ESTERASE UR QL STRIP: NEGATIVE
LYMPHOCYTES # BLD AUTO: 2.66 THOUSANDS/ΜL (ref 0.6–4.47)
LYMPHOCYTES NFR BLD AUTO: 29 % (ref 14–44)
MCH RBC QN AUTO: 29.5 PG (ref 26.8–34.3)
MCHC RBC AUTO-ENTMCNC: 34.5 G/DL (ref 31.4–37.4)
MCV RBC AUTO: 86 FL (ref 82–98)
MONOCYTES # BLD AUTO: 0.77 THOUSAND/ΜL (ref 0.17–1.22)
MONOCYTES NFR BLD AUTO: 8 % (ref 4–12)
NEUTROPHILS # BLD AUTO: 5.54 THOUSANDS/ΜL (ref 1.85–7.62)
NEUTS SEG NFR BLD AUTO: 60 % (ref 43–75)
NITRITE UR QL STRIP: NEGATIVE
NRBC BLD AUTO-RTO: 0 /100 WBCS
PH UR STRIP.AUTO: 6 [PH]
PLATELET # BLD AUTO: 233 THOUSANDS/UL (ref 149–390)
PMV BLD AUTO: 11.3 FL (ref 8.9–12.7)
POTASSIUM SERPL-SCNC: 3.8 MMOL/L (ref 3.5–5.3)
PROT SERPL-MCNC: 7.1 G/DL (ref 6.4–8.2)
PROT UR STRIP-MCNC: NEGATIVE MG/DL
RBC # BLD AUTO: 4.51 MILLION/UL (ref 3.81–5.12)
SL AMB  POCT GLUCOSE, UA: NEGATIVE
SL AMB LEUKOCYTE ESTERASE,UA: NEGATIVE
SL AMB POCT BILIRUBIN,UA: NEGATIVE
SL AMB POCT BLOOD,UA: ABNORMAL
SL AMB POCT CLARITY,UA: CLEAR
SL AMB POCT COLOR,UA: YELLOW
SL AMB POCT KETONES,UA: NEGATIVE
SL AMB POCT NITRITE,UA: NEGATIVE
SL AMB POCT PH,UA: 5
SL AMB POCT SPECIFIC GRAVITY,UA: 1.01
SL AMB POCT URINE PROTEIN: NEGATIVE
SL AMB POCT UROBILINOGEN: 0.2
SODIUM SERPL-SCNC: 134 MMOL/L (ref 136–145)
SP GR UR STRIP.AUTO: 1.01 (ref 1–1.03)
TRIGL SERPL-MCNC: 247 MG/DL
TSH SERPL DL<=0.05 MIU/L-ACNC: 2.41 UIU/ML (ref 0.36–3.74)
UROBILINOGEN UR QL STRIP.AUTO: 0.2 E.U./DL
VIT B12 SERPL-MCNC: 276 PG/ML (ref 100–900)
WBC # BLD AUTO: 9.26 THOUSAND/UL (ref 4.31–10.16)

## 2019-03-22 PROCEDURE — 80053 COMPREHEN METABOLIC PANEL: CPT

## 2019-03-22 PROCEDURE — 82306 VITAMIN D 25 HYDROXY: CPT

## 2019-03-22 PROCEDURE — 85025 COMPLETE CBC W/AUTO DIFF WBC: CPT

## 2019-03-22 PROCEDURE — 81003 URINALYSIS AUTO W/O SCOPE: CPT | Performed by: FAMILY MEDICINE

## 2019-03-22 PROCEDURE — 36415 COLL VENOUS BLD VENIPUNCTURE: CPT

## 2019-03-22 PROCEDURE — 81002 URINALYSIS NONAUTO W/O SCOPE: CPT

## 2019-03-22 PROCEDURE — 83036 HEMOGLOBIN GLYCOSYLATED A1C: CPT

## 2019-03-22 PROCEDURE — 80061 LIPID PANEL: CPT

## 2019-03-22 PROCEDURE — 87086 URINE CULTURE/COLONY COUNT: CPT | Performed by: FAMILY MEDICINE

## 2019-03-22 PROCEDURE — 82607 VITAMIN B-12: CPT

## 2019-03-22 PROCEDURE — 84443 ASSAY THYROID STIM HORMONE: CPT

## 2019-03-22 NOTE — RESULT ENCOUNTER NOTE
Can you please let patient know that she does have microscopic blood again  Can be sent out for urinalysis and culture  I will let her know the final results  If it does have persistent microscopic blood, then I would like her to go to a urologist   I will let her know    Thank you

## 2019-03-24 LAB — BACTERIA UR CULT: NORMAL

## 2019-03-25 ENCOUNTER — TELEPHONE (OUTPATIENT)
Dept: FAMILY MEDICINE CLINIC | Facility: CLINIC | Age: 44
End: 2019-03-25

## 2019-03-25 NOTE — RESULT ENCOUNTER NOTE
Can you please let patient know that her final urine was normal   It did not show any microscopic blood or bacteria    Thank you

## 2019-03-25 NOTE — TELEPHONE ENCOUNTER
----- Message from Shabbir Galarza MD sent at 3/25/2019 11:49 AM EDT -----  Can you please let patient know that her final urine was normal   It did not show any microscopic blood or bacteria    Thank you

## 2019-03-27 DIAGNOSIS — E55.9 VITAMIN D DEFICIENCY: ICD-10-CM

## 2019-03-29 ENCOUNTER — TELEPHONE (OUTPATIENT)
Dept: FAMILY MEDICINE CLINIC | Facility: CLINIC | Age: 44
End: 2019-03-29

## 2019-03-29 NOTE — RESULT ENCOUNTER NOTE
Can you please let patient know that her blood work including liver, kidneys, thyroid was within normal range  Her hemoglobin A1c has improved and is noted to be 6 3, decreased from 6 5  Her cholesterol is overall stable and her triglycerides have improved however they are still higher  I would like her to start a fish oil 1000 mg twice daily  I would like her to continue on increasing heart healthy fats in the diet by incorporating Mediterranean diet and routine exercise regimen  In addition, her vitamin B12 is lower than last time and would like to know how much vitamin B12 she is taking  In addition, her vitamin-D is no longer the deficient range and is improved however I would like her to continue taking a supplement of 4000 International Units until April and decreasing it to 2000 in the summer months    Thank you

## 2019-03-29 NOTE — TELEPHONE ENCOUNTER
----- Message from Avni Patel MD sent at 3/29/2019  1:51 PM EDT -----  Can you please let patient know that her blood work including liver, kidneys, thyroid was within normal range  Her hemoglobin A1c has improved and is noted to be 6 3, decreased from 6 5  Her cholesterol is overall stable and her triglycerides have improved however they are still higher  I would like her to start a fish oil 1000 mg twice daily  I would like her to continue on increasing heart healthy fats in the diet by incorporating Mediterranean diet and routine exercise regimen  In addition, her vitamin B12 is lower than last time and would like to know how much vitamin B12 she is taking  In addition, her vitamin-D is no longer the deficient range and is improved however I would like her to continue taking a supplement of 4000 International Units until April and decreasing it to 2000 in the summer months    Thank you

## 2019-03-30 ENCOUNTER — TELEPHONE (OUTPATIENT)
Dept: FAMILY MEDICINE CLINIC | Facility: CLINIC | Age: 44
End: 2019-03-30

## 2019-03-30 DIAGNOSIS — E11.9 TYPE 2 DIABETES MELLITUS WITHOUT COMPLICATION, WITHOUT LONG-TERM CURRENT USE OF INSULIN (HCC): ICD-10-CM

## 2019-03-30 NOTE — TELEPHONE ENCOUNTER
----- Message from Alveta Hammans, MD sent at 3/29/2019  1:51 PM EDT -----  Can you please let patient know that her blood work including liver, kidneys, thyroid was within normal range  Her hemoglobin A1c has improved and is noted to be 6 3, decreased from 6 5  Her cholesterol is overall stable and her triglycerides have improved however they are still higher  I would like her to start a fish oil 1000 mg twice daily  I would like her to continue on increasing heart healthy fats in the diet by incorporating Mediterranean diet and routine exercise regimen  In addition, her vitamin B12 is lower than last time and would like to know how much vitamin B12 she is taking  In addition, her vitamin-D is no longer the deficient range and is improved however I would like her to continue taking a supplement of 4000 International Units until April and decreasing it to 2000 in the summer months    Thank you

## 2019-04-04 ENCOUNTER — HOSPITAL ENCOUNTER (OUTPATIENT)
Dept: RADIOLOGY | Facility: HOSPITAL | Age: 44
Discharge: HOME | End: 2019-04-04
Payer: COMMERCIAL

## 2019-04-04 DIAGNOSIS — R51.9 NONINTRACTABLE EPISODIC HEADACHE, UNSPECIFIED HEADACHE TYPE: ICD-10-CM

## 2019-04-04 PROCEDURE — 70553 MRI BRAIN STEM W/O & W/DYE: CPT

## 2019-04-04 PROCEDURE — A9585 GADOBUTROL INJECTION: HCPCS | Performed by: FAMILY MEDICINE

## 2019-04-04 RX ADMIN — GADOBUTROL 8 ML: 604.72 INJECTION INTRAVENOUS at 09:39

## 2019-04-05 ENCOUNTER — TELEPHONE (OUTPATIENT)
Dept: FAMILY MEDICINE CLINIC | Facility: CLINIC | Age: 44
End: 2019-04-05

## 2019-04-14 RX ORDER — ERGOCALCIFEROL 1.25 MG/1
CAPSULE ORAL
Qty: 12 CAPSULE | Refills: 0 | OUTPATIENT
Start: 2019-04-14

## 2019-04-29 NOTE — TELEPHONE ENCOUNTER
Left message for call back What Is The Reason For Today's Visit?: Full Body Skin Examination What Is The Reason For Today's Visit? (Being Monitored For X): concerning skin lesions on an annual basis How Severe Are Your Spot(S)?: moderate

## 2019-04-29 NOTE — TELEPHONE ENCOUNTER
Neck & Shoulder Pain - rescheduled from 10/2018 consult-   no changes from 10/2018 consult- no pm in between,    Patient advised that they should receive the NP paperwork in the mail prior to their appointment  If they do not receive the paperwork; or if the appointment is within 3-7 days they can print it off the spine and pain website:  [employer] or Arrive 45 minutes prior to their appointment time, or retrieve it from the practice in advance to their appointment  It will take approximately 30 minutes to complete         Patient requests NP pw mailed to address confirmed on file-- ASAP

## 2019-05-06 ENCOUNTER — CLINICAL SUPPORT (OUTPATIENT)
Dept: PAIN MEDICINE | Facility: CLINIC | Age: 44
End: 2019-05-06
Payer: COMMERCIAL

## 2019-05-06 VITALS
TEMPERATURE: 97.9 F | HEART RATE: 78 BPM | BODY MASS INDEX: 30.59 KG/M2 | DIASTOLIC BLOOD PRESSURE: 74 MMHG | SYSTOLIC BLOOD PRESSURE: 105 MMHG | WEIGHT: 179.2 LBS | HEIGHT: 64 IN

## 2019-05-06 DIAGNOSIS — M54.12 CERVICAL RADICULOPATHY: Primary | ICD-10-CM

## 2019-05-06 DIAGNOSIS — M79.18 MYOFASCIAL PAIN: ICD-10-CM

## 2019-05-06 PROCEDURE — 99244 OFF/OP CNSLTJ NEW/EST MOD 40: CPT | Performed by: ANESTHESIOLOGY

## 2019-05-06 RX ORDER — METHOCARBAMOL 750 MG/1
750 TABLET, FILM COATED ORAL 3 TIMES DAILY PRN
Qty: 90 TABLET | Refills: 1 | Status: SHIPPED | OUTPATIENT
Start: 2019-05-06 | End: 2020-01-13

## 2019-05-06 RX ORDER — MELOXICAM 15 MG/1
15 TABLET ORAL DAILY PRN
Qty: 30 TABLET | Refills: 1 | Status: SHIPPED | OUTPATIENT
Start: 2019-05-06 | End: 2020-01-13

## 2019-07-15 ENCOUNTER — HOSPITAL ENCOUNTER (OUTPATIENT)
Dept: MAMMOGRAPHY | Facility: CLINIC | Age: 44
Discharge: HOME | End: 2019-07-15
Payer: COMMERCIAL

## 2019-07-15 VITALS — BODY MASS INDEX: 28.77 KG/M2 | HEIGHT: 66 IN | WEIGHT: 179 LBS

## 2019-07-15 DIAGNOSIS — Z12.31 SCREENING MAMMOGRAM, ENCOUNTER FOR: ICD-10-CM

## 2019-07-15 PROCEDURE — 77067 SCR MAMMO BI INCL CAD: CPT

## 2019-07-15 PROCEDURE — 77063 BREAST TOMOSYNTHESIS BI: CPT

## 2019-07-22 ENCOUNTER — EVALUATION (OUTPATIENT)
Dept: PHYSICAL THERAPY | Facility: REHABILITATION | Age: 44
End: 2019-07-22
Payer: COMMERCIAL

## 2019-07-22 DIAGNOSIS — M54.12 CERVICAL RADICULOPATHY: ICD-10-CM

## 2019-07-22 DIAGNOSIS — M79.18 CERVICAL MYOFASCIAL PAIN SYNDROME: Primary | ICD-10-CM

## 2019-07-22 PROCEDURE — 97162 PT EVAL MOD COMPLEX 30 MIN: CPT | Performed by: PHYSICAL THERAPIST

## 2019-07-22 NOTE — PROGRESS NOTES
PT Evaluation     Today's date: 2019  Patient name: Marina Maier  : 1975  MRN: [id number]  Referring provider: Lena Huertas DO  Dx:   Encounter Diagnosis     ICD-10-CM    1  Cervical radiculopathy M54 12 Ambulatory referral to Physical Therapy                  Assessment  Assessment details: Marina Maier is a 38 y o  female with significant medical history of anxiety who presents with chief complaint of bilateral cervical pain with L>R  Bolivar George presents with evaluation findings of significant muscle tension throughout the upper trap region, decreased postural strength and decreased DNF endurance  In addition to these findings she also presents with subjective findings of poor sleep quality, increased stress and anxiety and poor self care habits  These deficits are manifested functionally in pain in fatigue throughout her day and with work  Bolivar George will benefit from physical therapy to improve postural strength and control, decrease pain and allow for return to daily life without pain  Impairments: abnormal muscle firing, abnormal muscle tone, impaired physical strength, lacks appropriate home exercise program, pain with function and poor posture     Symptom irritability: moderateUnderstanding of Dx/Px/POC: good   Prognosis: fair  Prognosis details: (-) poor sleep, poor self care and pt preference to be seen 1x a week    Goals  Short Term  1: Patient will report a 50% decrease in pain in 2-4 weeks  2: Pt will have DNF endurance of >20 seconds in 6 weeks  3  Pt will have scap strength 4/5 in 6 weeks  Long Term  1: Patient will demonstrate independence in home exercise program by discharge  2: Patient will have FOTO score of 68 indicating improved function in 8 weeks  Plan  Plan details: Recommended 2x a week  Pt declined secondary to her personal schedule     Patient would benefit from: skilled physical therapy  Planned therapy interventions: joint mobilization, manual therapy, neuromuscular re-education, patient education, therapeutic exercise, therapeutic activities, behavior modification and self care  Frequency: 1x week  Duration in weeks: 12  Treatment plan discussed with: patient        Subjective Evaluation    History of Present Illness  Mechanism of injury: Pt reports that she has pain both in her neck and her shoulder  States that she has stiffness in the morning when she wakes up  States that she has pain in her L shoulder  Pain has been present for 6 months  Does admit that 2 years ago she hurt her L clavicle by lifting somebody at work  Pain has increased over the last 6 months  Pt reports that she has tried massage and heat which she states help  Pt reports that she sometimes experiences numbness/weakness from her shoulder to her hand  States she has had a cardiac workup and a brain MRI which were negative and her MD suggested that it may be anxiety related  Pt reports that she is exhausted but has trouble sleeping  States that she notices that her numbness increases when she does not sleep well  States there are several nights where she only sleeps for about 4 hours  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  Also reports numerous household and family responsibilities and admits that she puts herself last      States that currently she is able to perform activity throughout the day but the pain makes her tired and uncomfortable  States it is worst in the morning  States it is not a sharp but it is uncomfortable  Pt goals for therapy include decreasing pain, states she wants to be really good and not tired all of the time  Pain  At best pain ratin  At worst pain ratin          Objective     Concurrent Complaints  Positive for disturbed sleep and headaches   Negative for night pain, dizziness, faints and history of trauma    Postural Observations  Seated posture: poor  Standing posture: poor    Additional Postural Observation Details  Increased kyphosis, forward head and shoulders    Palpation   Left   Muscle spasm in the levator scapulae, rhomboids, scalenes, sternocleidomastoid and upper trapezius  Right   Muscle spasm in the levator scapulae, rhomboids, scalenes, sternocleidomastoid and upper trapezius  Additional Palpation Details  Significant upper trap spasm L worse then R    Tenderness   Cervical Spine   Tenderness in the left ribs and right ribs  Additional Tenderness Details  First rib hypomobile L>R    Neurological Testing     Sensation   Cervical/Thoracic   Left   Intact: light touch    Right   Intact: light touch    Reflexes   Left   Biceps (C5/C6): normal (2+)  Brachioradialis (C6): normal (2+)    Right   Biceps (C5/C6): normal (2+)  Brachioradialis (C6): normal (2+)    Active Range of Motion     Additional Active Range of Motion Details  Pain with rotation BL and extension  ROM lacking 10 degrees in each directions  Joint Play     Hypermobile: C5 and C6     Hypomobile: T1, T2, T3, T4, T5, T6 and 1st rib     Pain: C2, C3, C4, C5, C6, C7, T1, T2, T3, T4, T5, T6 and 1st rib     Strength/Myotome Testing     Left Shoulder     Planes of Motion   Abduction: 5     Isolated Muscles   Lower trapezius: 3-   Middle trapezius: 4-     Right Shoulder     Planes of Motion   Abduction: 5     Isolated Muscles   Lower trapezius: 3-   Middle trapezius: 4-     Left Elbow   Flexion: 5  Extension: 5    Right Elbow   Flexion: 5  Extension: 5    Left Wrist/Hand   Wrist extension: 5  Wrist flexion: 5    Right Wrist/Hand   Wrist extension: 5  Wrist flexion: 5    Additional Strength Details  DNF endurence 2 seconds    Tests   Cervical   Negative vertical compression, lumbar distraction, alar ligament test, Sharp-Denisa test and VBI  Left   Negative Spurling's Test B  Right   Negative Spurling's Test B  Lumbar   Negative vertical compression       General Comments:      Shoulder Comments   Active shoulder ROM WNL, pt reports pain at end range flexion  Neuro Exam:     Headaches   Patient reports headaches: Yes  Pt inquired about supplements for sleep, stating that a co-worker of her mentioned them  Pt was given education concerning both magnesium and melatonin and encouraged to follow up on the conversation with her PCP before making any changes  Pt education performed on the importance of stress reduction and self care specifically for myofascial neck pain  Pt receptive however she identified barriers of time, the needs of her family and work            Precautions: anxiety      Manual  7/22            Upper trap STM             First rib moblization             Thoracic  mobilization                                           Exercise Diary  7/22            Chin tucks             BL ER             t-band rows             t-band extensions             Wall slides             t's             y's                                                                                                                                                                                          Modalities              Heat to start with breath

## 2019-07-29 ENCOUNTER — OFFICE VISIT (OUTPATIENT)
Dept: PHYSICAL THERAPY | Facility: REHABILITATION | Age: 44
End: 2019-07-29
Payer: COMMERCIAL

## 2019-07-29 DIAGNOSIS — M54.12 CERVICAL RADICULOPATHY: ICD-10-CM

## 2019-07-29 DIAGNOSIS — M79.18 CERVICAL MYOFASCIAL PAIN SYNDROME: Primary | ICD-10-CM

## 2019-07-29 PROCEDURE — 97140 MANUAL THERAPY 1/> REGIONS: CPT | Performed by: PHYSICAL THERAPIST

## 2019-07-29 PROCEDURE — 97112 NEUROMUSCULAR REEDUCATION: CPT | Performed by: PHYSICAL THERAPIST

## 2019-07-29 NOTE — PROGRESS NOTES
Daily Note     Today's date: 2019  Patient name: Abdias Gonzalez  : 1975  MRN: [id number]  Referring provider: Angeli Pagan DO  Dx:   Encounter Diagnosis     ICD-10-CM    1  Cervical myofascial pain syndrome M79 18    2  Cervical radiculopathy M54 12                   Subjective: Pt reports that she is doing okay  States she had some pain yesterday  Objective: See treatment diary below      Assessment: Pt continues to present with pain in the area of the L upper trap  Hypomobility noted in 1st and 2nd rib  Tolerated treatment well  Patient demonstrated fatigue post treatment  Plan: Continue per plan of care        Precautions: anxiety      Manual              Upper trap STM EDITH            First rib moblization EDITH            Thoracic  mobilization EDITH                                          Exercise Diary              Chin tucks 10x2 breaths            BL ER 2x10 OTB            t-band rows GTB 2x10            t-band extensions             Wall slides 2x10            t's             y's                                                                                                                                                                                          Modalities              Heat to start with breath 8'

## 2019-08-05 ENCOUNTER — APPOINTMENT (OUTPATIENT)
Dept: PHYSICAL THERAPY | Facility: REHABILITATION | Age: 44
End: 2019-08-05
Payer: COMMERCIAL

## 2019-08-19 ENCOUNTER — OFFICE VISIT (OUTPATIENT)
Dept: PHYSICAL THERAPY | Facility: REHABILITATION | Age: 44
End: 2019-08-19
Payer: COMMERCIAL

## 2019-08-19 DIAGNOSIS — M79.18 CERVICAL MYOFASCIAL PAIN SYNDROME: Primary | ICD-10-CM

## 2019-08-19 DIAGNOSIS — M54.12 CERVICAL RADICULOPATHY: ICD-10-CM

## 2019-08-19 PROCEDURE — 97140 MANUAL THERAPY 1/> REGIONS: CPT | Performed by: PHYSICAL THERAPIST

## 2019-08-19 PROCEDURE — 97110 THERAPEUTIC EXERCISES: CPT | Performed by: PHYSICAL THERAPIST

## 2019-08-19 PROCEDURE — 97112 NEUROMUSCULAR REEDUCATION: CPT | Performed by: PHYSICAL THERAPIST

## 2019-08-19 NOTE — PROGRESS NOTES
Daily Note     Today's date: 2019  Patient name: Yifan Chao  : 1975  MRN: [id number]  Referring provider: Loreto Ballard DO  Dx:   Encounter Diagnosis     ICD-10-CM    1  Cervical myofascial pain syndrome M79 18    2  Cervical radiculopathy M54 12                   Subjective: Pt reports that she feels better then last time she was here  States her neck is now more "comfortable"  Objective: See treatment diary below      Assessment: Pt presents with full rotation actively  Significant improvement throughout upper trap and ribs  Pt required cuing for correct form this visit  Tolerated treatment well  Patient demonstrated fatigue post treatment  Plan: Continue per plan of care        Precautions: anxiety      Manual             Upper trap STM DEVAUGHN Devaughn           First rib moblization DEVAUGHN DEVAUGHN           Thoracic  mobilization Alireza Balbuena                                         Exercise Diary             Chin tucks 10x2 breaths 10 x3 breaths           BL ER 2x10 OTB 2x10 OTB           t-band rows GTB 2x10 GTB 2x10            t-band extensions  OTB 2x10           Wall slides 2x10 2x8           t's             y's             Chin tuck with abduction  2x10 OTB                                                                                                                                                                           Modalities              Heat to start with breath 8'

## 2019-08-26 ENCOUNTER — APPOINTMENT (OUTPATIENT)
Dept: PHYSICAL THERAPY | Facility: REHABILITATION | Age: 44
End: 2019-08-26
Payer: COMMERCIAL

## 2019-09-16 ENCOUNTER — TELEPHONE (OUTPATIENT)
Dept: FAMILY MEDICINE CLINIC | Facility: CLINIC | Age: 44
End: 2019-09-16

## 2019-09-16 DIAGNOSIS — E78.1 HYPERTRIGLYCERIDEMIA: Primary | ICD-10-CM

## 2019-09-16 DIAGNOSIS — E53.8 VITAMIN B12 DEFICIENCY: ICD-10-CM

## 2019-09-16 DIAGNOSIS — E55.9 VITAMIN D DEFICIENCY: ICD-10-CM

## 2019-09-16 DIAGNOSIS — E11.9 TYPE 2 DIABETES MELLITUS WITHOUT COMPLICATION, WITHOUT LONG-TERM CURRENT USE OF INSULIN (HCC): ICD-10-CM

## 2019-09-16 NOTE — TELEPHONE ENCOUNTER
Patient has an appt 9/19 & wants to get blood work done before she comes in  Please call when order is in the system

## 2019-09-16 NOTE — TELEPHONE ENCOUNTER
Orders have been placed and printed  She will need to fast for 10-12 hours and drink 2 glasses of water in the morning prior to the blood work    Thank you

## 2019-09-19 ENCOUNTER — LAB (OUTPATIENT)
Dept: LAB | Facility: CLINIC | Age: 44
End: 2019-09-19
Payer: COMMERCIAL

## 2019-09-19 ENCOUNTER — OFFICE VISIT (OUTPATIENT)
Dept: FAMILY MEDICINE CLINIC | Facility: CLINIC | Age: 44
End: 2019-09-19
Payer: COMMERCIAL

## 2019-09-19 VITALS
BODY MASS INDEX: 27.87 KG/M2 | HEART RATE: 75 BPM | DIASTOLIC BLOOD PRESSURE: 78 MMHG | SYSTOLIC BLOOD PRESSURE: 124 MMHG | TEMPERATURE: 97 F | OXYGEN SATURATION: 98 % | HEIGHT: 66 IN | WEIGHT: 173.4 LBS | RESPIRATION RATE: 18 BRPM

## 2019-09-19 DIAGNOSIS — E53.8 VITAMIN B12 DEFICIENCY: ICD-10-CM

## 2019-09-19 DIAGNOSIS — E55.9 VITAMIN D DEFICIENCY: ICD-10-CM

## 2019-09-19 DIAGNOSIS — R51.9 NONINTRACTABLE EPISODIC HEADACHE, UNSPECIFIED HEADACHE TYPE: ICD-10-CM

## 2019-09-19 DIAGNOSIS — E11.9 TYPE 2 DIABETES MELLITUS WITHOUT COMPLICATION, WITHOUT LONG-TERM CURRENT USE OF INSULIN (HCC): Primary | ICD-10-CM

## 2019-09-19 DIAGNOSIS — E78.1 HYPERTRIGLYCERIDEMIA: ICD-10-CM

## 2019-09-19 DIAGNOSIS — E11.9 TYPE 2 DIABETES MELLITUS WITHOUT COMPLICATION, WITHOUT LONG-TERM CURRENT USE OF INSULIN (HCC): ICD-10-CM

## 2019-09-19 LAB
25(OH)D3 SERPL-MCNC: 8.6 NG/ML (ref 30–100)
ALBUMIN SERPL BCP-MCNC: 3.7 G/DL (ref 3.5–5)
ALP SERPL-CCNC: 59 U/L (ref 46–116)
ALT SERPL W P-5'-P-CCNC: 23 U/L (ref 12–78)
ANION GAP SERPL CALCULATED.3IONS-SCNC: 5 MMOL/L (ref 4–13)
AST SERPL W P-5'-P-CCNC: 11 U/L (ref 5–45)
BILIRUB SERPL-MCNC: 0.7 MG/DL (ref 0.2–1)
BUN SERPL-MCNC: 7 MG/DL (ref 5–25)
CALCIUM SERPL-MCNC: 8.7 MG/DL (ref 8.3–10.1)
CHLORIDE SERPL-SCNC: 105 MMOL/L (ref 100–108)
CHOLEST SERPL-MCNC: 159 MG/DL (ref 50–200)
CO2 SERPL-SCNC: 26 MMOL/L (ref 21–32)
CREAT SERPL-MCNC: 0.62 MG/DL (ref 0.6–1.3)
EST. AVERAGE GLUCOSE BLD GHB EST-MCNC: 114 MG/DL
GFR SERPL CREATININE-BSD FRML MDRD: 109 ML/MIN/1.73SQ M
GLUCOSE P FAST SERPL-MCNC: 93 MG/DL (ref 65–99)
HBA1C MFR BLD: 5.6 % (ref 4.2–6.3)
HDLC SERPL-MCNC: 36 MG/DL (ref 40–60)
LDLC SERPL DIRECT ASSAY-MCNC: 72 MG/DL (ref 0–100)
POTASSIUM SERPL-SCNC: 3.7 MMOL/L (ref 3.5–5.3)
PROT SERPL-MCNC: 7.2 G/DL (ref 6.4–8.2)
SODIUM SERPL-SCNC: 136 MMOL/L (ref 136–145)
TRIGL SERPL-MCNC: 458 MG/DL
VIT B12 SERPL-MCNC: 491 PG/ML (ref 100–900)

## 2019-09-19 PROCEDURE — 80053 COMPREHEN METABOLIC PANEL: CPT

## 2019-09-19 PROCEDURE — 82607 VITAMIN B-12: CPT

## 2019-09-19 PROCEDURE — 99214 OFFICE O/P EST MOD 30 MIN: CPT | Performed by: FAMILY MEDICINE

## 2019-09-19 PROCEDURE — 80061 LIPID PANEL: CPT

## 2019-09-19 PROCEDURE — 83721 ASSAY OF BLOOD LIPOPROTEIN: CPT

## 2019-09-19 PROCEDURE — 82306 VITAMIN D 25 HYDROXY: CPT

## 2019-09-19 PROCEDURE — 3008F BODY MASS INDEX DOCD: CPT | Performed by: FAMILY MEDICINE

## 2019-09-19 PROCEDURE — 36415 COLL VENOUS BLD VENIPUNCTURE: CPT

## 2019-09-19 PROCEDURE — 83036 HEMOGLOBIN GLYCOSYLATED A1C: CPT

## 2019-09-19 NOTE — PROGRESS NOTES
Patient has not returned to therapy for over 30 days or has missed more then 1 consecutive visit and will be discharged per facility attendance policy

## 2019-09-22 DIAGNOSIS — E11.9 TYPE 2 DIABETES MELLITUS WITHOUT COMPLICATION, WITHOUT LONG-TERM CURRENT USE OF INSULIN (HCC): ICD-10-CM

## 2019-09-23 ENCOUNTER — TELEPHONE (OUTPATIENT)
Dept: FAMILY MEDICINE CLINIC | Facility: CLINIC | Age: 44
End: 2019-09-23

## 2019-09-23 DIAGNOSIS — E11.9 TYPE 2 DIABETES MELLITUS WITHOUT COMPLICATION, WITHOUT LONG-TERM CURRENT USE OF INSULIN (HCC): ICD-10-CM

## 2019-09-23 DIAGNOSIS — E55.9 VITAMIN D DEFICIENCY: ICD-10-CM

## 2019-09-23 DIAGNOSIS — E78.1 HYPERTRIGLYCERIDEMIA: Primary | ICD-10-CM

## 2019-09-23 RX ORDER — ERGOCALCIFEROL 1.25 MG/1
50000 CAPSULE ORAL WEEKLY
Qty: 12 CAPSULE | Refills: 0 | Status: SHIPPED | OUTPATIENT
Start: 2019-09-23 | End: 2020-01-13

## 2019-09-23 NOTE — RESULT ENCOUNTER NOTE
Can you please let patient know that her blood work including fasting blood sugar, kidneys, liver was within normal range  Her vitamin B12 has improved  Her hemoglobin A1c is now within normal range, noted to be 5 6  I feel she would benefit from discontinuing the metformin, work on avoiding sweets, limiting carbs and recheck A1c in 3 months which I will print  In addition, her vitamin-D is very low  I will call in a prescription for her to take on a weekly basis for the next 3 months and will repeat this also in 3 months  In addition, her total cholesterol is stable at 159 however her triglycerides are elevated  I would like to keep a close eye on this and recheck again also in 3 months  I would like her to avoid bladder, saturated fats, red meats  Can you ask her if she is taking atorvastatin?   Thank you

## 2019-09-26 NOTE — TELEPHONE ENCOUNTER
Spoke with patient and gave her results of her blood work  She will have her Vit D, Cholesterol, and A1C rechecked in 3 months  She is not currently taking Atorvastatin and would like to know if you can send over a script for that to [facility] for her?   Please call to advise

## 2019-09-27 DIAGNOSIS — E78.5 HYPERLIPIDEMIA, UNSPECIFIED HYPERLIPIDEMIA TYPE: Primary | ICD-10-CM

## 2019-09-27 RX ORDER — ATORVASTATIN CALCIUM 20 MG/1
20 TABLET, FILM COATED ORAL
Qty: 90 TABLET | Refills: 0 | Status: SHIPPED | OUTPATIENT
Start: 2019-09-27 | End: 2019-10-03 | Stop reason: SDUPTHER

## 2019-10-02 ENCOUNTER — TELEPHONE (OUTPATIENT)
Dept: NEUROLOGY | Facility: CLINIC | Age: 44
End: 2019-10-02

## 2019-10-02 NOTE — PROGRESS NOTES
[facility]  PATIENT:  Tanika Danielson  MRN:  [id number]  :  1975  DATE OF SERVICE:  10/3/2019      Assessment/Plan:        Problem List Items Addressed This Visit        Cardiovascular and Mediastinum    Migraine without aura and without status migrainosus, not intractable    Relevant Medications    tiZANidine (ZANAFLEX) 2 mg tablet       Nervous and Auditory    Cervical radiculopathy - Primary    Relevant Medications    tiZANidine (ZANAFLEX) 2 mg tablet    Other Relevant Orders    MRI cervical spine wo contrast       Other    Vitamin D deficiency    Neck pain    Generalized anxiety disorder    Insomnia    Left shoulder pain    Myofascial pain    RESOLVED: Nonintractable headache         Low vitamin-D:  8 6-patient should continue taking 5000 international units on daily basis    low B12:  276 in 2019-patient should continue taking vitamin B12 500 mcg daily    Migraine headaches without aura  Preventive therapy for headaches:   - none needed at this time  Abortive therapy for headaches:   - at the onset of her migraine headache patients typically taking Tylenol on that does seem to help  I told patient that if that fails we could try indomethacin 50 mg with food  Work up:   - MRI cervical spine-has completed physical therapy twice now for her neck pain  The 1st time physical therapy did help the 2nd time it did not  Last time she went physical therapy was in 2019  Headache management instructions  - When patient has a moderate to severe headache, they should seek rest, initiate relaxation and apply cold compresses to the head  - Maintain regular sleep schedule  Adults need at least 7-8 hours of uninterrupted a night  - Limit over the counter medications such as Tylenol, Ibuprofen, Aleve, Excedrin  (No more than 2- 3 times a week or max 10 a month)  - Maintain headache diary    Free MEG for a smart phone, which can be used is "Migraine maria isabel"  - Limit caffeine to 1-2 cups 8 to 16 oz a day or less  - Avoid dietary trigger  (aged cheese, peanuts, MSG, aspartame and nitrates)  - Patient is to have regular frequent meals to prevent headache onset  - Please drink at least 64 ounces of water a day to help remain hydrated  Neck pain:   Preventive therapy: Will start patient on tizanidine 2 mg at bedtime daily  - Neck pathology and poor posture, with straightening of the normal cervical lordosis, can cause headaches  Tightening of the neck muscles can irritate the nerves in the occipital region of the head and cause or worsen head pain  Thus neck strengthening and relaxation exercises, can help improve this particular pain  It is importance to have good posture for improving shoulder, neck, and head pain  - Here are some exercises which should take 5 minutes:     1  Standing, drop your head to one side while continuing to look ahead  Hold for 10 seconds then swap sides  Repeat twice more each side  To increase the stretch, drop the opposite shoulder  2  Standing again, lower your chin to your chest, hold for 10 and then look up to the ceiling and hold for 10  Repeat twice more  3  Next, standing straight again, look over your right shoulder and hold firm for 10 seconds, then over your left shoulder for 10  Repeat this 3 times  4  Finally, while sitting upright, bring your head forward and hold for 10, then all the way back and hold for 10  If this simple exercise does not help improve the posture, we will consider formal physical therapy in the future  Importance of Healthy Sleep:  Behavioral sleep changes can promote restful, regular sleep and reduce headache  Simple changes like establishing consistent sleep and wake-up times, as well as getting between 7 and 8 hours of sleep a day, can make a world of difference   Experts also recommend avoiding substances that impair sleep, like caffeine, nicotine and alcohol, and also suggest winding down before bed to prevent sleep problems  To read more go to [email or web address]://[employer]  org/resource-library/sleep/    Exercising for migraineurs:  Regular exercise can reduce the frequency and intensity of headaches and migraines  When one exercises, the body releases endorphins, which are the bodys natural painkillers  Exercise reduces stress and helps individuals to sleep at night  Exercising at least 30 to 40 minutes 3 times a week is sufficient for most patients  When exercising, follow this plan to prevent headaches:  - First, stay hydrated before, during, and after exercise  - Second part of the exercise plan is to eat sufficient food about an hour and a half before you exercise  Exercise causes ones blood sugar level to decrease, and it is important to have a source of energy    - Final part of the exercise plan is to warm-up  Do not jump into sudden, vigorous exercise if that triggers a headache or migraine  To read more go to [email or web address]  org/resource-library/effects-of-exercise-on-headaches-and-migraines/     Please call with any questions or concerns  Office number is [phone]         History of Present Illness: We had the pleasure of evaluating Mariluz King in neurological consultation today for headaches  As you know,  she is a 38 y o  right handed  female  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  None  Any family history of aneurysms? none    Have you seen someone else for headaches or pain? no    Headaches started at what age? teens    What is your current pain level? 0/10    How often do the headaches occur? Once in a week or twice a week    Are you ever headache free? yes    Aura/Warning and how long does it last? none    What time of the day do the headaches start? Anytime of the day    How long do the headaches last? 2-4 hours to days    Describe your usual headache? Throbbing, pulsing, burning, shooting    Where is your headache located? Mostly left side of her head but can be throughout the head at time    What is the intensity of pain? 8 to 10/10    Associated symptoms:   - Nausea        - Photophobia     Phonophobia       - Insomnia  - Lacrimation       - Stiff or sore neck   - Dizziness   light headed  - Problems with concentration  - Blurred vision   - Prefer to be in a cool, quiet, dark room    Number of days missed per month because of headaches:  Work (or school) days:  none  Social or Family activities: not much but works through    Headache are worse if the patient:  exertion  Headache triggers:  Neck pain and stress, lack of sleep, missing meals, fatigue  What time of the year do headaches occur more frequently? do not seem to be related to any time of day or year    Have you had trigger point injection performed and how often? No  Have you had Botox injection performed and how often? No   Have you had epidural injections or transforaminal injections performed? No    Alternative therapies used in the past for headaches? Massage, physical therapy  Have you used CBD or THC for your headaches and how often? No  How many caffeine products to drink a day? In cup of tea in am  How much water to drink a day? One bottle a day if that    Are you current pregnant or planning on getting pregnant? No    What medications do you take or have you taken for your headaches?    Preventive therapy:   - vitamin-D  - Robaxin 3 times a day (patient was taking at bedtime), tizanidine 2 mg (2017),  - Ativan 1 mg  Abortive Therapy:   - Mobic 15 mg caused swelling does stopped, Toradol 15 mg x1, Motrin 200 mg,  - Tylenol 500 mg           Neck pain:  When did the neck pain start? Has been going on for a while  What is the intensity of your pain? 8/10  Where is the pain located? Both side of her neck, in the middle and then radiates down her left arm  Have you noticed decreased range of movement in your neck? Yes and hurts to move her neck left and right  Does your neck pain cause you to have headaches? yes  At what time of the day is your neck pain:  - Worst: in the morning  - Best: not often  Is your neck pain associated with: left arm feels weak, unable to sleep in the left side, has noted left arm myoclonus at time and this can occur 2-3 times a week  What aggravates neck pain? lifting, computer work, using phone, chin to chest, looking at ceiling, turning head to left or right  What alleviates neck pain? hot or cold packs, massage, exercise, stretching, electrical stimulation, resting in specific position    Sleep Habit:  Is your sleep restful? no    Do you wake up with headaches? yes    How many hours do you actually sleep? 5-6 hours  What time do you go to bed at night? 12 pm  What time do you wake up in am? 6 am  How often do you get up at night? Not often    Do you snore while asleep? sometime  Have you been told that you stop breathing during sleeping? no  Do you wake up tired in the morning? yes  Do you take frequent naps during the day? no  Do you have jaw pain? no  Do you grind/clench your teeth at night? no  Do you have restless leg syndrome? no  Do you have nightmare or sleep walk? no    Mood:   Depression: No  Anxiety: Yes    Have you ever had any Brain imaging? Yes    - Reviewed old notes from physician seen in the past  - Reviewed images on Portal   I personally reviewed these images  Recent laboratory data was reviewed  Medications and allergies were reviewed      04/04/2019 MRI brain with without contrast:  FINDINGS:   BRAIN PARENCHYMA:  There is no discrete mass, mass effect or midline shift  There is no intracranial hemorrhage  Normal posterior fossa  Diffusion imaging is unremarkable      There are no white matter changes in the cerebral hemispheres        Postcontrast imaging of the brain demonstrates no abnormal enhancement    VENTRICLES:  Normal    SELLA AND PITUITARY GLAND:  Normal    ORBITS:  Normal    PARANASAL SINUSES:  Normal    VASCULATURE:  Evaluation of the major intracranial vasculature demonstrates appropriate flow voids    CALVARIUM AND SKULL BASE:  Normal    EXTRACRANIAL SOFT TISSUES:  Normal    IMPRESSION:Normal examination  History reviewed  No pertinent past medical history      Patient Active Problem List   Diagnosis    Hypertriglyceridemia    Elevated blood sugar    Vitamin D deficiency    Neck pain    Generalized anxiety disorder    Insomnia    Fatigue    Routine health maintenance    Vitamin B12 deficiency    Left shoulder pain    Cervical radiculopathy    Myofascial pain    Migraine without aura and without status migrainosus, not intractable       Medications:      Current Outpatient Medications   Medication Sig Dispense Refill    acetaminophen (TYLENOL) 500 mg tablet Take 500 mg by mouth as needed        atorvastatin (LIPITOR) 20 mg tablet Take 1 tablet (20 mg total) by mouth daily at bedtime 90 tablet 0    Cetirizine HCl (ZYRTEC ALLERGY PO) Take 1 tablet by mouth as needed       cyanocobalamin (VITAMIN B-12) 1,000 mcg tablet Take 2,000 mcg by mouth daily      ergocalciferol (VITAMIN D2) 50,000 units Take 1 capsule (50,000 Units total) by mouth once a week 12 capsule 0    metFORMIN (GLUCOPHAGE) 500 mg tablet TAKE 1 TABLET EVERY DAY WITH A MEAL 30 tablet 5    meloxicam (MOBIC) 15 mg tablet Take 1 tablet (15 mg total) by mouth daily as needed for moderate pain (Patient not taking: Reported on 10/3/2019) 30 tablet 1    methocarbamol (ROBAXIN) 750 mg tablet Take 1 tablet (750 mg total) by mouth 3 (three) times a day as needed for muscle spasms (Patient not taking: Reported on 10/3/2019) 90 tablet 1    tiZANidine (ZANAFLEX) 2 mg tablet 1 at bedtime daily for neck pain 30 tablet 3     No current facility-administered medications for this visit  Allergies:       Allergies   Allergen Reactions    Dust Mite Extract Itching     Itchy, runny nose    Pollen Extract Itching     Itchy, runny nose       Family History:     Family History   Problem Relation Age of Onset    Diabetes Mother     Hypertension Mother     Heart attack Mother     Stroke Father     No Known Problems Daughter     No Known Problems Maternal Grandmother     No Known Problems Maternal Grandfather     No Known Problems Paternal Grandmother     No Known Problems Paternal Grandfather     No Known Problems Maternal Aunt     No Known Problems Maternal Aunt     No Known Problems Maternal Aunt     No Known Problems Paternal Aunt     No Known Problems Paternal Aunt     No Known Problems Paternal Aunt     No Known Problems Paternal Aunt     No Known Problems Paternal Aunt        Social History:     Social History     Socioeconomic History    Marital status: /Civil Union     Spouse name: Not on file    Number of children: Not on file    Years of education: Not on file    Highest education level: Not on file   Occupational History    Not on file   Social Needs    Financial resource strain: Not on file    Food insecurity:     Worry: Not on file     Inability: Not on file    Transportation needs:     Medical: Not on file     Non-medical: Not on file   Tobacco Use    Smoking status: Never Smoker    Smokeless tobacco: Never Used   Substance and Sexual Activity    Alcohol use: No    Drug use: No    Sexual activity: Not on file   Lifestyle    Physical activity:     Days per week: Not on file     Minutes per session: Not on file    Stress: Not on file   Relationships    Social connections:     Talks on phone: Not on file     Gets together: Not on file     Attends Tenriism service: Not on file     Active member of club or organization: Not on file     Attends meetings of clubs or organizations: Not on file     Relationship status: Not on file    Intimate partner violence:     Fear of current or ex partner: Not on file     Emotionally abused: Not on file     Physically abused: Not on file     Forced sexual activity: Not on file   Other Topics Concern    Not on file   Social History Narrative    Caffeine use    Description: Tea 1 cup         Objective:   Physical Exam:                                                                   Vitals:               /60 (BP Location: Left arm, Patient Position: Sitting, Cuff Size: Standard)   Pulse 72   Ht 5' 6" (1 676 m)   Wt 79 2 kg (174 lb 8 oz)   BMI 28 17 kg/m²   BP Readings from Last 3 Encounters:   10/03/19 100/60   09/19/19 124/78   05/06/19 105/74     Pulse Readings from Last 3 Encounters:   10/03/19 72   09/19/19 75   05/06/19 78                CONSTITUTIONAL: Well developed, well nourished, well groomed  No dysmorphic features  Eyes:  PERRLA, EOM normal      Neck:   neck pain with looking to the left right, decreased range of motion when looking to the right      HEENT:  Normocephalic atraumatic  No meningismus  Oropharynx is clear and moist  No oral mucosal lesions  Chest:  Respirations regular and unlabored  Cardiovascular:  Distal extremities warm without palpable edema or tenderness, no observed significant swelling  Musculoskeletal:  Full range of motion  Skin:  warm and dry   Psychiatric:  Normal behavior and appropriate affect        Neurological Examination:   Mental status/cognitive function: Orientated to time, place and person  Cranial Nerves: 2 to 12 intact  Motor Exam:    5/5 upper extremity  5/5 lower extremity  Sensory: grossly intact light touch in all extremities     Reflexes:   2 +/4 upper extremity  2 +/4 lower extremity  No clonus noted  Coordination: Finger to nose intact bilaterally, no tremor noted  Gait: steady casual  gait, mild to moderate difficulty with tandem gait, positive Romberg's falling towards the right         Review of Systems:   Review of Systems   Constitutional: Negative  HENT: Positive for tinnitus  Eyes: Positive for visual disturbance (Blurred Vision )  Respiratory: Negative  Cardiovascular: Positive for palpitations  Gastrointestinal: Positive for nausea  Endocrine: Negative  Genitourinary: Negative  Musculoskeletal: Positive for neck pain and neck stiffness  Joint Pain    Skin: Negative  Allergic/Immunologic: Negative  Neurological: Positive for dizziness, weakness, light-headedness and headaches  Hematological: Negative  Psychiatric/Behavioral: Positive for decreased concentration and sleep disturbance  The patient is nervous/anxious  I personally reviewed and updated the ROS that was entered by the medical assistant       I have spent 60 minutes with Patient  today in which greater than 50% of this time was spent in counseling/coordination of care regarding Diagnostic results, Prognosis, Risks and benefits of tx options, Intructions for management, Patient and family education, Importance of tx compliance, Risk factor reductions, Impressions and plan of care as above        Author:  Eric Ugarte MD 10/3/2019 9:12 AM

## 2019-10-03 ENCOUNTER — CONSULT (OUTPATIENT)
Dept: NEUROLOGY | Facility: CLINIC | Age: 44
End: 2019-10-03
Payer: COMMERCIAL

## 2019-10-03 ENCOUNTER — TELEPHONE (OUTPATIENT)
Dept: FAMILY MEDICINE CLINIC | Facility: CLINIC | Age: 44
End: 2019-10-03

## 2019-10-03 VITALS
HEIGHT: 66 IN | WEIGHT: 174.5 LBS | DIASTOLIC BLOOD PRESSURE: 60 MMHG | SYSTOLIC BLOOD PRESSURE: 100 MMHG | HEART RATE: 72 BPM | BODY MASS INDEX: 28.04 KG/M2

## 2019-10-03 DIAGNOSIS — F51.01 PRIMARY INSOMNIA: ICD-10-CM

## 2019-10-03 DIAGNOSIS — M54.2 NECK PAIN: ICD-10-CM

## 2019-10-03 DIAGNOSIS — M79.18 MYOFASCIAL PAIN: ICD-10-CM

## 2019-10-03 DIAGNOSIS — E78.5 HYPERLIPIDEMIA, UNSPECIFIED HYPERLIPIDEMIA TYPE: ICD-10-CM

## 2019-10-03 DIAGNOSIS — M54.12 CERVICAL RADICULOPATHY: Primary | ICD-10-CM

## 2019-10-03 DIAGNOSIS — G89.29 CHRONIC LEFT SHOULDER PAIN: ICD-10-CM

## 2019-10-03 DIAGNOSIS — F41.1 GENERALIZED ANXIETY DISORDER: ICD-10-CM

## 2019-10-03 DIAGNOSIS — R51.9 NONINTRACTABLE EPISODIC HEADACHE, UNSPECIFIED HEADACHE TYPE: ICD-10-CM

## 2019-10-03 DIAGNOSIS — M25.512 CHRONIC LEFT SHOULDER PAIN: ICD-10-CM

## 2019-10-03 DIAGNOSIS — G43.009 MIGRAINE WITHOUT AURA AND WITHOUT STATUS MIGRAINOSUS, NOT INTRACTABLE: ICD-10-CM

## 2019-10-03 DIAGNOSIS — E55.9 VITAMIN D DEFICIENCY: ICD-10-CM

## 2019-10-03 PROBLEM — R73.01 IMPAIRED FASTING GLUCOSE: Status: RESOLVED | Noted: 2018-02-23 | Resolved: 2019-10-03

## 2019-10-03 PROCEDURE — 99245 OFF/OP CONSLTJ NEW/EST HI 55: CPT | Performed by: PSYCHIATRY & NEUROLOGY

## 2019-10-03 RX ORDER — TIZANIDINE 2 MG/1
TABLET ORAL
Qty: 30 TABLET | Refills: 3 | Status: SHIPPED | OUTPATIENT
Start: 2019-10-03 | End: 2020-01-03

## 2019-10-03 RX ORDER — ATORVASTATIN CALCIUM 20 MG/1
20 TABLET, FILM COATED ORAL
Qty: 90 TABLET | Refills: 3 | Status: SHIPPED | OUTPATIENT
Start: 2019-10-03 | End: 2020-10-02

## 2019-10-03 RX ORDER — INDOMETHACIN 50 MG/1
CAPSULE ORAL
Qty: 10 CAPSULE | Refills: 0 | Status: SHIPPED | OUTPATIENT
Start: 2019-10-03 | End: 2020-01-13

## 2019-10-03 NOTE — TELEPHONE ENCOUNTER
NITA,  Patient called stating she has continued to take her Metformin even though you told her to stop taking it  She is stating she started due to headaches from her bloodsugar being high  If there are any concerns patient can be reached at [phone]

## 2019-10-07 ENCOUNTER — CLINICAL SUPPORT (OUTPATIENT)
Dept: PAIN MEDICINE | Facility: CLINIC | Age: 44
End: 2019-10-07
Payer: COMMERCIAL

## 2019-10-07 VITALS
BODY MASS INDEX: 27.97 KG/M2 | TEMPERATURE: 97.8 F | SYSTOLIC BLOOD PRESSURE: 97 MMHG | DIASTOLIC BLOOD PRESSURE: 63 MMHG | WEIGHT: 174 LBS | HEIGHT: 66 IN | HEART RATE: 73 BPM

## 2019-10-07 DIAGNOSIS — M50.30 DDD (DEGENERATIVE DISC DISEASE), CERVICAL: ICD-10-CM

## 2019-10-07 DIAGNOSIS — M47.812 SPONDYLOSIS OF CERVICAL SPINE WITHOUT MYELOPATHY: ICD-10-CM

## 2019-10-07 DIAGNOSIS — M54.2 NECK PAIN: ICD-10-CM

## 2019-10-07 DIAGNOSIS — M54.12 CERVICAL RADICULOPATHY: Primary | ICD-10-CM

## 2019-10-07 DIAGNOSIS — M79.18 MYOFASCIAL PAIN: ICD-10-CM

## 2019-10-07 PROCEDURE — 99214 OFFICE O/P EST MOD 30 MIN: CPT | Performed by: ANESTHESIOLOGY

## 2019-10-07 RX ORDER — GABAPENTIN 100 MG/1
300 CAPSULE ORAL
Qty: 90 CAPSULE | Refills: 1 | Status: SHIPPED | OUTPATIENT
Start: 2019-10-07 | End: 2020-10-02

## 2019-10-07 NOTE — PROGRESS NOTES
Assessment  1  Cervical radiculopathy    2  Myofascial pain    3  Neck pain    4  DDD (degenerative disc disease), cervical    5  Spondylosis of cervical spine without myelopathy        Plan  40-year-old female returning for follow-up of neck pain with radiculopathy into the left upper extremity  The patient does have cervical degenerative disc disease and spondylosis at C4-5 and C5-6 with spondylitic foraminal stenosis at these levels  The patient also has a history of chronic migraines for which she sees neurology  The patient has done physical therapy without any significant relief in her neck and upper extremity symptoms  An MRI of the cervical spine was ordered by neurology and is scheduled for October 18, 2019  The patient has tried meloxicam and methocarbamol  The patient did find some relief with these medications, however she had noticed a significant amount of swelling in her hands and face and was not sure which medication was causing this, therefore she discontinued both of these medications  She was prescribed a trial of tizanidine by Neurology, however she has not yet started this  1  We will await MRI of cervical spine to determine if interventional therapy is indicated  2  I will initiate gabapentin 100 mg q h s  And titrate up to 300 mg q h s  For neuropathic complaints  The patient was apprised most common side effects of gabapentin and she was given a titration schedule at today's visit  3  The patient may continue with tizanidine as ordered by Neurology  4  The patient will continue with her home exercise program  5  I will follow up the patient in 8 weeks and we will call her with results of MRI once received and our recommendations based upon those results        My impressions and treatment recommendations were discussed in detail with the patient who verbalized understanding and had no further questions  Discharge instructions were provided   I personally saw and examined the patient and I agree with the above discussed plan of care  No orders of the defined types were placed in this encounter  No orders of the defined types were placed in this encounter  History of Present Illness    Eloise Bridges is a 38 y o  female returning for follow-up of neck pain with radiculopathy into the left upper extremity  The patient does have cervical degenerative disc disease and spondylosis at C4-5 and C5-6 with spondylitic foraminal stenosis at these levels  The patient also has a history of chronic migraines for which she sees neurology  She denies any right upper extremity symptoms, bladder bowel incontinence, or balance issues  The patient has done physical therapy without any significant relief in her neck and upper extremity symptoms  An MRI of the cervical spine was ordered by neurology and is scheduled for October 18, 2019  The patient has tried meloxicam and methocarbamol  The patient did find some relief with these medications, however she had noticed a significant amount of swelling in her hands and face and was not sure which medication was causing this, therefore she discontinued both of these medications  She was prescribed a trial of tizanidine by Neurology, however she has not yet started this  She rates her pain a 10/10 and the pain is worse in the morning  The pain is constant and described as burning, pressure-like, and pins and needles  The pain is worse with exercise and bending her neck  The pain is alleviated with relaxation and lying down  I have personally reviewed and/or updated the patient's past medical history, past surgical history, family history, social history, current medications, allergies, and vital signs today  Other than as stated above, the patient denies any interval changes in medications, medical condition, mental condition, symptoms, or allergies since the last office visit          Review of Systems   Constitutional: Negative for fever and unexpected weight change  HENT: Negative for trouble swallowing  Eyes: Negative for visual disturbance  Respiratory: Negative for shortness of breath and wheezing  Cardiovascular: Positive for chest pain  Negative for palpitations  Gastrointestinal: Positive for nausea  Negative for constipation, diarrhea and vomiting  Endocrine: Negative for cold intolerance, heat intolerance and polydipsia  Genitourinary: Negative for difficulty urinating and frequency  Musculoskeletal: Positive for joint swelling  Negative for arthralgias, gait problem and myalgias  Decreased ROM, swelling   Skin: Negative for rash  Neurological: Positive for dizziness and weakness  Negative for seizures, syncope and headaches  Hematological: Does not bruise/bleed easily  Psychiatric/Behavioral: Negative for dysphoric mood  All other systems reviewed and are negative  Patient Active Problem List   Diagnosis    Hypertriglyceridemia    Elevated blood sugar    Vitamin D deficiency    Neck pain    Generalized anxiety disorder    Insomnia    Fatigue    Routine health maintenance    Vitamin B12 deficiency    Left shoulder pain    Cervical radiculopathy    Myofascial pain    Migraine without aura and without status migrainosus, not intractable       No past medical history on file  Past Surgical History:   Procedure Laterality Date     SECTION      CHOLECYSTECTOMY      TOTAL ABDOMINAL HYSTERECTOMY      Due to fibroids   Ovaries present       Family History   Problem Relation Age of Onset    Diabetes Mother     Hypertension Mother     Heart attack Mother     Stroke Father     No Known Problems Daughter     No Known Problems Maternal Grandmother     No Known Problems Maternal Grandfather     No Known Problems Paternal Grandmother     No Known Problems Paternal Grandfather     No Known Problems Maternal Aunt     No Known Problems Maternal Aunt     No Known Problems Maternal Aunt     No Known Problems Paternal Aunt     No Known Problems Paternal Aunt     No Known Problems Paternal Aunt     No Known Problems Paternal Aunt     No Known Problems Paternal Aunt        Social History     Occupational History    Not on file   Tobacco Use    Smoking status: Never Smoker    Smokeless tobacco: Never Used   Substance and Sexual Activity    Alcohol use: No    Drug use: No    Sexual activity: Not on file       Current Outpatient Medications on File Prior to Visit   Medication Sig    acetaminophen (TYLENOL) 500 mg tablet Take 500 mg by mouth as needed      atorvastatin (LIPITOR) 20 mg tablet Take 1 tablet (20 mg total) by mouth daily at bedtime    Cetirizine HCl (ZYRTEC ALLERGY PO) Take 1 tablet by mouth as needed     cyanocobalamin (VITAMIN B-12) 1,000 mcg tablet Take 2,000 mcg by mouth daily    ergocalciferol (VITAMIN D2) 50,000 units Take 1 capsule (50,000 Units total) by mouth once a week    indomethacin (INDOCIN) 50 mg capsule At the onset of her moderate to severe headache with food only    meloxicam (MOBIC) 15 mg tablet Take 1 tablet (15 mg total) by mouth daily as needed for moderate pain (Patient not taking: Reported on 10/3/2019)    metFORMIN (GLUCOPHAGE) 500 mg tablet TAKE 1 TABLET EVERY DAY WITH A MEAL    methocarbamol (ROBAXIN) 750 mg tablet Take 1 tablet (750 mg total) by mouth 3 (three) times a day as needed for muscle spasms (Patient not taking: Reported on 10/3/2019)    tiZANidine (ZANAFLEX) 2 mg tablet 1 at bedtime daily for neck pain     No current facility-administered medications on file prior to visit          Allergies   Allergen Reactions    Dust Mite Extract Itching     Itchy, runny nose    Pollen Extract Itching     Itchy, runny nose       Physical Exam    BP 97/63   Pulse 73   Temp 97 8 °F (36 6 °C) (Oral)   Ht 5' 6" (1 676 m)   Wt 78 9 kg (174 lb)   BMI 28 08 kg/m²     Constitutional: normal, well developed, well nourished, alert, in no distress and non-toxic and no overt pain behavior  Eyes: anicteric  HEENT: grossly intact  Neck: supple, symmetric, trachea midline and no masses   Pulmonary:even and unlabored  Cardiovascular:No edema or pitting edema present  Skin:Normal without rashes or lesions and well hydrated  Psychiatric:Mood and affect appropriate  Neurologic:Cranial Nerves II-XII grossly intact  Musculoskeletal:normal gait  Bilateral cervical paraspinals and trapezii tender to palpation more so on the left than the right  Full range of motion of cervical spine in all planes  Bilateral upper extremity strength 5/5 in all muscle groups  Sensation intact to light touch in C5 through T1 dermatomes bilaterally  Positive Spurling's to the left and negative to the right      Imaging  Imaging reviewed

## 2019-10-18 ENCOUNTER — HOSPITAL ENCOUNTER (OUTPATIENT)
Dept: RADIOLOGY | Facility: HOSPITAL | Age: 44
Discharge: HOME | End: 2019-10-18
Attending: PSYCHIATRY & NEUROLOGY
Payer: COMMERCIAL

## 2019-10-18 DIAGNOSIS — M54.12 CERVICAL RADICULOPATHY: ICD-10-CM

## 2019-10-18 PROCEDURE — 72141 MRI NECK SPINE W/O DYE: CPT

## 2019-10-21 ENCOUNTER — APPOINTMENT (OUTPATIENT)
Dept: LAB | Facility: CLINIC | Age: 44
End: 2019-10-21

## 2019-10-21 ENCOUNTER — OCCMED (OUTPATIENT)
Dept: URGENT CARE | Facility: CLINIC | Age: 44
End: 2019-10-21

## 2019-10-21 ENCOUNTER — TRANSCRIBE ORDERS (OUTPATIENT)
Dept: ADMINISTRATIVE | Facility: HOSPITAL | Age: 44
End: 2019-10-21

## 2019-10-21 DIAGNOSIS — Z13.9 SCREENING FOR UNSPECIFIED CONDITION: ICD-10-CM

## 2019-10-21 DIAGNOSIS — Z00.8 ENCOUNTER FOR BIOMETRIC SCREENING: Primary | ICD-10-CM

## 2019-10-21 DIAGNOSIS — Z13.9 SCREENING FOR UNSPECIFIED CONDITION: Primary | ICD-10-CM

## 2019-10-21 LAB
CHOLEST SERPL-MCNC: 121 MG/DL (ref 50–200)
GLUCOSE P FAST SERPL-MCNC: 104 MG/DL (ref 65–99)
HDLC SERPL-MCNC: 41 MG/DL
LDLC SERPL CALC-MCNC: 42 MG/DL (ref 0–100)
NONHDLC SERPL-MCNC: 80 MG/DL
TRIGL SERPL-MCNC: 188 MG/DL

## 2019-10-21 PROCEDURE — 80061 LIPID PANEL: CPT

## 2019-10-21 PROCEDURE — 80323 ALKALOIDS NOS: CPT

## 2019-10-21 PROCEDURE — 82947 ASSAY GLUCOSE BLOOD QUANT: CPT

## 2019-10-21 PROCEDURE — 36415 COLL VENOUS BLD VENIPUNCTURE: CPT

## 2019-10-25 LAB
COTININE SERPL-MCNC: NORMAL NG/ML
NICOTINE SERPL-MCNC: NORMAL NG/ML

## 2019-10-30 DIAGNOSIS — M54.12 CERVICAL RADICULOPATHY: ICD-10-CM

## 2019-10-30 RX ORDER — GABAPENTIN 100 MG/1
300 CAPSULE ORAL
Qty: 90 CAPSULE | Refills: 1 | OUTPATIENT
Start: 2019-10-30

## 2019-11-15 ENCOUNTER — TELEPHONE (OUTPATIENT)
Dept: NEUROLOGY | Facility: CLINIC | Age: 44
End: 2019-11-15

## 2019-11-15 NOTE — TELEPHONE ENCOUNTER
Pt calling in to request results of MRI  She is also asking if MRI is abnormal, should any new mediations be prescribed? Please advise       [phone] to leave a detailed message

## 2019-11-15 NOTE — TELEPHONE ENCOUNTER
For now have patient continue tizanidine  And document her headache/neck pain intensity and frequency  Will discuss further management upon follow-up    Thank you

## 2019-12-09 ENCOUNTER — TELEPHONE (OUTPATIENT)
Dept: FAMILY MEDICINE CLINIC | Facility: CLINIC | Age: 44
End: 2019-12-09

## 2019-12-09 NOTE — TELEPHONE ENCOUNTER
Patient wants to know if Dr Conchita Tamez can put an order in for lab work for her to do tomorrow morning before her appointment  Please call to advise

## 2019-12-09 NOTE — TELEPHONE ENCOUNTER
I had ordered given her blood work to do for 12/23  Insurance will not cover it sooner than that  If she wants to reschedule her appointment and come in after she gets her blood work that is okay with me  All blood work orders are in the system as well

## 2019-12-13 DIAGNOSIS — E55.9 VITAMIN D DEFICIENCY: ICD-10-CM

## 2019-12-13 RX ORDER — ERGOCALCIFEROL 1.25 MG/1
CAPSULE ORAL
Qty: 12 CAPSULE | Refills: 0 | OUTPATIENT
Start: 2019-12-13

## 2019-12-13 NOTE — TELEPHONE ENCOUNTER
Patient is due for blood work  Will recheck blood test in see where she is and then determine how much she needs to take

## 2020-01-03 DIAGNOSIS — M54.12 CERVICAL RADICULOPATHY: ICD-10-CM

## 2020-01-03 RX ORDER — TIZANIDINE 2 MG/1
TABLET ORAL
Qty: 90 TABLET | Refills: 0 | Status: SHIPPED | OUTPATIENT
Start: 2020-01-03 | End: 2020-04-03

## 2020-01-06 ENCOUNTER — APPOINTMENT (EMERGENCY)
Dept: RADIOLOGY | Facility: HOSPITAL | Age: 45
End: 2020-01-06
Payer: OTHER MISCELLANEOUS

## 2020-01-06 ENCOUNTER — HOSPITAL ENCOUNTER (EMERGENCY)
Facility: HOSPITAL | Age: 45
Discharge: HOME | End: 2020-01-06
Attending: EMERGENCY MEDICINE | Admitting: EMERGENCY MEDICINE
Payer: OTHER MISCELLANEOUS

## 2020-01-06 VITALS
RESPIRATION RATE: 16 BRPM | HEART RATE: 85 BPM | TEMPERATURE: 97.6 F | OXYGEN SATURATION: 96 % | SYSTOLIC BLOOD PRESSURE: 109 MMHG | DIASTOLIC BLOOD PRESSURE: 83 MMHG

## 2020-01-06 DIAGNOSIS — M25.562 LEFT KNEE PAIN: Primary | ICD-10-CM

## 2020-01-06 PROCEDURE — 99283 EMERGENCY DEPT VISIT LOW MDM: CPT

## 2020-01-06 PROCEDURE — 96372 THER/PROPH/DIAG INJ SC/IM: CPT

## 2020-01-06 PROCEDURE — 73564 X-RAY EXAM KNEE 4 OR MORE: CPT

## 2020-01-06 PROCEDURE — 99284 EMERGENCY DEPT VISIT MOD MDM: CPT | Performed by: EMERGENCY MEDICINE

## 2020-01-06 RX ORDER — KETOROLAC TROMETHAMINE 30 MG/ML
15 INJECTION, SOLUTION INTRAMUSCULAR; INTRAVENOUS ONCE
Status: COMPLETED | OUTPATIENT
Start: 2020-01-06 | End: 2020-01-06

## 2020-01-06 RX ORDER — IBUPROFEN 600 MG/1
600 TABLET ORAL EVERY 6 HOURS PRN
Qty: 30 TABLET | Refills: 0 | Status: SHIPPED | OUTPATIENT
Start: 2020-01-06 | End: 2020-04-30 | Stop reason: ALTCHOICE

## 2020-01-06 RX ADMIN — KETOROLAC TROMETHAMINE 15 MG: 30 INJECTION, SOLUTION INTRAMUSCULAR at 22:28

## 2020-01-07 NOTE — ED ATTENDING ATTESTATION
1/6/2020  Nguyen Mckay DO, saw and evaluated the patient  I have discussed the patient with the resident/non-physician practitioner and agree with the resident's/non-physician practitioner's findings, Plan of Care, and MDM as documented in the resident's/non-physician practitioner's note, except where noted  All available labs and Radiology studies were reviewed  I was present for key portions of any procedure(s) performed by the resident/non-physician practitioner and I was immediately available to provide assistance  At this point I agree with the current assessment done in the Emergency Department  I have conducted an independent evaluation of this patient a history and physical is as follows:    70-year-old female presents emergency department with 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

## 2020-01-13 ENCOUNTER — OFFICE VISIT (OUTPATIENT)
Dept: FAMILY MEDICINE CLINIC | Facility: CLINIC | Age: 45
End: 2020-01-13
Payer: COMMERCIAL

## 2020-01-13 VITALS
BODY MASS INDEX: 28 KG/M2 | DIASTOLIC BLOOD PRESSURE: 60 MMHG | OXYGEN SATURATION: 98 % | WEIGHT: 174.25 LBS | TEMPERATURE: 96.7 F | HEIGHT: 66 IN | RESPIRATION RATE: 18 BRPM | SYSTOLIC BLOOD PRESSURE: 110 MMHG | HEART RATE: 72 BPM

## 2020-01-13 DIAGNOSIS — E53.8 VITAMIN B12 DEFICIENCY: ICD-10-CM

## 2020-01-13 DIAGNOSIS — R51.9 NONINTRACTABLE EPISODIC HEADACHE, UNSPECIFIED HEADACHE TYPE: ICD-10-CM

## 2020-01-13 DIAGNOSIS — E78.1 HYPERTRIGLYCERIDEMIA: Primary | ICD-10-CM

## 2020-01-13 DIAGNOSIS — E11.9 TYPE 2 DIABETES MELLITUS WITHOUT COMPLICATION, WITHOUT LONG-TERM CURRENT USE OF INSULIN (HCC): ICD-10-CM

## 2020-01-13 DIAGNOSIS — E55.9 VITAMIN D DEFICIENCY: ICD-10-CM

## 2020-01-13 PROCEDURE — 99214 OFFICE O/P EST MOD 30 MIN: CPT | Performed by: FAMILY MEDICINE

## 2020-01-13 PROCEDURE — 3008F BODY MASS INDEX DOCD: CPT | Performed by: FAMILY MEDICINE

## 2020-01-13 RX ORDER — CYCLOBENZAPRINE HCL 10 MG
10 TABLET ORAL DAILY PRN
COMMUNITY
Start: 2020-01-10 | End: 2020-01-13

## 2020-01-13 NOTE — PROGRESS NOTES
FAMILY PRACTICE OFFICE VISIT       NAME: Severino Virk  AGE: 54 y o  SEX: female       : 1975        MRN: [id number]    DATE: 2020  TIME: 10:38 PM    Assessment and Plan     Problem List Items Addressed This Visit        Other    Hypertriglyceridemia - Primary    Vitamin D deficiency    Vitamin B12 deficiency      Other Visit Diagnoses     Type 2 diabetes mellitus without complication, without long-term current use of insulin ([address] )        Nonintractable episodic headache, unspecified headache type            Hypertriglyceridemia:  Patient continues to take atorvastatin  Have advised on working on lifestyle modifications by increasing heart healthy fats in the diet by incorporating Mediterranean diet, starting routine exercise regimen  Recommend starting Co Q10 100 mg twice daily as well  Diabetes:  Last A1c was noted to be 5 6  Patient discontinued metformin  I will go ahead and recheck this again  Continue with lifestyle modifications  Up-to-date with foot exam     Headaches:  Is week followed by Neurology  Patient also does have cervical radiculopathy  She continues to take gabapentin which does help  She has also been taking side edema  Vitamin-D deficiency:  Will recheck vitamin-D level  Vitamin B12 deficiency:  Patient continues to take supplementation  Routine health maintenance:  Patient will schedule appoint with GI to discuss initial screening colonoscopy  Up-to-date with mammogram   Up-to-date with Tdap  There are no Patient Instructions on file for this visit  Chief Complaint     Chief Complaint   Patient presents with    Follow-up       History of Present Illness     HPI   25-year-old female here for routine follow-up  Stopped metformin as a1c was 5 6  Dr Beatris Vilchis prescribed tizanidine daily for cervical pain  Takes Gabapentin at bedtime  She will start exercising  Review of Systems   Review of Systems   Constitutional: Negative for appetite change  Eyes: Negative for visual disturbance  Respiratory: Negative for shortness of breath  Cardiovascular: Negative  Gastrointestinal: Negative for abdominal pain and constipation  Genitourinary: Negative for dysuria  Neurological: Negative for dizziness and light-headedness  Active Problem List     Patient Active Problem List   Diagnosis    Hypertriglyceridemia    Elevated blood sugar    Vitamin D deficiency    Neck pain    Generalized anxiety disorder    Insomnia    Fatigue    Routine health maintenance    Vitamin B12 deficiency    Left shoulder pain    Cervical radiculopathy    Myofascial pain    Migraine without aura and without status migrainosus, not intractable    DDD (degenerative disc disease), cervical    Spondylosis of cervical spine without myelopathy       Past Medical History:  History reviewed  No pertinent past medical history  Past Surgical History:  Past Surgical History:   Procedure Laterality Date     SECTION      CHOLECYSTECTOMY      TOTAL ABDOMINAL HYSTERECTOMY      Due to fibroids   Ovaries present       Family History:  Family History   Problem Relation Age of Onset    Diabetes Mother     Hypertension Mother     Heart attack Mother     Stroke Father     No Known Problems Daughter     No Known Problems Maternal Grandmother     No Known Problems Maternal Grandfather     No Known Problems Paternal Grandmother     No Known Problems Paternal Grandfather     No Known Problems Maternal Aunt     No Known Problems Maternal Aunt     No Known Problems Maternal Aunt     No Known Problems Paternal Aunt     No Known Problems Paternal Aunt     No Known Problems Paternal Aunt     No Known Problems Paternal Aunt     No Known Problems Paternal Aunt        Social History:  Social History     Socioeconomic History    Marital status: /Civil Union     Spouse name: Not on file    Number of children: Not on file    Years of education: Not on file  Highest education level: Not on file   Occupational History    Not on file   Social Needs    Financial resource strain: Not on file    Food insecurity:     Worry: Not on file     Inability: Not on file    Transportation needs:     Medical: Not on file     Non-medical: Not on file   Tobacco Use    Smoking status: Never Smoker    Smokeless tobacco: Never Used   Substance and Sexual Activity    Alcohol use: No    Drug use: No    Sexual activity: Not on file   Lifestyle    Physical activity:     Days per week: Not on file     Minutes per session: Not on file    Stress: Not on file   Relationships    Social connections:     Talks on phone: Not on file     Gets together: Not on file     Attends L[employer]-day service: Not on file     Active member of club or organization: Not on file     Attends meetings of clubs or organizations: Not on file     Relationship status: Not on file    Intimate partner violence:     Fear of current or ex partner: Not on file     Emotionally abused: Not on file     Physically abused: Not on file     Forced sexual activity: Not on file   Other Topics Concern    Not on file   Social History Narrative    Caffeine use    Description: Tea 1 cup     I have reviewed the patient's medical history in detail; there are no changes to the history as noted in the electronic medical record  Objective     Vitals:    01/13/20 1147   BP: 110/60   Pulse: 72   Resp: 18   Temp: (!) 96 7 °F (35 9 °C)   SpO2: 98%     Wt Readings from Last 3 Encounters:   01/13/20 79 kg (174 lb 4 oz)   10/07/19 78 9 kg (174 lb)   10/18/19 79 8 kg (176 lb)       Physical Exam   Constitutional: She appears well-developed and well-nourished  HENT:   Head: Normocephalic and atraumatic  Mouth/Throat: Oropharynx is clear and moist    Eyes: Pupils are equal, round, and reactive to light  Conjunctivae and EOM are normal    Neck: Normal range of motion  Neck supple  No thyromegaly present     Cardiovascular: Normal rate, regular rhythm and normal heart sounds  Pulmonary/Chest: Effort normal and breath sounds normal    Abdominal: Soft  Bowel sounds are normal  She exhibits no distension  There is no tenderness  Musculoskeletal: Normal range of motion  She exhibits no edema  Lymphadenopathy:     She has no cervical adenopathy  Neurological: She is alert  Psychiatric: She has a normal mood and affect  Nursing note and vitals reviewed  Pertinent Laboratory/Diagnostic Studies:  Lab Results   Component Value Date    BUN 7 09/19/2019    CREATININE 0 62 09/19/2019    CALCIUM 8 7 09/19/2019    K 3 7 09/19/2019    CO2 26 09/19/2019     09/19/2019     Lab Results   Component Value Date    ALT 23 09/19/2019    AST 11 09/19/2019    ALKPHOS 59 09/19/2019       Lab Results   Component Value Date    WBC 9 26 03/22/2019    HGB 13 3 03/22/2019    HCT 38 6 03/22/2019    MCV 86 03/22/2019     03/22/2019       No results found for: TSH    No results found for: CHOL  Lab Results   Component Value Date    TRIG 188 (H) 10/21/2019     Lab Results   Component Value Date    HDL 41 10/21/2019     Lab Results   Component Value Date    LDLCALC 42 10/21/2019     Lab Results   Component Value Date    HGBA1C 5 6 09/19/2019       Results for orders placed or performed in visit on 10/21/19   Nicotine, Serum   Result Value Ref Range    Nicotine None Detected ng/mL    Cotinine None Detected ng/mL   Lipid panel   Result Value Ref Range    Cholesterol 121 50 - 200 mg/dL    Triglycerides 188 (H) <=150 mg/dL    HDL, Direct 41 >=40 mg/dL    LDL Calculated 42 0 - 100 mg/dL    Non-HDL-Chol (CHOL-HDL) 80 mg/dl   Glucose, fasting   Result Value Ref Range    Glucose, Fasting 104 (H) 65 - 99 mg/dL       No orders of the defined types were placed in this encounter        ALLERGIES:  Allergies   Allergen Reactions    Bee Pollen Itching     Itchy, runny nose    Dust Mite Extract Itching     Itchy, runny nose    Pollen Extract Itching     Itchy, runny nose       Current Medications     Current Outpatient Medications   Medication Sig Dispense Refill    acetaminophen (TYLENOL) 500 mg tablet Take 500 mg by mouth as needed        atorvastatin (LIPITOR) 20 mg tablet Take 1 tablet (20 mg total) by mouth daily at bedtime 90 tablet 3    Cetirizine HCl (ZYRTEC ALLERGY PO) Take 1 tablet by mouth as needed       cyanocobalamin (VITAMIN B-12) 1,000 mcg tablet Take 2,000 mcg by mouth daily      gabapentin (NEURONTIN) 100 mg capsule Take 3 capsules (300 mg total) by mouth daily at bedtime 90 capsule 1    ibuprofen (MOTRIN) 600 mg tablet Take 1 tablet (600 mg total) by mouth every 6 (six) hours as needed for moderate pain 30 tablet 0    metFORMIN (GLUCOPHAGE) 500 mg tablet TAKE 1 TABLET EVERY DAY WITH A MEAL 30 tablet 5    tiZANidine (ZANAFLEX) 2 mg tablet 1 AT BEDTIME DAILY FOR NECK PAIN 90 tablet 0     No current facility-administered medications for this visit            Health Maintenance     Health Maintenance   Topic Date Due    Pneumococcal Vaccine: Pediatrics (0 to 5 Years) and At-Risk Patients (6 to 59 Years) (1 of 1 - PPSV23) 01/03/1980    HIV Screening  01/03/1989    BMI: Followup Plan  01/03/1992    Hepatitis B Vaccine (1 of 3 - Risk 3-dose series) 01/03/1993    Influenza Vaccine  07/01/2019    Depression Screening PHQ  07/22/2020    BMI: Adult  01/13/2021    MAMMOGRAM  07/15/2021    DTaP,Tdap,and Td Vaccines (3 - Td) 03/09/2026    Pneumococcal Vaccine: 65+ Years (1 of 2 - PCV13) 01/03/2039    HIB Vaccine  Aged Out    IPV Vaccine  Aged Out    Hepatitis A Vaccine  Aged Out    Meningococcal ACWY Vaccine  Aged Out    HPV Vaccine  Aged Out     Immunization History   Administered Date(s) Administered    Fluzone Split Quad 0 5 mL 11/09/2015    INFLUENZA 11/09/2015, 11/01/2018    Td (adult), adsorbed 03/09/2016    Tdap 01/01/2016       Alice Tenorio MD

## 2020-01-21 ENCOUNTER — HOSPITAL ENCOUNTER (EMERGENCY)
Facility: HOSPITAL | Age: 45
Discharge: HOME | End: 2020-01-21
Attending: EMERGENCY MEDICINE | Admitting: EMERGENCY MEDICINE
Payer: OTHER MISCELLANEOUS

## 2020-01-21 ENCOUNTER — APPOINTMENT (EMERGENCY)
Dept: RADIOLOGY | Facility: HOSPITAL | Age: 45
End: 2020-01-21
Payer: OTHER MISCELLANEOUS

## 2020-01-21 VITALS
TEMPERATURE: 98.4 F | BODY MASS INDEX: 28.08 KG/M2 | RESPIRATION RATE: 18 BRPM | OXYGEN SATURATION: 99 % | WEIGHT: 174 LBS | DIASTOLIC BLOOD PRESSURE: 71 MMHG | SYSTOLIC BLOOD PRESSURE: 112 MMHG | HEART RATE: 77 BPM

## 2020-01-21 DIAGNOSIS — S67.190A CRUSHING INJURY OF RIGHT INDEX FINGER, INITIAL ENCOUNTER: Primary | ICD-10-CM

## 2020-01-21 PROCEDURE — 99283 EMERGENCY DEPT VISIT LOW MDM: CPT

## 2020-01-21 PROCEDURE — 99282 EMERGENCY DEPT VISIT SF MDM: CPT | Performed by: EMERGENCY MEDICINE

## 2020-01-21 PROCEDURE — 73130 X-RAY EXAM OF HAND: CPT

## 2020-01-21 NOTE — DISCHARGE INSTRUCTIONS
No fractures were seen on x-ray of your finger  Continue to ice the affected finger  We have placed you in a maria isabel tape  We have provided you with additional tape to continue this for comfort

## 2020-01-22 NOTE — ED PROVIDER NOTES
History  Chief Complaint   Patient presents with    Finger Injury     pt reports finger got smashed by door      HPI  Patient is a 50-year-old female presenting with right index finger pain after having her hand closed in a door  Patient states that immediately following the trauma she had sharp pain around the distal interphalangeal joint  Patient denies paresthesias, swelling, bleeding  Patient denies prior trauma the same digit  Prior to Admission Medications   Prescriptions Last Dose Informant Patient Reported? Taking? Cetirizine HCl (ZYRTEC ALLERGY PO)  Self Yes No   Sig: Take 1 tablet by mouth as needed    acetaminophen (TYLENOL) 500 mg tablet  Self Yes No   Sig: Take 500 mg by mouth as needed     atorvastatin (LIPITOR) 20 mg tablet 2020 at Unknown time  No Yes   Sig: Take 1 tablet (20 mg total) by mouth daily at bedtime   cyanocobalamin (VITAMIN B-12) 1,000 mcg tablet 2020 at Unknown time Self Yes Yes   Sig: Take 2,000 mcg by mouth daily   gabapentin (NEURONTIN) 100 mg capsule 2020 at Unknown time  No Yes   Sig: Take 3 capsules (300 mg total) by mouth daily at bedtime   ibuprofen (MOTRIN) 600 mg tablet   No No   Sig: Take 1 tablet (600 mg total) by mouth every 6 (six) hours as needed for moderate pain   metFORMIN (GLUCOPHAGE) 500 mg tablet   No No   Sig: TAKE 1 TABLET EVERY DAY WITH A MEAL   tiZANidine (ZANAFLEX) 2 mg tablet 2020 at Unknown time  No Yes   Si AT BEDTIME DAILY FOR NECK PAIN      Facility-Administered Medications: None       History reviewed  No pertinent past medical history  Past Surgical History:   Procedure Laterality Date     SECTION      CHOLECYSTECTOMY      TOTAL ABDOMINAL HYSTERECTOMY      Due to fibroids   Ovaries present       Family History   Problem Relation Age of Onset    Diabetes Mother     Hypertension Mother     Heart attack Mother     Stroke Father     No Known Problems Daughter     No Known Problems Maternal Grandmother     No Known Problems Maternal Grandfather     No Known Problems Paternal Grandmother     No Known Problems Paternal Grandfather     No Known Problems Maternal Aunt     No Known Problems Maternal Aunt     No Known Problems Maternal Aunt     No Known Problems Paternal Aunt     No Known Problems Paternal Aunt     No Known Problems Paternal Aunt     No Known Problems Paternal Aunt     No Known Problems Paternal Aunt      I have reviewed and agree with the history as documented  Social History     Tobacco Use    Smoking status: Never Smoker    Smokeless tobacco: Never Used   Substance Use Topics    Alcohol use: No    Drug use: No        Review of Systems   Constitutional: Negative for chills, fatigue and fever  HENT: Negative for hearing loss  Eyes: Negative for visual disturbance  Respiratory: Negative for cough, chest tightness and shortness of breath  Cardiovascular: Negative for chest pain  Gastrointestinal: Negative for abdominal distention, abdominal pain, constipation, diarrhea, nausea and vomiting  Endocrine: Negative for polydipsia and polyuria  Genitourinary: Negative for dysuria and hematuria  Musculoskeletal: Positive for arthralgias (right second distal phalanx)  Skin: Negative for color change and rash  Neurological: Negative for dizziness and headaches  Physical Exam  ED Triage Vitals [01/21/20 1703]   Temperature Pulse Respirations Blood Pressure SpO2   98 4 °F (36 9 °C) 77 18 112/71 99 %      Temp Source Heart Rate Source Patient Position - Orthostatic VS BP Location FiO2 (%)   Oral Monitor Sitting Right arm --      Pain Score       6             Orthostatic Vital Signs  Vitals:    01/21/20 1703   BP: 112/71   Pulse: 77   Patient Position - Orthostatic VS: Sitting       Physical Exam   Constitutional: She is oriented to person, place, and time  She appears well-developed and well-nourished  No distress  HENT:   Head: Normocephalic and atraumatic     Right Ear: External ear normal    Left Ear: External ear normal    Nose: Nose normal    Mouth/Throat: Oropharynx is clear and moist  No oropharyngeal exudate  Eyes: Pupils are equal, round, and reactive to light  Neck: Normal range of motion  Cardiovascular: Normal rate, regular rhythm and normal heart sounds  Exam reveals no gallop and no friction rub  No murmur heard  Pulmonary/Chest: Effort normal and breath sounds normal  No respiratory distress  She has no wheezes  She exhibits no tenderness  Abdominal: Soft  Bowel sounds are normal  She exhibits no distension and no mass  There is no tenderness  There is no guarding  Musculoskeletal: Normal range of motion  She exhibits no edema or deformity  Small amount of ecchymoses overlying right 2nd distal phalanx without significant swelling, full sensation, minor tenderness  Lymphadenopathy:     She has no cervical adenopathy  Neurological: She is alert and oriented to person, place, and time  Skin: Skin is warm and dry  Capillary refill takes less than 2 seconds  She is not diaphoretic  Psychiatric: She has a normal mood and affect  Vitals reviewed        ED Medications  Medications - No data to display    Diagnostic Studies  Results Reviewed     None                 XR hand 3+ views RIGHT    (Results Pending)         Procedures  Procedures      ED Course                               MDM  Number of Diagnoses or Management Options  Crushing injury of right index finger, initial encounter:   Diagnosis management comments: 48-year-old female presenting for evaluation of crush injury to right 2nd distal phalanx, x-ray unremarkable, ice applied, maria isabel-taped to adjacent finger, discharged with instructions to follow up with PCP       Amount and/or Complexity of Data Reviewed  Tests in the radiology section of CPT®: ordered and reviewed  Review and summarize past medical records: yes  Independent visualization of images, tracings, or specimens: yes    Risk of Complications, Morbidity, and/or Mortality  Presenting problems: low  Diagnostic procedures: minimal  Management options: minimal    Patient Progress  Patient progress: improved        Disposition  Final diagnoses:   Crushing injury of right index finger, initial encounter     Time reflects when diagnosis was documented in both MDM as applicable and the Disposition within this note     Time User Action Codes Description Comment    1/21/2020  5:01 PM Cindy Linder Add [L67 190A] Crushing injury of right index finger, initial encounter       ED Disposition     ED Disposition Condition Date/Time Comment    Discharge Stable Tue Jan 21, 2020  5:00 PM Daniela Youssef discharge to home/self care  Follow-up Information    None         Discharge Medication List as of 1/21/2020  5:02 PM      CONTINUE these medications which have NOT CHANGED    Details   atorvastatin (LIPITOR) 20 mg tablet Take 1 tablet (20 mg total) by mouth daily at bedtime, Starting Thu 10/3/2019, Normal      cyanocobalamin (VITAMIN B-12) 1,000 mcg tablet Take 2,000 mcg by mouth daily, Historical Med      gabapentin (NEURONTIN) 100 mg capsule Take 3 capsules (300 mg total) by mouth daily at bedtime, Starting Mon 10/7/2019, Normal      tiZANidine (ZANAFLEX) 2 mg tablet 1 AT BEDTIME DAILY FOR NECK PAIN, Normal      acetaminophen (TYLENOL) 500 mg tablet Take 500 mg by mouth as needed  , Historical Med      Cetirizine HCl (ZYRTEC ALLERGY PO) Take 1 tablet by mouth as needed , Historical Med      ibuprofen (MOTRIN) 600 mg tablet Take 1 tablet (600 mg total) by mouth every 6 (six) hours as needed for moderate pain, Starting Mon 1/6/2020, Print      metFORMIN (GLUCOPHAGE) 500 mg tablet TAKE 1 TABLET EVERY DAY WITH A MEAL, Normal           No discharge procedures on file  ED Provider  Attending physically available and evaluated Daniela Youssef  STEFANO managed the patient along with the ED Attending      Electronically Signed by         Rossi Hinkle Veronica Shelton MD  01/22/20 8831

## 2020-01-22 NOTE — ED ATTENDING ATTESTATION
1/21/2020  IRosario DO, saw and evaluated the patient  I have discussed the patient with the resident/non-physician practitioner and agree with the resident's/non-physician practitioner's findings, Plan of Care, and MDM as documented in the resident's/non-physician practitioner's note, except where noted  All available labs and Radiology studies were reviewed  I was present for key portions of any procedure(s) performed by the resident/non-physician practitioner and I was immediately available to provide assistance  At this point I agree with the current assessment done in the Emergency Department  I have conducted an independent evaluation of this patient a history and physical is as follows:    ED Course        13-year-old female presenting after she states she had her right index finger closed in a door  This occurred while she was at work  She is right-hand dominant  There is mild tenderness to palpation of the distal tip of the right finger  No discoloration  No outward signs of trauma  Normal pulses  Normal capillary refill  X-rays negative for fracture, dislocation, foreign body  Air was maria isabel-taped for comfort, anti-inflammatories, follow up as directed        Critical Care Time  Procedures

## 2020-02-06 ENCOUNTER — EVALUATION (OUTPATIENT)
Dept: PHYSICAL THERAPY | Facility: REHABILITATION | Age: 45
End: 2020-02-06
Payer: OTHER MISCELLANEOUS

## 2020-02-06 ENCOUNTER — TRANSCRIBE ORDERS (OUTPATIENT)
Dept: PHYSICAL THERAPY | Facility: REHABILITATION | Age: 45
End: 2020-02-06

## 2020-02-06 DIAGNOSIS — M25.562 ACUTE PAIN OF LEFT KNEE: Primary | ICD-10-CM

## 2020-02-06 PROCEDURE — 97140 MANUAL THERAPY 1/> REGIONS: CPT | Performed by: PHYSICAL THERAPIST

## 2020-02-06 PROCEDURE — 97110 THERAPEUTIC EXERCISES: CPT | Performed by: PHYSICAL THERAPIST

## 2020-02-06 PROCEDURE — 97162 PT EVAL MOD COMPLEX 30 MIN: CPT | Performed by: PHYSICAL THERAPIST

## 2020-02-06 NOTE — LETTER
2020    Shoaib Garcia, [address] [facility] [address]    Patient: Aye Kumar   YOB: 1975   Date of Visit: 2020     Encounter Diagnosis     ICD-10-CM    1  Acute pain of left knee M25 562        Dear Dr Armando Henriquez: Thank you for your recent referral of Aye Kumar  Please review the attached evaluation summary from Triny's recent visit  Please verify that you agree with the plan of care by signing the attached order  If you have any questions or concerns, please do not hesitate to call  I sincerely appreciate the opportunity to share in the care of one of your patients and hope to have another opportunity to work with you in the near future  Sincerely,    Rosa Elena Palencia, PT      Referring Provider:      I certify that I have read the below Plan of Care and certify the need for these services furnished under this plan of treatment while under my care  Shoaib Garcia MD  [address] [address]  VIA Facsimile: [phone]          PT Evaluation     Today's date: 2020  Patient name: Aye Kumar  : 1975  MRN: [id number]  Referring provider: Stepan Carpio MD  Dx:   Encounter Diagnosis     ICD-10-CM    1  Acute pain of left knee M25 562        Start Time: 1500  Stop Time: 1608  Total time in clinic (min): 68 minutes    Assessment  Assessment details: Aye Kumar is a 54 y o  female who presents with pain, decreased strength, decreased ROM, decreased joint mobility and ambulatory dysfunction  Due to these impairments, Patient has difficulty performing a/iadls, recreational activities, work-related activities and engaging in social activities  Patient's clinical presentation is consistent with their referring diagnosis of Acute pain of left knee  (primary encounter diagnosis)      Patient would benefit from skilled physical therapy to address their aforementioned impairments, improve their level of function and to improve their overall quality of life  Impairments: abnormal gait, abnormal muscle firing, abnormal or restricted ROM, abnormal movement, activity intolerance, impaired balance, impaired physical strength, lacks appropriate home exercise program, pain with function and weight-bearing intolerance  Understanding of Dx/Px/POC: excellent   Prognosis: good    Goals  Short Term:  Pt will report decreased levels of pain by at least 2 subjective ratings in 4 weeks  Pt will demonstrate improved ROM by at least 10 degrees in 4 weeks  Long Term:   Pt will be independent in their HEP in 8 weeks  Pt will demonstrate improved FOTO, > 54  Pt will return to work full duty without pain or limitation  Pt will ambulate community distances without pain  Pt will normalize her gait pattern  Pt will perform a functional squat without pain  Plan  Patient would benefit from: PT eval and skilled speech therapy  Planned modality interventions: low level laser therapy  Planned therapy interventions: manual therapy, joint mobilization, neuromuscular re-education, patient education, strengthening, stretching, therapeutic activities, therapeutic exercise, home exercise program, functional ROM exercises, flexibility, body mechanics training, balance and activity modification  Frequency: 3x week  Duration in weeks: 12  Treatment plan discussed with: patient        Subjective Evaluation    History of Present Illness  Date of onset: 1/6/2019  Mechanism of injury: Pt reports she was squatting at work and went to get up heard a pop and was unable to walk  Pt went to ER and was put in an a knee brace and given crutches she  utilize for a few a few days  She currently is wearing the brace at work and states it helps very minimally  Pt states it is very difficulty to perform your job duties but feels walking is slightly             Not a recurrent problem   Quality of life: fair    Pain  Current pain ratin  At best pain ratin  At worst pain rating: 10  Quality: throbbing, needle-like and burning  Aggravating factors: walking, standing and stair climbing  Progression: no change (slight improvement since original injury but not improved any since the past week )    Social Support  Steps to enter house: yes  Stairs in house: yes   Lives in: multiple-level home  Lives with: adult children and spouse    Employment status: working    Diagnostic Tests  X-ray: normal  Treatments  Current treatment: medication  Patient Goals  Patient goals for therapy: decreased edema, decreased pain, improved balance, increased motion, increased strength, independence with ADLs/IADLs, return to sport/leisure activities and return to work          Objective     Palpation   Left   Tenderness of the rectus femoris and vastus lateralis  Trigger point to rectus femoris and vastus lateralis       Neurological Testing     Sensation     Knee   Left Knee   Intact: light touch    Right Knee   Intact: light touch     Active Range of Motion   Left Knee   Flexion: 91 degrees with pain  Extension: 5 degrees with pain    Right Knee   Flexion: 133 degrees   Extension: 1 degrees     Passive Range of Motion   Left Knee   Flexion: 111 degrees with pain  Extension: 0 degrees with pain    Right Knee   Normal passive range of motion    Strength/Myotome Testing     Right Hip   Normal muscle strength    Left Knee   Flexion: 2+  Extension: 2+    Right Knee   Flexion: 4+  Extension: 4+    Swelling     Left Knee Girth Measurement (cm)   Joint line: 37 5 cm  10 cm above joint line: 45 5 cm  10 cm below joint line: 38 5 cm    Right Knee Girth Measurement (cm)   Joint line: 37 cm  10 cm above joint line: 44 cm  10 cm below joint line: 38 5 cm             Precautions: DM11 standard     Manual  2/6            LUE PROM  LB             STM quad  LB            Pat mobs                                            Exercise Diary  2/6             rec bike  [employer] Slant board  nv            Heel slides  2x10            Quad sets  10x            Hamstring stretch  nv            SAQ  10x            LAQ  8x                                                                                                                                                                                         Modalities

## 2020-02-06 NOTE — PROGRESS NOTES
PT Evaluation     Today's date: 2020  Patient name: Lev Whelan  : 1975  MRN: [id number]  Referring provider: Tyree Haney MD  Dx:   Encounter Diagnosis     ICD-10-CM    1  Acute pain of left knee M25 562        Start Time: 1500  Stop Time: 1608  Total time in clinic (min): 68 minutes    Assessment  Assessment details: Lev Whelan is a 54 y o  female who presents with pain, decreased strength, decreased ROM, decreased joint mobility and ambulatory dysfunction  Due to these impairments, Patient has difficulty performing a/iadls, recreational activities, work-related activities and engaging in social activities  Patient's clinical presentation is consistent with their referring diagnosis of Acute pain of left knee  (primary encounter diagnosis)    Patient would benefit from skilled physical therapy to address their aforementioned impairments, improve their level of function and to improve their overall quality of life  Impairments: abnormal gait, abnormal muscle firing, abnormal or restricted ROM, abnormal movement, activity intolerance, impaired balance, impaired physical strength, lacks appropriate home exercise program, pain with function and weight-bearing intolerance  Understanding of Dx/Px/POC: excellent   Prognosis: good    Goals  Short Term:  Pt will report decreased levels of pain by at least 2 subjective ratings in 4 weeks  Pt will demonstrate improved ROM by at least 10 degrees in 4 weeks  Long Term:   Pt will be independent in their HEP in 8 weeks  Pt will demonstrate improved FOTO, > 54  Pt will return to work full duty without pain or limitation  Pt will ambulate community distances without pain  Pt will normalize her gait pattern  Pt will perform a functional squat without pain         Plan  Patient would benefit from: PT eval and skilled speech therapy  Planned modality interventions: low level laser therapy  Planned therapy interventions: manual therapy, joint mobilization, neuromuscular re-education, patient education, strengthening, stretching, therapeutic activities, therapeutic exercise, home exercise program, functional ROM exercises, flexibility, body mechanics training, balance and activity modification  Frequency: 3x week  Duration in weeks: 12  Treatment plan discussed with: patient        Subjective Evaluation    History of Present Illness  Date of onset: 2019  Mechanism of injury: Pt reports she was squatting at work and went to get up heard a pop and was unable to walk  Pt went to ER and was put in an a knee brace and given crutches she  utilize for a few a few days  She currently is wearing the brace at work and states it helps very minimally  Pt states it is very difficulty to perform your job duties but feels walking is slightly  Not a recurrent problem   Quality of life: fair    Pain  Current pain ratin  At best pain ratin  At worst pain rating: 10  Quality: throbbing, needle-like and burning  Aggravating factors: walking, standing and stair climbing  Progression: no change (slight improvement since original injury but not improved any since the past week )    Social Support  Steps to enter house: yes  Stairs in house: yes   Lives in: multiple-level home  Lives with: adult children and spouse    Employment status: working    Diagnostic Tests  X-ray: normal  Treatments  Current treatment: medication  Patient Goals  Patient goals for therapy: decreased edema, decreased pain, improved balance, increased motion, increased strength, independence with ADLs/IADLs, return to sport/leisure activities and return to work          Objective     Palpation   Left   Tenderness of the rectus femoris and vastus lateralis  Trigger point to rectus femoris and vastus lateralis       Neurological Testing     Sensation     Knee   Left Knee   Intact: light touch    Right Knee   Intact: light touch     Active Range of Motion   Left Knee   Flexion: 91 degrees with pain  Extension: 5 degrees with pain    Right Knee   Flexion: 133 degrees   Extension: 1 degrees     Passive Range of Motion   Left Knee   Flexion: 111 degrees with pain  Extension: 0 degrees with pain    Right Knee   Normal passive range of motion    Strength/Myotome Testing     Right Hip   Normal muscle strength    Left Knee   Flexion: 2+  Extension: 2+    Right Knee   Flexion: 4+  Extension: 4+    Swelling     Left Knee Girth Measurement (cm)   Joint line: 37 5 cm  10 cm above joint line: 45 5 cm  10 cm below joint line: 38 5 cm    Right Knee Girth Measurement (cm)   Joint line: 37 cm  10 cm above joint line: 44 cm  10 cm below joint line: 38 5 cm             Precautions: DM11 standard     Manual  2/6            LUE PROM  LB             STM quad  LB            Pat mobs                                            Exercise Diary  2/6             rec bike  InPlace  nv            Heel slides  2x10            Quad sets  10x            Hamstring stretch  nv            SAQ  10x            LAQ  8x                                                                                                                                                                                         Modalities

## 2020-02-10 ENCOUNTER — OFFICE VISIT (OUTPATIENT)
Dept: PHYSICAL THERAPY | Facility: REHABILITATION | Age: 45
End: 2020-02-10
Payer: OTHER MISCELLANEOUS

## 2020-02-10 DIAGNOSIS — M25.562 ACUTE PAIN OF LEFT KNEE: Primary | ICD-10-CM

## 2020-02-10 PROCEDURE — 97140 MANUAL THERAPY 1/> REGIONS: CPT | Performed by: PHYSICAL THERAPIST

## 2020-02-10 PROCEDURE — 97110 THERAPEUTIC EXERCISES: CPT | Performed by: PHYSICAL THERAPIST

## 2020-02-10 NOTE — PROGRESS NOTES
Daily Note     Today's date: 2/10/2020  Patient name: Shannan Perera  : 1975  MRN: [id number]  Referring provider: Aleyda Cook MD  Dx:   Encounter Diagnosis     ICD-10-CM    1  Acute pain of left knee M25 562        Start Time: 1630  Stop Time: 1710  Total time in clinic (min): 40 minutes    Subjective: Pt feels like her leg is weak  Also, she was standing on her legs for about an hour while shopping at [employer] today and c/o increased pain  Objective: See treatment diary below  Assessment: Tolerated treatment fair  Patient would benefit from continued PT    Plan: Continue per plan of care        Precautions: DM11 standard     Manual  2/6 02/10           LUE PROM  LB             STM quad  LB            Pat mobs              Prone quad stretch  LMR           Gr 1 oscillations - tib/fem joint  LMR           Gr 2 post glide tibia  LMR                                                      Exercise Diary               rec [employer] MXR  nv            Heel slides  2x10            Quad sets  10x            Hamstring stretch  nv            SAQ  10x            LAQ  8x                                                                                                                                                                                         Modalities

## 2020-02-12 ENCOUNTER — OFFICE VISIT (OUTPATIENT)
Dept: PHYSICAL THERAPY | Facility: REHABILITATION | Age: 45
End: 2020-02-12
Payer: OTHER MISCELLANEOUS

## 2020-02-12 DIAGNOSIS — M25.562 ACUTE PAIN OF LEFT KNEE: Primary | ICD-10-CM

## 2020-02-12 PROCEDURE — 97110 THERAPEUTIC EXERCISES: CPT | Performed by: PHYSICAL THERAPIST

## 2020-02-12 PROCEDURE — 97140 MANUAL THERAPY 1/> REGIONS: CPT | Performed by: PHYSICAL THERAPIST

## 2020-02-12 NOTE — PROGRESS NOTES
Daily Note     Today's date: 2020  Patient name: Severino Virk  : 1975  MRN: [id number]  Referring provider: Huber Hernandez MD  Dx:   Encounter Diagnosis     ICD-10-CM    1  Acute pain of left knee M25 562                   Subjective: Pt states she is in a great deal of pain  Her pain is the wort after she works  She states she can tolerate walking but unable to bend her LLE without greater than 8/10 pain  Objective: See treatment diary below      Assessment: Tolerated treatment poor and unable tolerate  Pt is unbale to tolerate PROM heel slides any quad contraction or stretch at this time   Patient put on hold till she speaks with MD      Plan: on hold till MD appt      Precautions: DM11 standard     Manual  2/12 02/10           LUE PROM  LB cound not tolerate much            STM quad  LB decreased tolerance             Pat mobs              Prone quad stretch  LMR           Gr 1 oscillations - tib/fem joint  LMR           Gr 2 post glide tibia  LMR                                     Laser 13   LB pain to touch with laser                Exercise Diary             rec bike  nv 3 min unable to tolerate           Slant board  nv            Heel slides  2x10 unable to tolerate trialed prone increased pain performed 10 to tolerance            Quad sets  10x 8x            Hamstring stretch  nv            SAQ  10x            LAQ  8x                                                                                                                                                                                         Modalities

## 2020-02-18 ENCOUNTER — APPOINTMENT (OUTPATIENT)
Dept: PHYSICAL THERAPY | Facility: REHABILITATION | Age: 45
End: 2020-02-18
Payer: OTHER MISCELLANEOUS

## 2020-02-20 ENCOUNTER — APPOINTMENT (OUTPATIENT)
Dept: PHYSICAL THERAPY | Facility: REHABILITATION | Age: 45
End: 2020-02-20
Payer: OTHER MISCELLANEOUS

## 2020-02-21 ENCOUNTER — APPOINTMENT (OUTPATIENT)
Dept: PHYSICAL THERAPY | Facility: REHABILITATION | Age: 45
End: 2020-02-21
Payer: OTHER MISCELLANEOUS

## 2020-02-21 ENCOUNTER — LAB (OUTPATIENT)
Dept: LAB | Facility: CLINIC | Age: 45
End: 2020-02-21
Payer: COMMERCIAL

## 2020-02-21 DIAGNOSIS — E11.9 TYPE 2 DIABETES MELLITUS WITHOUT COMPLICATION, WITHOUT LONG-TERM CURRENT USE OF INSULIN (HCC): ICD-10-CM

## 2020-02-21 DIAGNOSIS — M25.562 ACUTE PAIN OF LEFT KNEE: Primary | ICD-10-CM

## 2020-02-21 DIAGNOSIS — E55.9 VITAMIN D DEFICIENCY: ICD-10-CM

## 2020-02-21 DIAGNOSIS — E78.1 HYPERTRIGLYCERIDEMIA: ICD-10-CM

## 2020-02-21 LAB
25(OH)D3 SERPL-MCNC: 30.4 NG/ML (ref 30–100)
ALBUMIN SERPL BCP-MCNC: 4 G/DL (ref 3.5–5)
ALP SERPL-CCNC: 64 U/L (ref 46–116)
ALT SERPL W P-5'-P-CCNC: 33 U/L (ref 12–78)
ANION GAP SERPL CALCULATED.3IONS-SCNC: 3 MMOL/L (ref 4–13)
AST SERPL W P-5'-P-CCNC: 17 U/L (ref 5–45)
BILIRUB SERPL-MCNC: 0.59 MG/DL (ref 0.2–1)
BUN SERPL-MCNC: 11 MG/DL (ref 5–25)
CALCIUM SERPL-MCNC: 9.2 MG/DL (ref 8.3–10.1)
CHLORIDE SERPL-SCNC: 105 MMOL/L (ref 100–108)
CHOLEST SERPL-MCNC: 184 MG/DL (ref 50–200)
CO2 SERPL-SCNC: 29 MMOL/L (ref 21–32)
CREAT SERPL-MCNC: 0.56 MG/DL (ref 0.6–1.3)
EST. AVERAGE GLUCOSE BLD GHB EST-MCNC: 146 MG/DL
GFR SERPL CREATININE-BSD FRML MDRD: 112 ML/MIN/1.73SQ M
GLUCOSE P FAST SERPL-MCNC: 106 MG/DL (ref 65–99)
HBA1C MFR BLD: 6.7 %
HDLC SERPL-MCNC: 40 MG/DL
LDLC SERPL CALC-MCNC: 79 MG/DL (ref 0–100)
POTASSIUM SERPL-SCNC: 3.9 MMOL/L (ref 3.5–5.3)
PROT SERPL-MCNC: 7.4 G/DL (ref 6.4–8.2)
SODIUM SERPL-SCNC: 137 MMOL/L (ref 136–145)
TRIGL SERPL-MCNC: 327 MG/DL

## 2020-02-21 PROCEDURE — 80053 COMPREHEN METABOLIC PANEL: CPT

## 2020-02-21 PROCEDURE — 3044F HG A1C LEVEL LT 7.0%: CPT | Performed by: PHYSICAL MEDICINE & REHABILITATION

## 2020-02-21 PROCEDURE — 36415 COLL VENOUS BLD VENIPUNCTURE: CPT

## 2020-02-21 PROCEDURE — 97140 MANUAL THERAPY 1/> REGIONS: CPT | Performed by: PHYSICAL THERAPIST

## 2020-02-21 PROCEDURE — 82306 VITAMIN D 25 HYDROXY: CPT

## 2020-02-21 PROCEDURE — 80061 LIPID PANEL: CPT

## 2020-02-21 PROCEDURE — 97110 THERAPEUTIC EXERCISES: CPT | Performed by: PHYSICAL THERAPIST

## 2020-02-21 PROCEDURE — 83036 HEMOGLOBIN GLYCOSYLATED A1C: CPT

## 2020-02-21 NOTE — PROGRESS NOTES
Daily Note     Today's date: 2020  Patient name: Cherelle Camacho  : 1975  MRN: [id number]  Referring provider: Solange Elizondo MD  Dx:   Encounter Diagnosis     ICD-10-CM    1  Acute pain of left knee M25 562        Start Time: 830  Stop Time: 910  Total time in clinic (min): 40 minutes    Subjective: I am doing better but if I am on my feet too much I have pain  Objective: See treatment diary below      Assessment: Tolerated treatment fair  Patient would benefit from continued PT      Plan: Continue per plan of care  Progress treatment as tolerated         Precautions: DM11 standard     Manual             LUE PROM  LB cound not tolerate much LB           STM quad  LB decreased tolerance  LB           Pat mobs              Prone quad stretch             Gr 1 oscillations - tib/fem joint             Gr 2 post glide tibia                                       Laser 13                  Exercise Diary             rec bike  nv 10           Slant board  nv 3x30           Heel slides  2x10 3x10 AAROM           Quad sets  10x 10x           Hamstring stretch  nv LB manual            SAQ  10x 10x           LAQ  8x 5x                                                                                                                                                                                        Modalities

## 2020-02-24 ENCOUNTER — APPOINTMENT (OUTPATIENT)
Dept: PHYSICAL THERAPY | Facility: REHABILITATION | Age: 45
End: 2020-02-24
Payer: OTHER MISCELLANEOUS

## 2020-02-26 ENCOUNTER — HOSPITAL ENCOUNTER (EMERGENCY)
Facility: HOSPITAL | Age: 45
Discharge: HOME | End: 2020-02-26
Attending: EMERGENCY MEDICINE | Admitting: EMERGENCY MEDICINE
Payer: OTHER MISCELLANEOUS

## 2020-02-26 VITALS
TEMPERATURE: 97.5 F | DIASTOLIC BLOOD PRESSURE: 82 MMHG | HEART RATE: 84 BPM | RESPIRATION RATE: 18 BRPM | OXYGEN SATURATION: 98 % | SYSTOLIC BLOOD PRESSURE: 135 MMHG | BODY MASS INDEX: 28.12 KG/M2 | WEIGHT: 175 LBS | HEIGHT: 66 IN

## 2020-02-26 DIAGNOSIS — M79.604 RIGHT LEG PAIN: Primary | ICD-10-CM

## 2020-02-26 PROCEDURE — 99283 EMERGENCY DEPT VISIT LOW MDM: CPT

## 2020-02-26 PROCEDURE — 96372 THER/PROPH/DIAG INJ SC/IM: CPT

## 2020-02-26 PROCEDURE — 99284 EMERGENCY DEPT VISIT MOD MDM: CPT | Performed by: EMERGENCY MEDICINE

## 2020-02-26 RX ORDER — KETOROLAC TROMETHAMINE 30 MG/ML
15 INJECTION, SOLUTION INTRAMUSCULAR; INTRAVENOUS ONCE
Status: COMPLETED | OUTPATIENT
Start: 2020-02-26 | End: 2020-02-26

## 2020-02-26 RX ADMIN — KETOROLAC TROMETHAMINE 15 MG: 30 INJECTION, SOLUTION INTRAMUSCULAR at 22:54

## 2020-02-27 ENCOUNTER — TELEPHONE (OUTPATIENT)
Dept: FAMILY MEDICINE CLINIC | Facility: CLINIC | Age: 45
End: 2020-02-27

## 2020-02-27 ENCOUNTER — APPOINTMENT (OUTPATIENT)
Dept: PHYSICAL THERAPY | Facility: REHABILITATION | Age: 45
End: 2020-02-27
Payer: OTHER MISCELLANEOUS

## 2020-02-27 DIAGNOSIS — E11.9 TYPE 2 DIABETES MELLITUS WITHOUT COMPLICATION, WITHOUT LONG-TERM CURRENT USE OF INSULIN (HCC): ICD-10-CM

## 2020-02-27 DIAGNOSIS — R53.83 FATIGUE, UNSPECIFIED TYPE: ICD-10-CM

## 2020-02-27 DIAGNOSIS — E78.1 HYPERTRIGLYCERIDEMIA: Primary | ICD-10-CM

## 2020-02-27 NOTE — RESULT ENCOUNTER NOTE
Please let patient know that her total cholesterol is now 184, increased from 121, her bad cholesterol is now had 79, her good cholesterol remains good and her triglycerides have increased, noted to be 327  I would like her to work on increasing heart healthy fats in the diet by incorporating the Mediterranean diet, avoiding saturated fats such as better and red meat, starting exercise to work on weight loss  I would like to monitor this closely and recheck again in 3 months  In addition, I would like her to take a fish oil 1000 mg twice daily for triglycerides  Can you ask her if she is still taking her atorvastatin? In addition, her other blood work including kidneys, liver was within normal range  Her hemoglobin A1c has increased, noted to be 6 7, in the early diabetic range  I would like her to work on avoiding sweets, limiting carbohydrates as well as start regular exercise  I would like to recheck her A1c in 3 months and will print this out  In addition, her vitamin-D has improved, noted to be 30 4 increased from 8 6  I would like her to take 4000 International Units daily in the winter months and decrease this to 2000 International Units daily in the summer months    Thank you

## 2020-02-27 NOTE — TELEPHONE ENCOUNTER
----- Message from Rajwinder Meza MD sent at 2/27/2020  1:47 PM EST -----  Please let patient know that her total cholesterol is now 184, increased from 121, her bad cholesterol is now had 79, her good cholesterol remains good and her triglycerides have increased, noted to be 327  I would like her to work on increasing heart healthy fats in the diet by incorporating the Mediterranean diet, avoiding saturated fats such as better and red meat, starting exercise to work on weight loss  I would like to monitor this closely and recheck again in 3 months  In addition, I would like her to take a fish oil 1000 mg twice daily for triglycerides  Can you ask her if she is still taking her atorvastatin? In addition, her other blood work including kidneys, liver was within normal range  Her hemoglobin A1c has increased, noted to be 6 7, in the early diabetic range  I would like her to work on avoiding sweets, limiting carbohydrates as well as start regular exercise  I would like to recheck her A1c in 3 months and will print this out  In addition, her vitamin-D has improved, noted to be 30 4 increased from 8 6  I would like her to take 4000 International Units daily in the winter months and decrease this to 2000 International Units daily in the summer months    Thank you

## 2020-02-28 ENCOUNTER — APPOINTMENT (OUTPATIENT)
Dept: PHYSICAL THERAPY | Facility: REHABILITATION | Age: 45
End: 2020-02-28
Payer: OTHER MISCELLANEOUS

## 2020-02-28 ENCOUNTER — TELEPHONE (OUTPATIENT)
Dept: FAMILY MEDICINE CLINIC | Facility: CLINIC | Age: 45
End: 2020-02-28

## 2020-02-28 NOTE — TELEPHONE ENCOUNTER
----- Message from Forrest Lanes, MD sent at 2/27/2020  1:47 PM EST -----  Please let patient know that her total cholesterol is now 184, increased from 121, her bad cholesterol is now had 79, her good cholesterol remains good and her triglycerides have increased, noted to be 327  I would like her to work on increasing heart healthy fats in the diet by incorporating the Mediterranean diet, avoiding saturated fats such as better and red meat, starting exercise to work on weight loss  I would like to monitor this closely and recheck again in 3 months  In addition, I would like her to take a fish oil 1000 mg twice daily for triglycerides  Can you ask her if she is still taking her atorvastatin? In addition, her other blood work including kidneys, liver was within normal range  Her hemoglobin A1c has increased, noted to be 6 7, in the early diabetic range  I would like her to work on avoiding sweets, limiting carbohydrates as well as start regular exercise  I would like to recheck her A1c in 3 months and will print this out  In addition, her vitamin-D has improved, noted to be 30 4 increased from 8 6  I would like her to take 4000 International Units daily in the winter months and decrease this to 2000 International Units daily in the summer months    Thank you

## 2020-02-28 NOTE — TELEPHONE ENCOUNTER
She does not have to restart the atorvastatin  I would like her to work on lifestyle changes and I will recheck again in a few months  I have printed out the blood work Rx    Thank you

## 2020-02-28 NOTE — TELEPHONE ENCOUNTER
PT STATED THAT SHE IS NOT TAKING THE ATROVASTATIN SHE IS TAKING TO MANY MEDICATION SHE HAVE LEG  INJURY AT WORK AND SHE IS IN BED REST,

## 2020-02-28 NOTE — TELEPHONE ENCOUNTER
----- Message from Jyoti Botello MD sent at 2/27/2020  1:47 PM EST -----  Please let patient know that her total cholesterol is now 184, increased from 121, her bad cholesterol is now had 79, her good cholesterol remains good and her triglycerides have increased, noted to be 327  I would like her to work on increasing heart healthy fats in the diet by incorporating the Mediterranean diet, avoiding saturated fats such as better and red meat, starting exercise to work on weight loss  I would like to monitor this closely and recheck again in 3 months  In addition, I would like her to take a fish oil 1000 mg twice daily for triglycerides  Can you ask her if she is still taking her atorvastatin? In addition, her other blood work including kidneys, liver was within normal range  Her hemoglobin A1c has increased, noted to be 6 7, in the early diabetic range  I would like her to work on avoiding sweets, limiting carbohydrates as well as start regular exercise  I would like to recheck her A1c in 3 months and will print this out  In addition, her vitamin-D has improved, noted to be 30 4 increased from 8 6  I would like her to take 4000 International Units daily in the winter months and decrease this to 2000 International Units daily in the summer months    Thank you

## 2020-03-02 NOTE — ED PROVIDER NOTES
History  Chief Complaint   Patient presents with    Leg Pain     Pt reports L leg injury 1/6/20 after a work related injury  Was placed on light duty at work, has since injured R leg  Pt states "I can;t work and I couldn't get an appointment with my PCP "      58-year-old female presents for right leg pain  Patient recently had a left leg injury at work  Seems to be a quadriceps strain  Patient reports being at work recently now her right leg hurts  No acute injury  No caudal symptoms  No back pain  Patient is very difficult to get any history out of, she is very concerned about getting a will work note  She essentially has a negative review of systems otherwise  She states that she cannot walk but she was able to walk into the emergency department and then requested a wheelchair  On exam the patient has normal reflexes in bilateral lower extremities  Full range of motion  No midline lumbar vertebral tenderness  No CVA tenderness  Assessment plan:  Leg pain with chronic no clear cause, likely not urgent has this been going on for over a month now has been going back and forth between the legs  Follow up with occupational medicine      Leg Pain   Location:  Leg  Leg location:  L leg and R leg  Pain details:     Quality:  Aching    Radiates to:  Does not radiate    Severity:  Mild    Onset quality:  Sudden    Timing:  Intermittent    Progression:  Waxing and waning  Chronicity:  Recurrent  Associated symptoms: no back pain, no fatigue, no fever and no neck pain        Prior to Admission Medications   Prescriptions Last Dose Informant Patient Reported? Taking?    Cetirizine HCl (ZYRTEC ALLERGY PO)  Self Yes Yes   Sig: Take 1 tablet by mouth as needed    acetaminophen (TYLENOL) 500 mg tablet  Self Yes Yes   Sig: Take 500 mg by mouth as needed     atorvastatin (LIPITOR) 20 mg tablet   No Yes   Sig: Take 1 tablet (20 mg total) by mouth daily at bedtime   cyanocobalamin (VITAMIN B-12) 1,000 mcg tablet  Self Yes Yes   Sig: Take 2,000 mcg by mouth daily   gabapentin (NEURONTIN) 100 mg capsule   No Yes   Sig: Take 3 capsules (300 mg total) by mouth daily at bedtime   ibuprofen (MOTRIN) 600 mg tablet   No Yes   Sig: Take 1 tablet (600 mg total) by mouth every 6 (six) hours as needed for moderate pain   metFORMIN (GLUCOPHAGE) 500 mg tablet   No Yes   Sig: TAKE 1 TABLET EVERY DAY WITH A MEAL   tiZANidine (ZANAFLEX) 2 mg tablet   No Yes   Si AT BEDTIME DAILY FOR NECK PAIN      Facility-Administered Medications: None       History reviewed  No pertinent past medical history  Past Surgical History:   Procedure Laterality Date     SECTION      CHOLECYSTECTOMY      TOTAL ABDOMINAL HYSTERECTOMY      Due to fibroids  Ovaries present       Family History   Problem Relation Age of Onset    Diabetes Mother     Hypertension Mother     Heart attack Mother     Stroke Father     No Known Problems Daughter     No Known Problems Maternal Grandmother     No Known Problems Maternal Grandfather     No Known Problems Paternal Grandmother     No Known Problems Paternal Grandfather     No Known Problems Maternal Aunt     No Known Problems Maternal Aunt     No Known Problems Maternal Aunt     No Known Problems Paternal Aunt     No Known Problems Paternal Aunt     No Known Problems Paternal Aunt     No Known Problems Paternal Aunt     No Known Problems Paternal Aunt      I have reviewed and agree with the history as documented  E-Cigarette/Vaping     E-Cigarette/Vaping Substances     Social History     Tobacco Use    Smoking status: Never Smoker    Smokeless tobacco: Never Used   Substance Use Topics    Alcohol use: No    Drug use: No       Review of Systems   Constitutional: Negative for chills, fatigue and fever  Eyes: Negative for photophobia and visual disturbance  Respiratory: Negative for cough and shortness of breath  Cardiovascular: Negative for chest pain, palpitations and leg swelling  Gastrointestinal: Negative for diarrhea, nausea and vomiting  Endocrine: Negative for polydipsia and polyuria  Genitourinary: Negative for decreased urine volume, difficulty urinating, dysuria and frequency  Musculoskeletal: Negative for back pain, neck pain and neck stiffness  Skin: Negative for color change and rash  Allergic/Immunologic: Negative for environmental allergies and immunocompromised state  Neurological: Negative for dizziness and headaches  Hematological: Negative for adenopathy  Does not bruise/bleed easily  Psychiatric/Behavioral: Negative for dysphoric mood  The patient is not nervous/anxious  Physical Exam  Physical Exam   Constitutional: She is oriented to person, place, and time  She appears well-developed and well-nourished  No distress  HENT:   Head: Normocephalic and atraumatic  Nose: Nose normal    Eyes: Pupils are equal, round, and reactive to light  Conjunctivae and EOM are normal  No scleral icterus  Neck: Normal range of motion  Neck supple  No JVD present  No tracheal deviation present  No thyromegaly present  Cardiovascular: Normal rate, regular rhythm, normal heart sounds and intact distal pulses  Exam reveals no gallop and no friction rub  Pulmonary/Chest: Effort normal and breath sounds normal  No respiratory distress  She has no wheezes  She has no rales  She exhibits no tenderness  Abdominal: Soft  Bowel sounds are normal  She exhibits no distension and no mass  There is no tenderness  There is no rebound and no guarding  No hernia  Musculoskeletal: Normal range of motion  She exhibits no edema, tenderness or deformity  Neurological: She is alert and oriented to person, place, and time  She has normal reflexes  No cranial nerve deficit  Coordination normal    Skin: Skin is warm and dry  She is not diaphoretic  No erythema  Psychiatric: She has a normal mood and affect   Her behavior is normal    Nursing note and vitals reviewed  Vital Signs  ED Triage Vitals [02/26/20 2200]   Temperature Pulse Respirations Blood Pressure SpO2   97 5 °F (36 4 °C) 84 18 135/82 98 %      Temp Source Heart Rate Source Patient Position - Orthostatic VS BP Location FiO2 (%)   Oral Monitor Sitting Right arm --      Pain Score       Worst Possible Pain           Vitals:    02/26/20 2200   BP: 135/82   Pulse: 84   Patient Position - Orthostatic VS: Sitting         Visual Acuity      ED Medications  Medications   ketorolac (TORADOL) injection 15 mg (15 mg Intramuscular Given 2/26/20 1914)       Diagnostic Studies  Results Reviewed     None                 No orders to display              Procedures  Procedures         ED Course                               MDM  Number of Diagnoses or Management Options  Right leg pain: new and requires workup        Disposition  Final diagnoses:   Right leg pain     Time reflects when diagnosis was documented in both MDM as applicable and the Disposition within this note     Time User Action Codes Description Comment    2/26/2020 11:16 PM Ishan Shukla Add [Z12 979] Right leg pain       ED Disposition     ED Disposition Condition Date/Time Comment    Discharge Stable Wed Feb 26, 2020 11:16 PM Nathaly Emerson discharge to home/self care              Follow-up Information    None         Discharge Medication List as of 2/26/2020 11:16 PM      CONTINUE these medications which have NOT CHANGED    Details   acetaminophen (TYLENOL) 500 mg tablet Take 500 mg by mouth as needed  , Historical Med      atorvastatin (LIPITOR) 20 mg tablet Take 1 tablet (20 mg total) by mouth daily at bedtime, Starting Thu 10/3/2019, Normal      Cetirizine HCl (ZYRTEC ALLERGY PO) Take 1 tablet by mouth as needed , Historical Med      cyanocobalamin (VITAMIN B-12) 1,000 mcg tablet Take 2,000 mcg by mouth daily, Historical Med      gabapentin (NEURONTIN) 100 mg capsule Take 3 capsules (300 mg total) by mouth daily at bedtime, Starting Mon 10/7/2019, Normal      ibuprofen (MOTRIN) 600 mg tablet Take 1 tablet (600 mg total) by mouth every 6 (six) hours as needed for moderate pain, Starting Mon 1/6/2020, Print      metFORMIN (GLUCOPHAGE) 500 mg tablet TAKE 1 TABLET EVERY DAY WITH A MEAL, Normal      tiZANidine (ZANAFLEX) 2 mg tablet 1 AT BEDTIME DAILY FOR NECK PAIN, Normal           No discharge procedures on file      PDMP Review     None          ED Provider  Electronically Signed by           Sangeetha Dorantes DO  03/02/20 [id number]

## 2020-03-04 ENCOUNTER — OFFICE VISIT (OUTPATIENT)
Dept: PHYSICAL THERAPY | Facility: REHABILITATION | Age: 45
End: 2020-03-04
Payer: OTHER MISCELLANEOUS

## 2020-03-04 DIAGNOSIS — M25.562 ACUTE PAIN OF LEFT KNEE: Primary | ICD-10-CM

## 2020-03-04 PROCEDURE — 97140 MANUAL THERAPY 1/> REGIONS: CPT

## 2020-03-04 PROCEDURE — 97110 THERAPEUTIC EXERCISES: CPT

## 2020-03-04 NOTE — PROGRESS NOTES
Daily Note     Today's date: 3/4/2020  Patient name: Braulio León  : 1975  MRN: [id number]  Referring provider: Piper Garcia MD  Dx:   Encounter Diagnosis     ICD-10-CM    1  Acute pain of left knee M25 562                   Subjective: Patient noted pain in L knee and quad  Patient presented to treatment with a new script for L quadriceps tendon  Strain  According to script weight bearing status as tolerated  Patient noted that doctor prescribed new medicine diclofenac sodium delayed release tablet usp and that it seems to be helping her with decreased pain levels  Patient noted 5/10 pain L knee and into quad  Patient noted soreness fatigue post treatment  Patient noted B knee pain today  Objective: See treatment diary below      Assessment: Tolerated treatment fair  Performed exercises to patient tolerance  STM helped to decrease fascia restrictions in quad  Checked patient crutch height and re adjusted crutches to correct patient height along with reviewing with patient correct way to walk with crutches  Patient would benefit from continued PT      Plan: Continue per plan of care        Precautions: DM11 standard     Manual   3/4          LUE PROM  LB cound not tolerate much LB LLE AF          STM quad  LB decreased tolerance  LB AF          Pat mobs              Prone quad stretch             Gr 1 oscillations - tib/fem joint             Gr 2 post glide tibia                                       Laser 13                  Exercise Diary   3/4          rec bike  nv 10 10          Slant board  nv 3x30 seated GS          Heel slides  2x10 3x10 AAROM 3x10 AAROM          Quad sets  10x 10x 3"x10          Hamstring stretch  nv LB manual  Af to bhupinder          SAQ  10x 10x 5"x10          LAQ  8x 5x 10 x 5'' Modalities

## 2020-03-05 ENCOUNTER — OFFICE VISIT (OUTPATIENT)
Dept: FAMILY MEDICINE CLINIC | Facility: CLINIC | Age: 45
End: 2020-03-05
Payer: COMMERCIAL

## 2020-03-05 VITALS
DIASTOLIC BLOOD PRESSURE: 70 MMHG | RESPIRATION RATE: 18 BRPM | OXYGEN SATURATION: 98 % | SYSTOLIC BLOOD PRESSURE: 100 MMHG | HEIGHT: 66 IN | TEMPERATURE: 97.2 F | BODY MASS INDEX: 28.25 KG/M2 | HEART RATE: 87 BPM

## 2020-03-05 DIAGNOSIS — E78.1 HYPERTRIGLYCERIDEMIA: ICD-10-CM

## 2020-03-05 DIAGNOSIS — E53.8 VITAMIN B12 DEFICIENCY: ICD-10-CM

## 2020-03-05 DIAGNOSIS — E11.9 TYPE 2 DIABETES MELLITUS WITHOUT COMPLICATION, WITHOUT LONG-TERM CURRENT USE OF INSULIN (HCC): ICD-10-CM

## 2020-03-05 DIAGNOSIS — M79.605 LEFT LEG PAIN: ICD-10-CM

## 2020-03-05 DIAGNOSIS — Z09 ENCOUNTER FOR EXAMINATION FOLLOWING TREATMENT AT HOSPITAL: Primary | ICD-10-CM

## 2020-03-05 DIAGNOSIS — E55.9 VITAMIN D DEFICIENCY: ICD-10-CM

## 2020-03-05 PROCEDURE — 3044F HG A1C LEVEL LT 7.0%: CPT | Performed by: FAMILY MEDICINE

## 2020-03-05 PROCEDURE — 1036F TOBACCO NON-USER: CPT | Performed by: FAMILY MEDICINE

## 2020-03-05 PROCEDURE — 99214 OFFICE O/P EST MOD 30 MIN: CPT | Performed by: FAMILY MEDICINE

## 2020-03-05 RX ORDER — DICLOFENAC SODIUM 75 MG/1
75 TABLET, DELAYED RELEASE ORAL 2 TIMES DAILY
COMMUNITY
End: 2020-10-02

## 2020-03-05 RX ORDER — ERGOCALCIFEROL 1.25 MG/1
CAPSULE ORAL WEEKLY
COMMUNITY
Start: 2020-01-13 | End: 2020-10-02

## 2020-03-05 NOTE — PROGRESS NOTES
FAMILY PRACTICE OFFICE VISIT       NAME: Lam Candelaria  AGE: 54 y o  SEX: female       : 1975        MRN: [id number]    DATE: 3/14/2020  TIME: 12:42 PM    Assessment and Plan     Problem List Items Addressed This Visit        Other    Hypertriglyceridemia    Vitamin D deficiency    Vitamin B12 deficiency    Relevant Orders    Vitamin B12      Other Visit Diagnoses     Encounter for examination following treatment at hospital    -  Primary    Left leg pain        Type 2 diabetes mellitus without complication, without long-term current use of insulin ([address] )            Recent hospital follow-up:  Patient presents for follow-up of 2 ER visits that occurred once in January for left leg pain and once in February for right leg pain  She has been followed by occupational medicine, Dr Cathie Reid as well as [facility] for left hip, left thigh, left knee pain  She was also seen at 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  She will continue to physical therapy as well  If her symptoms should worsen, she should let us know or go to the emergency room  She is agreeable with the plan  Have offered low back imaging as well as EMG nerve conduction however patient declines at present  No red flag signs noted  Hypertriglyceridemia:  Have encouraged patient on lifestyle modifications, limiting saturated fats, starting regular exercise as tolerated  Recommend fish oil 1000 mg twice daily  Recommend restarting atorvastatin  Will check in 3 months  Diabetes:  Recent A1c noted to be 6 7  Have strongly encouraged patient to work on lifestyle modifications, avoid sweets, limit carbohydrates, start metformin  Vitamin-D deficiency:  Recommend taking 4000 International Units daily in the winter months and decrease this to 2000 International Units daily in the summer months  Will continue to monitor  Vitamin B12:  Continue supplementation  Will recheck b12 along with next blood work  There are no Patient Instructions on file for this visit  Chief Complaint     Chief Complaint   Patient presents with    Follow-up     discuss medical preblem     R leg pain       History of Present Illness     HPI   40-year-old female presents today for follow-up of chronic conditions, review blood work results as as review recent events of left leg pain  Patient states she went to the ER on 01/06 for left leg pain  Pain started while she was at work? [employer] She would prefer to occupational medicine, saw Dr Hernández on 02/18  She was also referred to Dr Chavis at [place] [id number], last seen on Tuesday and was told she has spring of quadriceps muscle  Was told to do physical therapy 3 times weekly and was given prescription for diclofenac for pain control  She states this is helping for pain  Has had burning pain over her left thigh as well  Pain is aggravated with movement  The pain starts with her left knee, traveled her groin and left hip  There is also mild tingling over her left thigh and mild weakness however denies any back pain at present  Patient states her left hip pain improves if her left knee and thigh pain improve  Has been taking gabapentin 300 mg at bedtime  This has been prescribed for left shoulder  Takes tizanidine given by dr Mary Muniz helps  Has been going to PT  Patient states her work does not want her to go back to Dr Brody Martinez, occupational medicine    In between, [facility] as recommended by Dr Brody Martinez who ordered the MRI of left knee  Patient has not been informed of her MRI results  She will call for the results  Patient states her work sent patient for 2nd opinion to dr Yuliana Barber     Regarding blood sugar, patient was previously taking an herbal formula she was mixing herself which included fenugreek help control her blood sugar  She has not been taking this recently  Review of Systems   Review of Systems   Respiratory: Negative for shortness of breath  Cardiovascular: Negative  Gastrointestinal: Negative for abdominal pain and constipation  Musculoskeletal:        Left hip, left thigh, left knee pain   Neurological: Negative for dizziness and light-headedness  Active Problem List     Patient Active Problem List   Diagnosis    Hypertriglyceridemia    Elevated blood sugar    Vitamin D deficiency    Neck pain    Generalized anxiety disorder    Insomnia    Fatigue    Routine health maintenance    Vitamin B12 deficiency    Left shoulder pain    Cervical radiculopathy    Myofascial pain    Migraine without aura and without status migrainosus, not intractable    DDD (degenerative disc disease), cervical    Spondylosis of cervical spine without myelopathy       Past Medical History:  History reviewed  No pertinent past medical history  Past Surgical History:  Past Surgical History:   Procedure Laterality Date     SECTION      CHOLECYSTECTOMY      TOTAL ABDOMINAL HYSTERECTOMY      Due to fibroids   Ovaries present       Family History:  Family History   Problem Relation Age of Onset    Diabetes Mother     Hypertension Mother     Heart attack Mother     Stroke Father     No Known Problems Daughter     No Known Problems Maternal Grandmother     No Known Problems Maternal Grandfather     No Known Problems Paternal Grandmother     No Known Problems Paternal Grandfather     No Known Problems Maternal Aunt     No Known Problems Maternal Aunt     No Known Problems Maternal Aunt     No Known Problems Paternal Aunt     No Known Problems Paternal Aunt     No Known Problems Paternal Aunt     No Known Problems Paternal Aunt     No Known Problems Paternal Aunt        Social History:  Social History     Socioeconomic History    Marital status: /Civil Union     Spouse name: Not on file    Number of children: Not on file    Years of education: Not on file    Highest education level: Not on file   Occupational History    Not on file   Social Needs    Financial resource strain: Not on file    Food insecurity:     Worry: Not on file     Inability: Not on file    Transportation needs:     Medical: Not on file     Non-medical: Not on file   Tobacco Use    Smoking status: Never Smoker    Smokeless tobacco: Never Used   Substance and Sexual Activity    Alcohol use: No    Drug use: No    Sexual activity: Not on file   Lifestyle    Physical activity:     Days per week: Not on file     Minutes per session: Not on file    Stress: Not on file   Relationships    Social connections:     Talks on phone: Not on file     Gets together: Not on file     Attends Congregation service: Not on file     Active member of club or organization: Not on file     Attends meetings of clubs or organizations: Not on file     Relationship status: Not on file    Intimate partner violence:     Fear of current or ex partner: Not on file     Emotionally abused: Not on file     Physically abused: Not on file     Forced sexual activity: Not on file   Other Topics Concern    Not on file   Social History Narrative    Caffeine use    Description: Tea 1 cup     I have reviewed the patient's medical history in detail; there are no changes to the history as noted in the electronic medical record      Objective     Vitals:    03/05/20 0734   BP: 100/70   Pulse: 87   Resp: 18   Temp: (!) 97 2 °F (36 2 °C)   SpO2: 98%     Wt Readings from Last 3 Encounters:   02/26/20 79 4 kg (175 lb)   01/21/20 78 9 kg (174 lb)   01/13/20 79 kg (174 lb 4 oz)       Physical Exam   Constitutional: She is oriented to person, place, and time  She appears well-developed and well-nourished  HENT:   Head: Normocephalic and atraumatic  Mouth/Throat: Oropharynx is clear and moist    Eyes: Pupils are equal, round, and reactive to light  Conjunctivae and EOM are normal    Neck: Normal range of motion  Neck supple  No thyromegaly present  Cardiovascular: Normal rate, regular rhythm and normal heart sounds  Pulmonary/Chest: Effort normal and breath sounds normal    Abdominal: Soft  Bowel sounds are normal    Musculoskeletal: Normal range of motion  She exhibits no edema  Tender to palpation of left low back, left hip  Motor strength 4+/5  Decreased sensation of left anterior thigh   Lymphadenopathy:     She has no cervical adenopathy  Neurological: She is alert and oriented to person, place, and time  Psychiatric: She has a normal mood and affect  Nursing note and vitals reviewed        Pertinent Laboratory/Diagnostic Studies:  Lab Results   Component Value Date    BUN 11 02/21/2020    CREATININE 0 56 (L) 02/21/2020    CALCIUM 9 2 02/21/2020    K 3 9 02/21/2020    CO2 29 02/21/2020     02/21/2020     Lab Results   Component Value Date    ALT 33 02/21/2020    AST 17 02/21/2020    ALKPHOS 64 02/21/2020       Lab Results   Component Value Date    WBC 9 26 03/22/2019    HGB 13 3 03/22/2019    HCT 38 6 03/22/2019    MCV 86 03/22/2019     03/22/2019       No results found for: TSH    No results found for: CHOL  Lab Results   Component Value Date    TRIG 327 (H) 02/21/2020     Lab Results   Component Value Date    HDL 40 02/21/2020     Lab Results   Component Value Date    LDLCALC 79 02/21/2020     Lab Results   Component Value Date    HGBA1C 6 7 (H) 02/21/2020       Results for orders placed or performed in visit on 02/21/20   Vitamin D 25 hydroxy   Result Value Ref Range    Vit D, 25-Hydroxy 30 4 30 0 - 100 0 ng/mL Hemoglobin A1C   Result Value Ref Range    Hemoglobin A1C 6 7 (H) Normal 3 8-5 6%; PreDiabetic 5 7-6 4%;  Diabetic >=6 5%; Glycemic control for adults with diabetes <7 0% %     mg/dl   Comprehensive metabolic panel   Result Value Ref Range    Sodium 137 136 - 145 mmol/L    Potassium 3 9 3 5 - 5 3 mmol/L    Chloride 105 100 - 108 mmol/L    CO2 29 21 - 32 mmol/L    ANION GAP 3 (L) 4 - 13 mmol/L    BUN 11 5 - 25 mg/dL    Creatinine 0 56 (L) 0 60 - 1 30 mg/dL    Glucose, Fasting 106 (H) 65 - 99 mg/dL    Calcium 9 2 8 3 - 10 1 mg/dL    AST 17 5 - 45 U/L    ALT 33 12 - 78 U/L    Alkaline Phosphatase 64 46 - 116 U/L    Total Protein 7 4 6 4 - 8 2 g/dL    Albumin 4 0 3 5 - 5 0 g/dL    Total Bilirubin 0 59 0 20 - 1 00 mg/dL    eGFR 112 ml/min/1 73sq m   Lipid Panel with Direct LDL reflex   Result Value Ref Range    Cholesterol 184 50 - 200 mg/dL    Triglycerides 327 (H) <=150 mg/dL    HDL, Direct 40 >=40 mg/dL    LDL Calculated 79 0 - 100 mg/dL       Orders Placed This Encounter   Procedures    Vitamin B12       ALLERGIES:  Allergies   Allergen Reactions    Bee Pollen Itching     Itchy, runny nose    Dust Mite Extract Itching     Itchy, runny nose    Pollen Extract Itching     Itchy, runny nose       Current Medications     Current Outpatient Medications   Medication Sig Dispense Refill    Cetirizine HCl (ZYRTEC ALLERGY PO) Take 1 tablet by mouth as needed       cyanocobalamin (VITAMIN B-12) 1,000 mcg tablet Take 2,000 mcg by mouth daily      diclofenac (VOLTAREN) 75 mg EC tablet Take 75 mg by mouth 2 (two) times a day      gabapentin (NEURONTIN) 100 mg capsule Take 3 capsules (300 mg total) by mouth daily at bedtime (Patient taking differently: Take 300 mg by mouth daily ) 90 capsule 1    tiZANidine (ZANAFLEX) 2 mg tablet 1 AT BEDTIME DAILY FOR NECK PAIN 90 tablet 0    acetaminophen (TYLENOL) 500 mg tablet Take 500 mg by mouth as needed        atorvastatin (LIPITOR) 20 mg tablet Take 1 tablet (20 mg total) by mouth daily at bedtime (Patient not taking: Reported on 3/5/2020) 90 tablet 3    ergocalciferol (VITAMIN D2) 50,000 units Take by mouth once a week      ibuprofen (MOTRIN) 600 mg tablet Take 1 tablet (600 mg total) by mouth every 6 (six) hours as needed for moderate pain (Patient not taking: Reported on 3/5/2020) 30 tablet 0    metFORMIN (GLUCOPHAGE) 500 mg tablet TAKE 1 TABLET EVERY DAY WITH A MEAL (Patient not taking: Reported on 3/5/2020) 30 tablet 5     No current facility-administered medications for this visit            Health Maintenance     Health Maintenance   Topic Date Due    Pneumococcal Vaccine: Pediatrics (0 to 5 Years) and At-Risk Patients (6 to 59 Years) (1 of 1 - PPSV23) 01/03/1980    HIV Screening  01/03/1989    BMI: Followup Plan  01/03/1992    Annual Physical  05/18/2019    Influenza Vaccine  07/01/2019    PT PLAN OF CARE  03/07/2020    Depression Screening PHQ  02/06/2021    BMI: Adult  02/26/2021    MAMMOGRAM  07/15/2021    DTaP,Tdap,and Td Vaccines (3 - Td) 03/09/2026    Pneumococcal Vaccine: 65+ Years (1 of 2 - PCV13) 01/03/2039    HIB Vaccine  Aged Out    Hepatitis B Vaccine  Aged Out    IPV Vaccine  Aged Out    Hepatitis A Vaccine  Aged Out    Meningococcal ACWY Vaccine  Aged Out    HPV Vaccine  Aged [employer] History   Administered Date(s) Administered    Fluzone Split Quad 0 5 mL 11/09/2015    INFLUENZA 11/09/2015, 11/01/2018    Td (adult), adsorbed 03/09/2016    Tdap 01/01/2016       Arminda Ace MD

## 2020-03-06 ENCOUNTER — OFFICE VISIT (OUTPATIENT)
Dept: PHYSICAL THERAPY | Facility: REHABILITATION | Age: 45
End: 2020-03-06
Payer: OTHER MISCELLANEOUS

## 2020-03-06 DIAGNOSIS — M25.562 ACUTE PAIN OF LEFT KNEE: Primary | ICD-10-CM

## 2020-03-06 PROCEDURE — 97110 THERAPEUTIC EXERCISES: CPT | Performed by: PHYSICAL THERAPIST

## 2020-03-06 PROCEDURE — 97112 NEUROMUSCULAR REEDUCATION: CPT | Performed by: PHYSICAL THERAPIST

## 2020-03-06 NOTE — PROGRESS NOTES
Daily Note     Today's date: 3/6/2020  Patient name: Hafsa Falk  : 1975  MRN: [id number]  Referring provider: Guero Wiseman MD  Dx:   Encounter Diagnosis     ICD-10-CM    1  Acute pain of left knee M25 562        Start Time: 716  Stop Time: 0800  Total time in clinic (min): 44 minutes    Subjective: Pt states the knee feels better when she doesn't move it  States her pain is a 7/10 this morning but feels it is getting better  Objective: See treatment diary below  Pt arrived 15 min late today    Assessment: Fair tolerance to tx  Trialed SLR flexion but pt unable to lift the LLE more than a few inches off mat  Pt concerned that work comp thinks pt missed therapy appts  I spoke with pts primary PT  Pt was placed on hold and told to follow up with her doc due to poor tolerance to activity  Pt did not miss any therapy appts  Pt very pain focused  Instructed pt it is ok to push through muscle fatigue an soreness, but to notify PT if she gets sharp or severe pain  Plan: Continue per plan of care        Precautions: DM11 standard     Manual   3/4 3/6         LUE PROM  LB cound not tolerate much LB LLE AF          STM quad  LB decreased tolerance  LB AF          Pat mobs              Prone quad stretch             Gr 1 oscillations - tib/fem joint             Gr 2 post glide tibia                                       Laser 13     NV             Exercise Diary   3/4 3/6         rec bike  nv 10 10 10'         Slant board  nv 3x30 seated GS          Heel slides  2x10 3x10 AAROM 3x10 AAROM          Quad sets  10x 10x 3"x10 5"x20         Hamstring stretch  nv LB manual  Af to bhupinder          SAQ  10x 10x 5"x10 5"x20         LAQ  8x 5x 10 x 5'' 5"x20         SLR flex    attempted                                                                                                                                                                         Modalities

## 2020-03-10 ENCOUNTER — OFFICE VISIT (OUTPATIENT)
Dept: PHYSICAL THERAPY | Facility: REHABILITATION | Age: 45
End: 2020-03-10
Payer: OTHER MISCELLANEOUS

## 2020-03-10 DIAGNOSIS — M25.562 ACUTE PAIN OF LEFT KNEE: Primary | ICD-10-CM

## 2020-03-10 PROCEDURE — 97110 THERAPEUTIC EXERCISES: CPT | Performed by: PHYSICAL THERAPIST

## 2020-03-10 PROCEDURE — 97140 MANUAL THERAPY 1/> REGIONS: CPT | Performed by: PHYSICAL THERAPIST

## 2020-03-10 NOTE — PROGRESS NOTES
Daily Note     Today's date: 3/10/2020  Patient name: Daniela Youssef  : 1975  MRN: [id number]  Referring provider: Viviane Viveros MD  Dx:   Encounter Diagnosis     ICD-10-CM    1  Acute pain of left knee M25 562        Start Time: 830  Stop Time: 930  Total time in clinic (min): 60 minutes    Subjective: Pt has been taking the new medication b i d  She feels it has been helping with pain control, but she has been experiencing side effects of diarrhea  Objective: See treatment diary below  Pt was able to perform 5x5 SLR flexion with good form  Pt presents to the clinic ambulating with one crutch  Palpation:  Increased tissue texture density of the distal quad  Assessment: Tolerated treatment well  Patient would benefit from continued PT    Plan: Continue per plan of care        Precautions: DM11 standard     Manual  2/12 02/21 3/4 3/6 03/10        LUE PROM  LB cound not tolerate much LB LLE AF          STM quad  LB decreased tolerance  LB AF  LMR - distal        Pat mobs              Prone quad stretch     LMR        Gr 1 oscillations - tib/fem joint             Gr 2 post glide tibia                                       Laser 13     NV             Exercise Diary  2/6  2/21 3/4 3/6 03/10        rec bike  nv 10 10 10' 15'        Slant board  nv 3x30 seated GS          Heel slides  2x10 3x10 AAROM 3x10 AAROM          Quad sets  10x 10x 3"x10 5"x20 x15        Hamstring stretch  nv LB manual  Af to bhupinder          SAQ  10x 10x 5"x10 5"x20         LAQ  8x 5x 10 x 5'' 5"x20 2x15        SLR flex    attempted 5x5        VG - b/l     L7 - 5'                                                                                                                                                           Modalities

## 2020-03-11 ENCOUNTER — OFFICE VISIT (OUTPATIENT)
Dept: PHYSICAL THERAPY | Facility: REHABILITATION | Age: 45
End: 2020-03-11
Payer: OTHER MISCELLANEOUS

## 2020-03-11 DIAGNOSIS — M25.562 ACUTE PAIN OF LEFT KNEE: Primary | ICD-10-CM

## 2020-03-11 PROCEDURE — 97110 THERAPEUTIC EXERCISES: CPT

## 2020-03-11 PROCEDURE — 97140 MANUAL THERAPY 1/> REGIONS: CPT

## 2020-03-11 NOTE — PROGRESS NOTES
Daily Note     Today's date: 3/11/2020  Patient name: Severino Virk  : 1975  MRN: [id number]  Referring provider: Huber Hernandez MD  Dx:   Encounter Diagnosis     ICD-10-CM    1  Acute pain of left knee M25 562        Start Time: 805  Stop Time: 09  Total time in clinic (min): 55 minutes    Subjective: Pt reports she felt fine following her treatment yesterday  Feels like her L knee is getting better, and has been able to function a little bit more normally than before  Continues to have pain in L knee as well as L hip  States she had imaging done yesterday on both L knee and hip that did not show any pathology to cause her symptoms  Is having bone density test done next  Objective: See treatment diary below  Pt advised by physical therapist, Mando De La Fuente, to reduce usage of cane to 50% throughout her day  Assessment: Tolerated treatment fair  Presents with moderate soft tissue restriction and increased tissue density along L dist quad, as well as ITB  These restrictions began to resolve during manual STM performed  Attempted supine SLR this visit but was only able to complete a few reps d/t limitations in strength  Pt reported increased numbness radiating down LLE to knee, along lateral thigh, while performing QS  This numbness resolved with hooklying positioning and rest   Patient would benefit from continued PT to further improve strength and decrease pain  Plan: Continue per plan of care        Precautions: DM11 standard     Manual  2/12 02/21 3/4 3/6 03/10 3/11       LUE PROM  LB cound not tolerate much LB LLE AF          STM quad  LB decreased tolerance  LB AF  LMR - distal AFB - distal  + ITB       Pat mobs              Prone quad stretch     LMR AFB       Gr 1 oscillations - tib/fem joint             Gr 2 post glide tibia                                       Laser 13     NV             Exercise Diary  2/6  2/21 3/4 3/6 03/10 3/11       rec bike  nv 10 10 10' 15' 10'       Slant board  nv 3x30 seated GS          Heel slides  2x10 3x10 AAROM 3x10 AAROM          Quad sets  10x 10x 3"x10 5"x20 x15 5"x17       Hamstring stretch  nv LB manual  Af to bhupinder          SAQ  10x 10x 5"x10 5"x20         LAQ  8x 5x 10 x 5'' 5"x20 2x15 2x15       SLR flex    attempted 5x5 x3        VG - b/l     L7 - 5' L7 - 5'       Gait training w/o cane      50' x 1                                                                                                                                             Modalities

## 2020-03-12 ENCOUNTER — OFFICE VISIT (OUTPATIENT)
Dept: PHYSICAL THERAPY | Facility: REHABILITATION | Age: 45
End: 2020-03-12
Payer: OTHER MISCELLANEOUS

## 2020-03-12 DIAGNOSIS — M25.562 ACUTE PAIN OF LEFT KNEE: Primary | ICD-10-CM

## 2020-03-12 PROCEDURE — 97110 THERAPEUTIC EXERCISES: CPT

## 2020-03-12 PROCEDURE — 97112 NEUROMUSCULAR REEDUCATION: CPT

## 2020-03-12 PROCEDURE — 97140 MANUAL THERAPY 1/> REGIONS: CPT

## 2020-03-12 NOTE — PROGRESS NOTES
Daily Note     Today's date: 3/12/2020  Patient name: Nathaly Emerson  : 1975  MRN: [id number]  Referring provider: Eva Hunter MD  Dx:   Encounter Diagnosis     ICD-10-CM    1  Acute pain of left knee M25 562        Start Time:   Stop Time: 935  Total time in clinic (min): 48 minutes    Subjective: Pt reports she had significant increase in pain yesterday afternoon around 1600 in both knees, after being on her feet in the kitchen for extended period of time  Pain tends to start in her knees than slowly intensify, radiating up lateral hip and sometimes into groin/ant hip  Objective: See treatment diary below  Educated pt on referred pain and possibility of increased restrictions higher along length of musculature could be causing pain at knee, but pt continues to ask for focused massage at site of pain  Assessment: Tolerated treatment fair  Pt did not tolerate bike well this visit, reporting gradual increase of pain in both knees and hips while performing this exercise  Held VG this visit in effort to not further aggravate symptoms  Focus primarily on table exercises to further strength L quad musculature  Continues to present with moderate soft tissue restriction throughout entire L quad and ITB  May benefit from Biodex wt shifting activities to begin bearing more weight through LLE  Patient would benefit from continued PT      Plan: Continue per plan of care  Consider Biodex wt shifting NV         Precautions: DM11 standard     Manual  2/12 02/21 3/4 3/6 03/10 3/11 3/12      LUE PROM  LB cound not tolerate much LB LLE AF          STM quad  LB decreased tolerance  LB AF  LMR - distal AFB - distal  + ITB AFB - distal      Pat mobs              Prone quad stretch     LMR AFB AFB      Gr 1 oscillations - tib/fem joint             Gr 2 post glide tibia                                       Laser 13     NV             Exercise Diary  2/6  2/21 3/4 3/6 03/10 3/11 3/12      rec bike  nv 10 10 10' 15' 10' 10'      Slant board  nv 3x30 seated GS          Heel slides  2x10 3x10 AAROM 3x10 AAROM          Quad sets  10x 10x 3"x10 5"x20 x15 5"x17 5"x20      Hamstring stretch  nv LB manual  Af to bhupinder          SAQ  10x 10x 5"x10 5"x20   2#  2x10      LAQ  8x 5x 10 x 5'' 5"x20 2x15 2x15 2x15      SLR flex    attempted 5x5 x3        VG - b/l     L7 - 5' L7 - 5'       Gait training w/o cane      50' x 1                                                                                                                                             Modalities

## 2020-03-17 ENCOUNTER — OFFICE VISIT (OUTPATIENT)
Dept: PHYSICAL THERAPY | Facility: REHABILITATION | Age: 45
End: 2020-03-17
Payer: OTHER MISCELLANEOUS

## 2020-03-17 DIAGNOSIS — M25.562 ACUTE PAIN OF LEFT KNEE: Primary | ICD-10-CM

## 2020-03-17 PROCEDURE — 97112 NEUROMUSCULAR REEDUCATION: CPT | Performed by: PHYSICAL THERAPIST

## 2020-03-17 PROCEDURE — 97110 THERAPEUTIC EXERCISES: CPT | Performed by: PHYSICAL THERAPIST

## 2020-03-17 PROCEDURE — 97140 MANUAL THERAPY 1/> REGIONS: CPT | Performed by: PHYSICAL THERAPIST

## 2020-03-17 NOTE — PROGRESS NOTES
Daily Note     Today's date: 3/17/2020  Patient name: Falguni Blunt  : 1975  MRN: [id number]  Referring provider: Tori Hodgson MD  Dx:   Encounter Diagnosis     ICD-10-CM    1  Acute pain of left knee M25 562        Start Time: 1050  Stop Time: 1148  Total time in clinic (min): 58 minutes    Subjective: I am getting a bone scan tomorrow       Objective: See treatment diary below      Assessment: Tolerated treatment well  Increased pts WB and  use of single crutch to tolerance  Add increased standing and strengthening exercises today tolerated well with frequent rest periods  Patient demonstrated fatigue post treatment, exhibited good technique with therapeutic exercises and would benefit from continued PT      Plan: Continue per plan of care  Progress treatment as tolerated         Precautions: DM11 standard     Manual  2/12 02/21 3/4 3/6 03/10 3/11 3/12 3/17     LUE PROM  LB cound not tolerate much LB LLE AF     LB     STM quad  LB decreased tolerance  LB AF  LMR - distal AFB - distal  + ITB AFB - distal      Pat mobs              Prone quad stretch     LMR AFB AFB LB     Gr 1 oscillations - tib/fem joint             Gr 2 post glide tibia                                       Laser 13     NV             Exercise Diary  2/6  2/21 3/4 3/6 03/10 3/11 3/12 3/17     rec bike  nv 10 10 10' 15' 10' 10' 10'     Slant board  nv 3x30 seated GS     3x30     Heel slides  2x10 3x10 AAROM 3x10 AAROM          Quad sets  10x 10x 3"x10 5"x20 x15 5"x17 5"x20 5" x10     Hamstring stretch  nv LB manual  Af to bhupinder          SAQ  10x 10x 5"x10 5"x20   2#  2x10      LAQ  8x 5x 10 x 5'' 5"x20 2x15 2x15 2x15 2x10     SLR flex    attempted 5x5 x3   2x6     VG - b/l     L7 - 5' L7 - 5'  L5 8 min      Gait training w/o cane      50' x 1       TKE         GTB 2x20                                                                                                                               Modalities

## 2020-03-18 ENCOUNTER — OFFICE VISIT (OUTPATIENT)
Dept: PHYSICAL THERAPY | Facility: REHABILITATION | Age: 45
End: 2020-03-18
Payer: OTHER MISCELLANEOUS

## 2020-03-18 DIAGNOSIS — M25.562 ACUTE PAIN OF LEFT KNEE: Primary | ICD-10-CM

## 2020-03-18 PROCEDURE — 97140 MANUAL THERAPY 1/> REGIONS: CPT | Performed by: PHYSICAL THERAPIST

## 2020-03-18 PROCEDURE — 97112 NEUROMUSCULAR REEDUCATION: CPT | Performed by: PHYSICAL THERAPIST

## 2020-03-18 PROCEDURE — 97110 THERAPEUTIC EXERCISES: CPT | Performed by: PHYSICAL THERAPIST

## 2020-03-18 NOTE — PROGRESS NOTES
Daily Note     Today's date: 3/18/2020  Patient name: Tanika Danielson  : 1975  MRN: [id number]  Referring provider: Warren Sow MD  Dx:   Encounter Diagnosis     ICD-10-CM    1  Acute pain of left knee M25 562                   Subjective: Pt reports that she feels about the same  She was sore following yesterdays visit  States she can walk short distances but is unable to go long distances  Complains of lateral hip pain  Also complains of anterior ankle pain  Objective: See treatment diary below      Assessment: Tolerated treatment well  She presents with significant lateral and posterior hip muscle tightness on the L side  She had no complaints with supine PROM but had only fair tolerance to prone quad stretch  Patient demonstrated fatigue post treatment, exhibited good technique with therapeutic exercises and would benefit from continued PT      Plan: Continue per plan of care  Progress treatment as tolerated         Precautions: DM11 standard     Manual  2/12 02/21 3/4 3/6 03/10 3/11 3/12 3/17 3/18    LUE PROM  LB cound not tolerate much LB LLE AF     LB EDITH    STM quad  LB decreased tolerance  LB AF  LMR - distal AFB - distal  + ITB AFB - distal      Pat mobs              Prone quad stretch     LMR AFB AFB LB EDITH    Gr 1 oscillations - tib/fem joint             Gr 2 post glide tibia             posterior muscle release         EDITH                 Laser 13     NV             Exercise Diary  2/6  2/21 3/4 3/6 03/10 3/11 3/12 3/17 3/18    rec bike  nv 10 10 10' 15' 10' 10' 10' 10'    Slant board  nv 3x30 seated GS     3x30     Heel slides  2x10 3x10 AAROM 3x10 AAROM          Quad sets  10x 10x 3"x10 5"x20 x15 5"x17 5"x20 5" x10 5" x10    Hamstring stretch  nv LB manual  Af to bhupinder          SAQ  10x 10x 5"x10 5"x20   2#  2x10      LAQ  8x 5x 10 x 5'' 5"x20 2x15 2x15 2x15 2x10 2x15    SLR flex    attempted 5x5 x3   2x6 4    VG - b/l     L7 - 5' L7 - 5'  L5 8 min      Gait training w/o cane      50' x 1       TKE         GTB 2x20  GTB 2x20                                                                                                                             Modalities

## 2020-03-19 ENCOUNTER — OFFICE VISIT (OUTPATIENT)
Dept: PHYSICAL THERAPY | Facility: REHABILITATION | Age: 45
End: 2020-03-19
Payer: OTHER MISCELLANEOUS

## 2020-03-19 DIAGNOSIS — M25.562 ACUTE PAIN OF LEFT KNEE: Primary | ICD-10-CM

## 2020-03-19 DIAGNOSIS — E11.9 TYPE 2 DIABETES MELLITUS WITHOUT COMPLICATION, WITHOUT LONG-TERM CURRENT USE OF INSULIN (HCC): ICD-10-CM

## 2020-03-19 PROCEDURE — 97110 THERAPEUTIC EXERCISES: CPT | Performed by: PHYSICAL THERAPIST

## 2020-03-19 PROCEDURE — 97140 MANUAL THERAPY 1/> REGIONS: CPT | Performed by: PHYSICAL THERAPIST

## 2020-03-19 NOTE — TELEPHONE ENCOUNTER
She may see Dr Amando Schmitt   Can you please provide her with number  He is part of [employer]  I can refill the diclofenac however she can only take it as needed as it is not good for her stomach lining  It Can cause bleeding  She must take it with food    Thank you

## 2020-03-19 NOTE — TELEPHONE ENCOUNTER
Patient would like to know what Orthopedic Dr she could go to  She was DC today from [facility] and on full duty and she is still weak and would like to k ow who you would recommend for her? Patient is taking Diclofenac 75 mg and wanted to see if she should continue this and if you could fill it for her since she is discharged from [place] [address] and [facility] for her R Marlyn Saunders 22 injusy they cannot find anything wrong with her     Please call to advise

## 2020-03-24 ENCOUNTER — APPOINTMENT (OUTPATIENT)
Dept: PHYSICAL THERAPY | Facility: REHABILITATION | Age: 45
End: 2020-03-24
Payer: OTHER MISCELLANEOUS

## 2020-03-25 ENCOUNTER — APPOINTMENT (OUTPATIENT)
Dept: PHYSICAL THERAPY | Facility: REHABILITATION | Age: 45
End: 2020-03-25
Payer: OTHER MISCELLANEOUS

## 2020-03-26 ENCOUNTER — APPOINTMENT (OUTPATIENT)
Dept: PHYSICAL THERAPY | Facility: REHABILITATION | Age: 45
End: 2020-03-26
Payer: OTHER MISCELLANEOUS

## 2020-03-26 NOTE — PROGRESS NOTES
Pt phoned in to report she is to report back to work normal duties and discharged from PT per MD Emmanuel Mckenna score per telephone

## 2020-03-31 ENCOUNTER — APPOINTMENT (OUTPATIENT)
Dept: PHYSICAL THERAPY | Facility: REHABILITATION | Age: 45
End: 2020-03-31
Payer: OTHER MISCELLANEOUS

## 2020-04-02 RX ORDER — ERGOCALCIFEROL 1.25 MG/1
CAPSULE ORAL
Qty: 12 CAPSULE | Refills: 0 | OUTPATIENT
Start: 2020-04-02

## 2020-04-03 DIAGNOSIS — M54.12 CERVICAL RADICULOPATHY: ICD-10-CM

## 2020-04-03 RX ORDER — TIZANIDINE 2 MG/1
TABLET ORAL
Qty: 90 TABLET | Refills: 2 | Status: SHIPPED | OUTPATIENT
Start: 2020-04-03 | End: 2020-04-30 | Stop reason: ALTCHOICE

## 2020-04-30 ENCOUNTER — TELEMEDICINE (OUTPATIENT)
Dept: FAMILY MEDICINE CLINIC | Facility: CLINIC | Age: 45
End: 2020-04-30
Payer: COMMERCIAL

## 2020-04-30 VITALS — HEIGHT: 66 IN | WEIGHT: 176 LBS | BODY MASS INDEX: 28.28 KG/M2

## 2020-04-30 DIAGNOSIS — B34.9 VIRAL SYNDROME: Primary | ICD-10-CM

## 2020-04-30 DIAGNOSIS — Z20.828 EXPOSURE TO SARS-ASSOCIATED CORONAVIRUS: ICD-10-CM

## 2020-04-30 PROCEDURE — 99212 OFFICE O/P EST SF 10 MIN: CPT | Performed by: FAMILY MEDICINE

## 2020-05-01 DIAGNOSIS — Z20.828 EXPOSURE TO SARS-ASSOCIATED CORONAVIRUS: ICD-10-CM

## 2020-05-01 PROCEDURE — U0003 INFECTIOUS AGENT DETECTION BY NUCLEIC ACID (DNA OR RNA); SEVERE ACUTE RESPIRATORY SYNDROME CORONAVIRUS 2 (SARS-COV-2) (CORONAVIRUS DISEASE [COVID-19]), AMPLIFIED PROBE TECHNIQUE, MAKING USE OF HIGH THROUGHPUT TECHNOLOGIES AS DESCRIBED BY CMS-2020-01-R: HCPCS

## 2020-05-02 ENCOUNTER — TELEPHONE (OUTPATIENT)
Dept: OTHER | Facility: OTHER | Age: 45
End: 2020-05-02

## 2020-05-02 LAB — SARS-COV-2 RNA SPEC QL NAA+PROBE: NOT DETECTED

## 2020-05-04 ENCOUNTER — TELEPHONE (OUTPATIENT)
Dept: FAMILY MEDICINE CLINIC | Facility: CLINIC | Age: 45
End: 2020-05-04

## 2020-06-29 ENCOUNTER — OFFICE VISIT (OUTPATIENT)
Dept: OBGYN CLINIC | Facility: CLINIC | Age: 45
End: 2020-06-29
Payer: COMMERCIAL

## 2020-06-29 VITALS
HEIGHT: 66 IN | SYSTOLIC BLOOD PRESSURE: 120 MMHG | DIASTOLIC BLOOD PRESSURE: 75 MMHG | HEART RATE: 90 BPM | WEIGHT: 176 LBS | BODY MASS INDEX: 28.28 KG/M2

## 2020-06-29 DIAGNOSIS — M25.559 HIP PAIN: ICD-10-CM

## 2020-06-29 DIAGNOSIS — G89.29 CHRONIC PAIN OF LEFT KNEE: Primary | ICD-10-CM

## 2020-06-29 DIAGNOSIS — M25.562 CHRONIC PAIN OF LEFT KNEE: Primary | ICD-10-CM

## 2020-06-29 PROCEDURE — 99203 OFFICE O/P NEW LOW 30 MIN: CPT | Performed by: PHYSICAL MEDICINE & REHABILITATION

## 2020-06-29 PROCEDURE — 3044F HG A1C LEVEL LT 7.0%: CPT | Performed by: PHYSICAL MEDICINE & REHABILITATION

## 2020-06-29 PROCEDURE — 20610 DRAIN/INJ JOINT/BURSA W/O US: CPT | Performed by: PHYSICAL MEDICINE & REHABILITATION

## 2020-06-29 PROCEDURE — 1036F TOBACCO NON-USER: CPT | Performed by: PHYSICAL MEDICINE & REHABILITATION

## 2020-06-29 PROCEDURE — 3008F BODY MASS INDEX DOCD: CPT | Performed by: PHYSICAL MEDICINE & REHABILITATION

## 2020-06-29 RX ORDER — TRIAMCINOLONE ACETONIDE 40 MG/ML
40 INJECTION, SUSPENSION INTRA-ARTICULAR; INTRAMUSCULAR
Status: COMPLETED | OUTPATIENT
Start: 2020-06-29 | End: 2020-06-29

## 2020-06-29 RX ORDER — LIDOCAINE HYDROCHLORIDE 10 MG/ML
5 INJECTION, SOLUTION INFILTRATION; PERINEURAL
Status: COMPLETED | OUTPATIENT
Start: 2020-06-29 | End: 2020-06-29

## 2020-06-29 RX ADMIN — TRIAMCINOLONE ACETONIDE 40 MG: 40 INJECTION, SUSPENSION INTRA-ARTICULAR; INTRAMUSCULAR at 09:46

## 2020-06-29 RX ADMIN — LIDOCAINE HYDROCHLORIDE 5 ML: 10 INJECTION, SOLUTION INFILTRATION; PERINEURAL at 09:46

## 2020-08-26 ENCOUNTER — OFFICE VISIT (OUTPATIENT)
Dept: OBGYN CLINIC | Facility: CLINIC | Age: 45
End: 2020-08-26
Payer: COMMERCIAL

## 2020-08-26 VITALS
DIASTOLIC BLOOD PRESSURE: 73 MMHG | SYSTOLIC BLOOD PRESSURE: 108 MMHG | BODY MASS INDEX: 28.61 KG/M2 | WEIGHT: 178 LBS | HEART RATE: 76 BPM | HEIGHT: 66 IN

## 2020-08-26 DIAGNOSIS — G89.29 CHRONIC PAIN OF LEFT KNEE: ICD-10-CM

## 2020-08-26 DIAGNOSIS — M25.562 CHRONIC PAIN OF LEFT KNEE: ICD-10-CM

## 2020-08-26 DIAGNOSIS — M25.559 HIP PAIN: ICD-10-CM

## 2020-08-26 DIAGNOSIS — M25.552 GREATER TROCHANTERIC PAIN SYNDROME OF LEFT LOWER EXTREMITY: Primary | ICD-10-CM

## 2020-08-26 PROCEDURE — 3044F HG A1C LEVEL LT 7.0%: CPT | Performed by: PHYSICAL MEDICINE & REHABILITATION

## 2020-08-26 PROCEDURE — 3008F BODY MASS INDEX DOCD: CPT | Performed by: PHYSICAL MEDICINE & REHABILITATION

## 2020-08-26 PROCEDURE — 99213 OFFICE O/P EST LOW 20 MIN: CPT | Performed by: PHYSICAL MEDICINE & REHABILITATION

## 2020-08-26 PROCEDURE — 1036F TOBACCO NON-USER: CPT | Performed by: PHYSICAL MEDICINE & REHABILITATION

## 2020-08-26 NOTE — PROGRESS NOTES
1  Greater trochanteric pain syndrome of left lower extremity     2  Chronic pain of left knee     3  Hip pain       No orders of the defined types were placed in this encounter  Imaging Studies (I personally reviewed images in PACS and report if it was available): Outside facility bone scan and MRI report of the left knee reviewed      Impression:  The patient's pain is multifactorial and is predominantly due to left patellofemoral pain syndrome and left greater trochanteric pain syndrome  We discussed different treatment options and decided to proceed with a steroid injection into her knee on her last visit and she is doing well with this  She rarely gets pain along the lateral hip but it is not severe  We discussed different treatment options and decided to hold off on any type of injection today  She can try over-the-counter patches as we discussed  She is doing well enough that I will see her back if needed      Return if symptoms worsen or fail to improve  HPI:  Edvin Barron is a 54 y o  female  who presents in follow up  Here for   Chief Complaint   Patient presents with    Follow-up       Date of injury: Started this past February 2020 while at work  Trajectory of symptoms:  Overall, doing quite well  She has intermittent pain around the lateral hip and sometimes into the knee  Review of Systems   Constitutional: Positive for activity change  Negative for fever  HENT: Negative for sore throat  Eyes: Negative for visual disturbance  Respiratory: Negative for shortness of breath  Cardiovascular: Negative for chest pain  Gastrointestinal: Negative for abdominal pain  Endocrine: Negative for polydipsia  Genitourinary: Negative for difficulty urinating  Musculoskeletal: Positive for arthralgias  Skin: Negative for rash  Allergic/Immunologic: Negative for immunocompromised state  Neurological: Negative for numbness  Hematological: Does not bruise/bleed easily  Psychiatric/Behavioral: Negative for confusion  Following history reviewed and updated:  History reviewed  No pertinent past medical history  Past Surgical History:   Procedure Laterality Date     SECTION      CHOLECYSTECTOMY      TOTAL ABDOMINAL HYSTERECTOMY      Due to fibroids  Ovaries present     Social History   Social History     Substance and Sexual Activity   Alcohol Use No     Social History     Substance and Sexual Activity   Drug Use No     Social History     Tobacco Use   Smoking Status Never Smoker   Smokeless Tobacco Never Used     Family History   Problem Relation Age of Onset    Diabetes Mother     Hypertension Mother     Heart attack Mother     Stroke Father     No Known Problems Daughter     No Known Problems Maternal Grandmother     No Known Problems Maternal Grandfather     No Known Problems Paternal Grandmother     No Known Problems Paternal Grandfather     No Known Problems Maternal Aunt     No Known Problems Maternal Aunt     No Known Problems Maternal Aunt     No Known Problems Paternal Aunt     No Known Problems Paternal Aunt     No Known Problems Paternal Aunt     No Known Problems Paternal Aunt     No Known Problems Paternal Aunt      Allergies   Allergen Reactions    Bee Pollen Itching     Itchy, runny nose    Dust Mite Extract Itching     Itchy, runny nose    Pollen Extract Itching     Itchy, runny nose        Constitutional:  /73   Pulse 76   Ht 5' 6" (1 676 m)   Wt 80 7 kg (178 lb)   BMI 28 73 kg/m²    General: NAD  Eyes: Clear sclerae  ENT: No inflammation, lesion, or mass of lips  No tracheal deviation  Musculoskeletal: As mentioned below  Integumentary: No visible rashes or skin lesions  Pulmonary/Chest: Effort normal  No respiratory distress  Neuro: CN's grossly intact, ROLDAN  Psych: Normal affect and judgement  Vascular: WWP  Left Knee Exam     Muscle Strength   The patient has normal left knee strength      Range of Motion   The patient has normal left knee ROM  Other   Swelling: none  Effusion: no effusion present      Left Hip Exam     Tenderness   The patient is experiencing tenderness in the greater trochanter  Range of Motion   The patient has normal left hip ROM  Muscle Strength   The patient has normal left hip strength  Other   Erythema: absent  Scars: absent  Sensation: normal  Pulse: present             Procedures - none for this visit  Portions of the record may have been created with voice recognition software  Occasional wrong word or "sound a like" substitutions may have occurred due to the inherent limitations of voice recognition software  Read the chart carefully and recognize, using context, where substitutions have occurred

## 2020-08-26 NOTE — PATIENT INSTRUCTIONS
Warm baths with epsom salt can also help with muscle tightness/cramping  Epsom salt releases magnesium which can be helpful  Over-the-counter salonpas patches can be applied to the area of discomfort to help with pain  There are two types of patches; one contains lidocaine 4% and the other contains menthol, camphor and methyl salicylate  You can try one or the other and see which one works best for you

## 2020-09-12 NOTE — PROGRESS NOTES
Daily Note     Today's date: 3/19/2020  Patient name: Vivian Bland  : 1975  MRN: [id number]  Referring provider: Kay Najera MD  Dx:   Encounter Diagnosis     ICD-10-CM    1  Acute pain of left knee M25 562        Start Time: 0740  Stop Time: 0830  Total time in clinic (min): 50 minutes    Subjective:   "It's getting better, but it still hurts "     Objective: See treatment diary below  Pt used u/l crutch to ambulate from the car into the clinic, but was able to ambulate safely around the clinic without any issues  Assessment: Tolerated treatment fair  Pt requires encouragement to perform TE  Patient would benefit from continued PT    Plan: Continue per plan of care        Precautions: DM11 standard     Manual  2/12 02/21 3/4 3/6 03/10 3/11 3/12 3/17 3/18 03/19   LUE PROM  LB cound not tolerate much LB LLE AF     LB EDITH    STM quad  LB decreased tolerance  LB AF  LMR - distal AFB - distal  + ITB AFB - distal      Pat mobs              Prone quad stretch     LMR AFB AFB LB EDITH LMR   Gr 1 oscillations - tib/fem joint             Gr 2 post glide tibia             posterior muscle release         EDITH                 Laser 13     NV             Exercise Diary  2/6  2/21 3/4 3/6 03/10 3/11 3/12 3/17 3/18 03/19   rec bike  nv 10 10 10' 15' 10' 10' 10' 10' 13'   Slant board  nv 3x30 seated GS     3x30     Heel slides  2x10 3x10 AAROM 3x10 AAROM          Quad sets  10x 10x 3"x10 5"x20 x15 5"x17 5"x20 5" x10 5" x10 x15   Hamstring stretch  nv LB manual  Af to bhupinder          SAQ  10x 10x 5"x10 5"x20   2#  2x10      LAQ  8x 5x 10 x 5'' 5"x20 2x15 2x15 2x15 2x10 2x15    SLR flex    attempted 5x5 x3   2x6 4 2x10   VG - b/l     L7 - 5' L7 - 5'  L5 8 min      Gait training w/o cane      50' x 1    3'   TKE         GTB 2x20  GTB 2x20    Sit-to-stand - from plinth          5x5   Tandem walking          15 ft x 4 - CG Modalities 12-Sep-2020 23:26

## 2020-09-15 ENCOUNTER — TELEPHONE (OUTPATIENT)
Dept: FAMILY MEDICINE CLINIC | Facility: CLINIC | Age: 45
End: 2020-09-15

## 2020-09-15 DIAGNOSIS — Z12.31 SCREENING MAMMOGRAM, ENCOUNTER FOR: Primary | ICD-10-CM

## 2020-09-15 NOTE — TELEPHONE ENCOUNTER
Patient has an appt on October 2 for a follow up, she is requesting an order for a mammogram so she can schedule this now, is this okay to place? Please advise patient when ready

## 2020-10-01 ENCOUNTER — LAB (OUTPATIENT)
Dept: LAB | Facility: CLINIC | Age: 45
End: 2020-10-01
Payer: COMMERCIAL

## 2020-10-01 DIAGNOSIS — E53.8 VITAMIN B12 DEFICIENCY: ICD-10-CM

## 2020-10-01 DIAGNOSIS — E11.9 TYPE 2 DIABETES MELLITUS WITHOUT COMPLICATION, WITHOUT LONG-TERM CURRENT USE OF INSULIN (HCC): ICD-10-CM

## 2020-10-01 DIAGNOSIS — E78.1 HYPERTRIGLYCERIDEMIA: ICD-10-CM

## 2020-10-01 DIAGNOSIS — R53.83 FATIGUE, UNSPECIFIED TYPE: ICD-10-CM

## 2020-10-01 LAB
ALBUMIN SERPL BCP-MCNC: 3.8 G/DL (ref 3.5–5)
ALP SERPL-CCNC: 61 U/L (ref 46–116)
ALT SERPL W P-5'-P-CCNC: 26 U/L (ref 12–78)
ANION GAP SERPL CALCULATED.3IONS-SCNC: 4 MMOL/L (ref 4–13)
AST SERPL W P-5'-P-CCNC: 10 U/L (ref 5–45)
BASOPHILS # BLD AUTO: 0.07 THOUSANDS/ΜL (ref 0–0.1)
BASOPHILS NFR BLD AUTO: 1 % (ref 0–1)
BILIRUB SERPL-MCNC: 0.56 MG/DL (ref 0.2–1)
BUN SERPL-MCNC: 12 MG/DL (ref 5–25)
CALCIUM SERPL-MCNC: 9 MG/DL (ref 8.3–10.1)
CHLORIDE SERPL-SCNC: 108 MMOL/L (ref 100–108)
CHOLEST SERPL-MCNC: 199 MG/DL (ref 50–200)
CO2 SERPL-SCNC: 28 MMOL/L (ref 21–32)
CREAT SERPL-MCNC: 0.68 MG/DL (ref 0.6–1.3)
EOSINOPHIL # BLD AUTO: 0.5 THOUSAND/ΜL (ref 0–0.61)
EOSINOPHIL NFR BLD AUTO: 7 % (ref 0–6)
ERYTHROCYTE [DISTWIDTH] IN BLOOD BY AUTOMATED COUNT: 12 % (ref 11.6–15.1)
EST. AVERAGE GLUCOSE BLD GHB EST-MCNC: 128 MG/DL
GFR SERPL CREATININE-BSD FRML MDRD: 105 ML/MIN/1.73SQ M
GLUCOSE P FAST SERPL-MCNC: 100 MG/DL (ref 65–99)
HBA1C MFR BLD: 6.1 %
HCT VFR BLD AUTO: 40.8 % (ref 34.8–46.1)
HDLC SERPL-MCNC: 39 MG/DL
HGB BLD-MCNC: 14.2 G/DL (ref 11.5–15.4)
IMM GRANULOCYTES # BLD AUTO: 0.01 THOUSAND/UL (ref 0–0.2)
IMM GRANULOCYTES NFR BLD AUTO: 0 % (ref 0–2)
LDLC SERPL CALC-MCNC: 110 MG/DL (ref 0–100)
LYMPHOCYTES # BLD AUTO: 2.43 THOUSANDS/ΜL (ref 0.6–4.47)
LYMPHOCYTES NFR BLD AUTO: 33 % (ref 14–44)
MCH RBC QN AUTO: 30.5 PG (ref 26.8–34.3)
MCHC RBC AUTO-ENTMCNC: 34.8 G/DL (ref 31.4–37.4)
MCV RBC AUTO: 88 FL (ref 82–98)
MONOCYTES # BLD AUTO: 0.56 THOUSAND/ΜL (ref 0.17–1.22)
MONOCYTES NFR BLD AUTO: 8 % (ref 4–12)
NEUTROPHILS # BLD AUTO: 3.75 THOUSANDS/ΜL (ref 1.85–7.62)
NEUTS SEG NFR BLD AUTO: 51 % (ref 43–75)
NRBC BLD AUTO-RTO: 0 /100 WBCS
PLATELET # BLD AUTO: 265 THOUSANDS/UL (ref 149–390)
PMV BLD AUTO: 10.8 FL (ref 8.9–12.7)
POTASSIUM SERPL-SCNC: 3.9 MMOL/L (ref 3.5–5.3)
PROT SERPL-MCNC: 7.2 G/DL (ref 6.4–8.2)
RBC # BLD AUTO: 4.66 MILLION/UL (ref 3.81–5.12)
SODIUM SERPL-SCNC: 140 MMOL/L (ref 136–145)
TRIGL SERPL-MCNC: 248 MG/DL
TSH SERPL DL<=0.05 MIU/L-ACNC: 2.75 UIU/ML (ref 0.36–3.74)
VIT B12 SERPL-MCNC: 495 PG/ML (ref 100–900)
WBC # BLD AUTO: 7.32 THOUSAND/UL (ref 4.31–10.16)

## 2020-10-01 PROCEDURE — 3044F HG A1C LEVEL LT 7.0%: CPT | Performed by: NURSE PRACTITIONER

## 2020-10-01 PROCEDURE — 84443 ASSAY THYROID STIM HORMONE: CPT

## 2020-10-01 PROCEDURE — 80053 COMPREHEN METABOLIC PANEL: CPT

## 2020-10-01 PROCEDURE — 82607 VITAMIN B-12: CPT

## 2020-10-01 PROCEDURE — 36415 COLL VENOUS BLD VENIPUNCTURE: CPT

## 2020-10-01 PROCEDURE — 83036 HEMOGLOBIN GLYCOSYLATED A1C: CPT

## 2020-10-01 PROCEDURE — 85025 COMPLETE CBC W/AUTO DIFF WBC: CPT

## 2020-10-01 PROCEDURE — 80061 LIPID PANEL: CPT

## 2020-10-02 ENCOUNTER — OFFICE VISIT (OUTPATIENT)
Dept: FAMILY MEDICINE CLINIC | Facility: CLINIC | Age: 45
End: 2020-10-02
Payer: COMMERCIAL

## 2020-10-02 VITALS
TEMPERATURE: 97.8 F | DIASTOLIC BLOOD PRESSURE: 70 MMHG | RESPIRATION RATE: 16 BRPM | BODY MASS INDEX: 28.33 KG/M2 | WEIGHT: 176.25 LBS | SYSTOLIC BLOOD PRESSURE: 100 MMHG | OXYGEN SATURATION: 97 % | HEART RATE: 76 BPM | HEIGHT: 66 IN

## 2020-10-02 DIAGNOSIS — E55.9 VITAMIN D DEFICIENCY: ICD-10-CM

## 2020-10-02 DIAGNOSIS — E53.8 VITAMIN B12 DEFICIENCY: ICD-10-CM

## 2020-10-02 DIAGNOSIS — D72.19 OTHER EOSINOPHILIA: ICD-10-CM

## 2020-10-02 DIAGNOSIS — R10.12 LUQ CRAMPING: ICD-10-CM

## 2020-10-02 DIAGNOSIS — E11.9 TYPE 2 DIABETES MELLITUS WITHOUT COMPLICATION, WITHOUT LONG-TERM CURRENT USE OF INSULIN (HCC): ICD-10-CM

## 2020-10-02 DIAGNOSIS — E78.1 HYPERTRIGLYCERIDEMIA: Primary | ICD-10-CM

## 2020-10-02 DIAGNOSIS — R51.9 NONINTRACTABLE EPISODIC HEADACHE, UNSPECIFIED HEADACHE TYPE: ICD-10-CM

## 2020-10-02 DIAGNOSIS — M79.18 MYOFASCIAL PAIN: ICD-10-CM

## 2020-10-02 DIAGNOSIS — L98.9 SKIN LESION: ICD-10-CM

## 2020-10-02 PROCEDURE — 99214 OFFICE O/P EST MOD 30 MIN: CPT | Performed by: FAMILY MEDICINE

## 2020-10-19 ENCOUNTER — OFFICE VISIT (OUTPATIENT)
Dept: PAIN MEDICINE | Facility: CLINIC | Age: 45
End: 2020-10-19
Payer: COMMERCIAL

## 2020-10-19 VITALS — TEMPERATURE: 97.9 F | BODY MASS INDEX: 28.93 KG/M2 | HEIGHT: 66 IN | WEIGHT: 180 LBS

## 2020-10-19 DIAGNOSIS — M50.30 DDD (DEGENERATIVE DISC DISEASE), CERVICAL: ICD-10-CM

## 2020-10-19 DIAGNOSIS — G89.29 CHRONIC LEFT SHOULDER PAIN: ICD-10-CM

## 2020-10-19 DIAGNOSIS — M54.2 NECK PAIN: ICD-10-CM

## 2020-10-19 DIAGNOSIS — M25.512 CHRONIC LEFT SHOULDER PAIN: ICD-10-CM

## 2020-10-19 DIAGNOSIS — M54.12 CERVICAL RADICULOPATHY: Primary | ICD-10-CM

## 2020-10-19 DIAGNOSIS — M47.812 SPONDYLOSIS OF CERVICAL SPINE WITHOUT MYELOPATHY: ICD-10-CM

## 2020-10-19 PROCEDURE — 1036F TOBACCO NON-USER: CPT | Performed by: NURSE PRACTITIONER

## 2020-10-19 PROCEDURE — 99214 OFFICE O/P EST MOD 30 MIN: CPT | Performed by: NURSE PRACTITIONER

## 2020-10-20 ENCOUNTER — TELEPHONE (OUTPATIENT)
Dept: RADIOLOGY | Facility: CLINIC | Age: 45
End: 2020-10-20

## 2020-11-02 ENCOUNTER — TELEMEDICINE (OUTPATIENT)
Dept: FAMILY MEDICINE CLINIC | Facility: CLINIC | Age: 45
End: 2020-11-02
Payer: COMMERCIAL

## 2020-11-02 DIAGNOSIS — B34.9 ACUTE VIRAL SYNDROME: ICD-10-CM

## 2020-11-02 DIAGNOSIS — Z20.822 CLOSE EXPOSURE TO COVID-19 VIRUS: ICD-10-CM

## 2020-11-02 DIAGNOSIS — Z20.822 CLOSE EXPOSURE TO COVID-19 VIRUS: Primary | ICD-10-CM

## 2020-11-02 PROCEDURE — 99213 OFFICE O/P EST LOW 20 MIN: CPT | Performed by: FAMILY MEDICINE

## 2020-11-02 PROCEDURE — U0003 INFECTIOUS AGENT DETECTION BY NUCLEIC ACID (DNA OR RNA); SEVERE ACUTE RESPIRATORY SYNDROME CORONAVIRUS 2 (SARS-COV-2) (CORONAVIRUS DISEASE [COVID-19]), AMPLIFIED PROBE TECHNIQUE, MAKING USE OF HIGH THROUGHPUT TECHNOLOGIES AS DESCRIBED BY CMS-2020-01-R: HCPCS | Performed by: FAMILY MEDICINE

## 2020-11-04 ENCOUNTER — TELEPHONE (OUTPATIENT)
Dept: FAMILY MEDICINE CLINIC | Facility: CLINIC | Age: 45
End: 2020-11-04

## 2020-11-04 LAB — SARS-COV-2 RNA SPEC QL NAA+PROBE: DETECTED

## 2020-11-06 ENCOUNTER — TELEMEDICINE (OUTPATIENT)
Dept: FAMILY MEDICINE CLINIC | Facility: CLINIC | Age: 45
End: 2020-11-06
Payer: COMMERCIAL

## 2020-11-06 DIAGNOSIS — U07.1 COVID-19 VIRUS INFECTION: Primary | ICD-10-CM

## 2020-11-06 PROCEDURE — 99213 OFFICE O/P EST LOW 20 MIN: CPT | Performed by: NURSE PRACTITIONER

## 2020-11-09 ENCOUNTER — TELEPHONE (OUTPATIENT)
Dept: FAMILY MEDICINE CLINIC | Facility: CLINIC | Age: 45
End: 2020-11-09

## 2020-11-09 ENCOUNTER — HOSPITAL ENCOUNTER (EMERGENCY)
Facility: HOSPITAL | Age: 45
Discharge: HOME | End: 2020-11-09
Attending: EMERGENCY MEDICINE
Payer: COMMERCIAL

## 2020-11-09 ENCOUNTER — APPOINTMENT (EMERGENCY)
Dept: RADIOLOGY | Facility: HOSPITAL | Age: 45
End: 2020-11-09
Payer: COMMERCIAL

## 2020-11-09 VITALS
WEIGHT: 178 LBS | DIASTOLIC BLOOD PRESSURE: 76 MMHG | HEART RATE: 85 BPM | TEMPERATURE: 98.4 F | OXYGEN SATURATION: 97 % | HEIGHT: 66 IN | BODY MASS INDEX: 28.61 KG/M2 | RESPIRATION RATE: 20 BRPM | SYSTOLIC BLOOD PRESSURE: 103 MMHG

## 2020-11-09 DIAGNOSIS — R07.81 RIB PAIN: ICD-10-CM

## 2020-11-09 DIAGNOSIS — R06.00 DYSPNEA: ICD-10-CM

## 2020-11-09 DIAGNOSIS — U07.1 COVID-19: Primary | ICD-10-CM

## 2020-11-09 PROCEDURE — 71045 X-RAY EXAM CHEST 1 VIEW: CPT

## 2020-11-09 PROCEDURE — 99284 EMERGENCY DEPT VISIT MOD MDM: CPT | Performed by: EMERGENCY MEDICINE

## 2020-11-09 PROCEDURE — 99284 EMERGENCY DEPT VISIT MOD MDM: CPT

## 2020-11-09 RX ORDER — IBUPROFEN 400 MG/1
400 TABLET ORAL ONCE
Status: COMPLETED | OUTPATIENT
Start: 2020-11-09 | End: 2020-11-09

## 2020-11-09 RX ORDER — ONDANSETRON 4 MG/1
4 TABLET, ORALLY DISINTEGRATING ORAL ONCE
Status: COMPLETED | OUTPATIENT
Start: 2020-11-09 | End: 2020-11-09

## 2020-11-09 RX ORDER — BENZONATATE 100 MG/1
100 CAPSULE ORAL EVERY 8 HOURS
Qty: 21 CAPSULE | Refills: 0 | Status: SHIPPED | OUTPATIENT
Start: 2020-11-09 | End: 2020-11-10

## 2020-11-09 RX ORDER — ONDANSETRON 4 MG/1
4 TABLET, ORALLY DISINTEGRATING ORAL EVERY 6 HOURS PRN
Qty: 20 TABLET | Refills: 0 | Status: SHIPPED | OUTPATIENT
Start: 2020-11-09 | End: 2021-08-25 | Stop reason: ALTCHOICE

## 2020-11-09 RX ORDER — BENZONATATE 100 MG/1
100 CAPSULE ORAL ONCE
Status: COMPLETED | OUTPATIENT
Start: 2020-11-09 | End: 2020-11-09

## 2020-11-09 RX ADMIN — BENZONATATE 100 MG: 100 CAPSULE ORAL at 16:09

## 2020-11-09 RX ADMIN — IBUPROFEN 400 MG: 400 TABLET ORAL at 16:09

## 2020-11-09 RX ADMIN — DEXAMETHASONE 10 MG: 2 TABLET ORAL at 16:09

## 2020-11-09 RX ADMIN — ONDANSETRON 4 MG: 4 TABLET, ORALLY DISINTEGRATING ORAL at 16:10

## 2020-11-10 ENCOUNTER — TELEMEDICINE (OUTPATIENT)
Dept: FAMILY MEDICINE CLINIC | Facility: CLINIC | Age: 45
End: 2020-11-10
Payer: COMMERCIAL

## 2020-11-10 DIAGNOSIS — R05.9 COUGH: ICD-10-CM

## 2020-11-10 DIAGNOSIS — U07.1 COVID-19 VIRUS INFECTION: Primary | ICD-10-CM

## 2020-11-10 DIAGNOSIS — R93.89 ABNORMAL CHEST X-RAY: ICD-10-CM

## 2020-11-10 PROCEDURE — 99212 OFFICE O/P EST SF 10 MIN: CPT | Performed by: FAMILY MEDICINE

## 2020-11-10 RX ORDER — BENZONATATE 200 MG/1
200 CAPSULE ORAL 3 TIMES DAILY PRN
Qty: 30 CAPSULE | Refills: 0 | Status: SHIPPED | OUTPATIENT
Start: 2020-11-10 | End: 2021-08-25 | Stop reason: ALTCHOICE

## 2020-11-10 RX ORDER — ALBUTEROL SULFATE 90 UG/1
2 AEROSOL, METERED RESPIRATORY (INHALATION) EVERY 6 HOURS PRN
Qty: 18 G | Refills: 0 | Status: SHIPPED | OUTPATIENT
Start: 2020-11-10 | End: 2021-08-25 | Stop reason: ALTCHOICE

## 2020-11-12 ENCOUNTER — TELEMEDICINE (OUTPATIENT)
Dept: FAMILY MEDICINE CLINIC | Facility: CLINIC | Age: 45
End: 2020-11-12
Payer: COMMERCIAL

## 2020-11-12 DIAGNOSIS — U07.1 COVID-19 VIRUS INFECTION: Primary | ICD-10-CM

## 2020-11-12 PROCEDURE — 99212 OFFICE O/P EST SF 10 MIN: CPT | Performed by: FAMILY MEDICINE

## 2020-11-14 ENCOUNTER — TELEPHONE (OUTPATIENT)
Dept: OTHER | Facility: OTHER | Age: 45
End: 2020-11-14

## 2020-11-15 ENCOUNTER — HOSPITAL ENCOUNTER (EMERGENCY)
Facility: HOSPITAL | Age: 45
Discharge: HOME | End: 2020-11-15
Attending: EMERGENCY MEDICINE | Admitting: EMERGENCY MEDICINE
Payer: COMMERCIAL

## 2020-11-15 VITALS
OXYGEN SATURATION: 97 % | TEMPERATURE: 97.8 F | HEART RATE: 85 BPM | BODY MASS INDEX: 28.61 KG/M2 | SYSTOLIC BLOOD PRESSURE: 126 MMHG | RESPIRATION RATE: 16 BRPM | DIASTOLIC BLOOD PRESSURE: 84 MMHG | HEIGHT: 66 IN | WEIGHT: 178 LBS

## 2020-11-15 DIAGNOSIS — M79.10 MYALGIA: ICD-10-CM

## 2020-11-15 DIAGNOSIS — R51.9 HEADACHE: Primary | ICD-10-CM

## 2020-11-15 DIAGNOSIS — J02.9 SORE THROAT: ICD-10-CM

## 2020-11-15 DIAGNOSIS — U07.1 COVID-19: ICD-10-CM

## 2020-11-15 DIAGNOSIS — R11.2 NAUSEA & VOMITING: ICD-10-CM

## 2020-11-15 PROBLEM — R05.9 COUGH: Status: ACTIVE | Noted: 2020-11-15

## 2020-11-15 PROBLEM — R93.89 ABNORMAL CHEST X-RAY: Status: ACTIVE | Noted: 2020-11-15

## 2020-11-15 PROCEDURE — 96361 HYDRATE IV INFUSION ADD-ON: CPT

## 2020-11-15 PROCEDURE — 96374 THER/PROPH/DIAG INJ IV PUSH: CPT

## 2020-11-15 PROCEDURE — 96375 TX/PRO/DX INJ NEW DRUG ADDON: CPT

## 2020-11-15 PROCEDURE — 99283 EMERGENCY DEPT VISIT LOW MDM: CPT

## 2020-11-15 PROCEDURE — 99284 EMERGENCY DEPT VISIT MOD MDM: CPT | Performed by: EMERGENCY MEDICINE

## 2020-11-15 RX ORDER — ONDANSETRON 4 MG/1
4 TABLET, ORALLY DISINTEGRATING ORAL EVERY 8 HOURS PRN
Qty: 20 TABLET | Refills: 0 | Status: SHIPPED | OUTPATIENT
Start: 2020-11-15 | End: 2021-08-25 | Stop reason: ALTCHOICE

## 2020-11-15 RX ORDER — METOCLOPRAMIDE HYDROCHLORIDE 5 MG/ML
10 INJECTION INTRAMUSCULAR; INTRAVENOUS ONCE
Status: COMPLETED | OUTPATIENT
Start: 2020-11-15 | End: 2020-11-15

## 2020-11-15 RX ORDER — DIPHENHYDRAMINE HYDROCHLORIDE 50 MG/ML
25 INJECTION INTRAMUSCULAR; INTRAVENOUS ONCE
Status: COMPLETED | OUTPATIENT
Start: 2020-11-15 | End: 2020-11-15

## 2020-11-15 RX ORDER — IBUPROFEN 800 MG/1
800 TABLET ORAL 3 TIMES DAILY
Qty: 21 TABLET | Refills: 0 | Status: SHIPPED | OUTPATIENT
Start: 2020-11-15 | End: 2021-10-25

## 2020-11-15 RX ORDER — LIDOCAINE HYDROCHLORIDE 20 MG/ML
15 SOLUTION OROPHARYNGEAL ONCE
Status: COMPLETED | OUTPATIENT
Start: 2020-11-15 | End: 2020-11-15

## 2020-11-15 RX ORDER — KETOROLAC TROMETHAMINE 30 MG/ML
15 INJECTION, SOLUTION INTRAMUSCULAR; INTRAVENOUS ONCE
Status: COMPLETED | OUTPATIENT
Start: 2020-11-15 | End: 2020-11-15

## 2020-11-15 RX ADMIN — METOCLOPRAMIDE 10 MG: 5 INJECTION, SOLUTION INTRAMUSCULAR; INTRAVENOUS at 09:54

## 2020-11-15 RX ADMIN — DIPHENHYDRAMINE HYDROCHLORIDE 25 MG: 50 INJECTION, SOLUTION INTRAMUSCULAR; INTRAVENOUS at 09:55

## 2020-11-15 RX ADMIN — KETOROLAC TROMETHAMINE 15 MG: 30 INJECTION, SOLUTION INTRAMUSCULAR at 09:55

## 2020-11-15 RX ADMIN — SODIUM CHLORIDE 1000 ML: 0.9 INJECTION, SOLUTION INTRAVENOUS at 09:42

## 2020-11-15 RX ADMIN — LIDOCAINE HYDROCHLORIDE 15 ML: 20 SOLUTION ORAL; TOPICAL at 09:52

## 2020-11-18 ENCOUNTER — TELEMEDICINE (OUTPATIENT)
Dept: FAMILY MEDICINE CLINIC | Facility: CLINIC | Age: 45
End: 2020-11-18
Payer: COMMERCIAL

## 2020-11-18 DIAGNOSIS — R11.2 NON-INTRACTABLE VOMITING WITH NAUSEA, UNSPECIFIED VOMITING TYPE: ICD-10-CM

## 2020-11-18 DIAGNOSIS — Z09 ENCOUNTER FOR EXAMINATION FOLLOWING TREATMENT AT HOSPITAL: Primary | ICD-10-CM

## 2020-11-18 DIAGNOSIS — R51.9 NONINTRACTABLE HEADACHE, UNSPECIFIED CHRONICITY PATTERN, UNSPECIFIED HEADACHE TYPE: ICD-10-CM

## 2020-11-18 DIAGNOSIS — R11.0 NAUSEA: ICD-10-CM

## 2020-11-18 DIAGNOSIS — U07.1 COVID-19 VIRUS INFECTION: ICD-10-CM

## 2020-11-18 PROCEDURE — 1036F TOBACCO NON-USER: CPT | Performed by: FAMILY MEDICINE

## 2020-11-18 PROCEDURE — 99213 OFFICE O/P EST LOW 20 MIN: CPT | Performed by: FAMILY MEDICINE

## 2020-11-18 RX ORDER — ONDANSETRON 4 MG/1
4 TABLET, ORALLY DISINTEGRATING ORAL EVERY 6 HOURS PRN
Qty: 20 TABLET | Refills: 0 | Status: SHIPPED | OUTPATIENT
Start: 2020-11-18 | End: 2021-08-25 | Stop reason: ALTCHOICE

## 2020-11-23 ENCOUNTER — HOSPITAL ENCOUNTER (OUTPATIENT)
Dept: RADIOLOGY | Facility: HOSPITAL | Age: 45
Discharge: HOME | End: 2020-11-23
Payer: COMMERCIAL

## 2020-11-23 DIAGNOSIS — R10.12 LUQ CRAMPING: ICD-10-CM

## 2020-11-23 PROCEDURE — 76700 US EXAM ABDOM COMPLETE: CPT

## 2020-11-27 ENCOUNTER — TELEPHONE (OUTPATIENT)
Dept: FAMILY MEDICINE CLINIC | Facility: CLINIC | Age: 45
End: 2020-11-27

## 2021-01-05 DIAGNOSIS — E11.9 TYPE 2 DIABETES MELLITUS WITHOUT COMPLICATION, WITHOUT LONG-TERM CURRENT USE OF INSULIN (HCC): Primary | ICD-10-CM

## 2021-01-05 RX ORDER — BLOOD-GLUCOSE METER
KIT MISCELLANEOUS
Qty: 100 EACH | Refills: 5 | Status: SHIPPED | OUTPATIENT
Start: 2021-01-05

## 2021-01-21 ENCOUNTER — TRANSCRIBE ORDERS (OUTPATIENT)
Dept: LAB | Facility: CLINIC | Age: 46
End: 2021-01-21

## 2021-01-21 ENCOUNTER — LAB (OUTPATIENT)
Dept: LAB | Facility: CLINIC | Age: 46
End: 2021-01-21
Payer: COMMERCIAL

## 2021-01-21 DIAGNOSIS — E55.9 VITAMIN D DEFICIENCY: ICD-10-CM

## 2021-01-21 DIAGNOSIS — E78.1 HYPERTRIGLYCERIDEMIA: ICD-10-CM

## 2021-01-21 DIAGNOSIS — D72.19 OTHER EOSINOPHILIA: ICD-10-CM

## 2021-01-21 DIAGNOSIS — E11.9 TYPE 2 DIABETES MELLITUS WITHOUT COMPLICATION, WITHOUT LONG-TERM CURRENT USE OF INSULIN (HCC): ICD-10-CM

## 2021-01-21 DIAGNOSIS — E53.8 VITAMIN B12 DEFICIENCY: ICD-10-CM

## 2021-01-21 DIAGNOSIS — E55.9 VITAMIN D DEFICIENCY: Primary | ICD-10-CM

## 2021-01-21 LAB
25(OH)D3 SERPL-MCNC: 10.3 NG/ML (ref 30–100)
ALBUMIN SERPL BCP-MCNC: 3.9 G/DL (ref 3.5–5)
ALP SERPL-CCNC: 64 U/L (ref 46–116)
ALT SERPL W P-5'-P-CCNC: 31 U/L (ref 12–78)
ANION GAP SERPL CALCULATED.3IONS-SCNC: 5 MMOL/L (ref 4–13)
AST SERPL W P-5'-P-CCNC: 12 U/L (ref 5–45)
BASOPHILS # BLD AUTO: 0.07 THOUSANDS/ΜL (ref 0–0.1)
BASOPHILS NFR BLD AUTO: 1 % (ref 0–1)
BILIRUB SERPL-MCNC: 0.49 MG/DL (ref 0.2–1)
BUN SERPL-MCNC: 11 MG/DL (ref 5–25)
CALCIUM SERPL-MCNC: 8.9 MG/DL (ref 8.3–10.1)
CHLORIDE SERPL-SCNC: 105 MMOL/L (ref 100–108)
CHOLEST SERPL-MCNC: 194 MG/DL (ref 50–200)
CO2 SERPL-SCNC: 28 MMOL/L (ref 21–32)
CREAT SERPL-MCNC: 0.58 MG/DL (ref 0.6–1.3)
EOSINOPHIL # BLD AUTO: 0.24 THOUSAND/ΜL (ref 0–0.61)
EOSINOPHIL NFR BLD AUTO: 4 % (ref 0–6)
ERYTHROCYTE [DISTWIDTH] IN BLOOD BY AUTOMATED COUNT: 12.5 % (ref 11.6–15.1)
EST. AVERAGE GLUCOSE BLD GHB EST-MCNC: 128 MG/DL
GFR SERPL CREATININE-BSD FRML MDRD: 110 ML/MIN/1.73SQ M
GLUCOSE P FAST SERPL-MCNC: 98 MG/DL (ref 65–99)
HBA1C MFR BLD: 6.1 %
HCT VFR BLD AUTO: 42.6 % (ref 34.8–46.1)
HDLC SERPL-MCNC: 42 MG/DL
HGB BLD-MCNC: 14.6 G/DL (ref 11.5–15.4)
IMM GRANULOCYTES # BLD AUTO: 0 THOUSAND/UL (ref 0–0.2)
IMM GRANULOCYTES NFR BLD AUTO: 0 % (ref 0–2)
LDLC SERPL CALC-MCNC: 95 MG/DL (ref 0–100)
LYMPHOCYTES # BLD AUTO: 2.43 THOUSANDS/ΜL (ref 0.6–4.47)
LYMPHOCYTES NFR BLD AUTO: 38 % (ref 14–44)
MCH RBC QN AUTO: 29.5 PG (ref 26.8–34.3)
MCHC RBC AUTO-ENTMCNC: 34.3 G/DL (ref 31.4–37.4)
MCV RBC AUTO: 86 FL (ref 82–98)
MONOCYTES # BLD AUTO: 0.55 THOUSAND/ΜL (ref 0.17–1.22)
MONOCYTES NFR BLD AUTO: 9 % (ref 4–12)
NEUTROPHILS # BLD AUTO: 3.18 THOUSANDS/ΜL (ref 1.85–7.62)
NEUTS SEG NFR BLD AUTO: 48 % (ref 43–75)
NRBC BLD AUTO-RTO: 0 /100 WBCS
PLATELET # BLD AUTO: 251 THOUSANDS/UL (ref 149–390)
PMV BLD AUTO: 11 FL (ref 8.9–12.7)
POTASSIUM SERPL-SCNC: 3.9 MMOL/L (ref 3.5–5.3)
PROT SERPL-MCNC: 7.2 G/DL (ref 6.4–8.2)
RBC # BLD AUTO: 4.95 MILLION/UL (ref 3.81–5.12)
SODIUM SERPL-SCNC: 138 MMOL/L (ref 136–145)
TRIGL SERPL-MCNC: 283 MG/DL
WBC # BLD AUTO: 6.47 THOUSAND/UL (ref 4.31–10.16)

## 2021-01-21 PROCEDURE — 36415 COLL VENOUS BLD VENIPUNCTURE: CPT

## 2021-01-21 PROCEDURE — 82306 VITAMIN D 25 HYDROXY: CPT

## 2021-01-21 PROCEDURE — 85025 COMPLETE CBC W/AUTO DIFF WBC: CPT

## 2021-01-21 PROCEDURE — 80061 LIPID PANEL: CPT

## 2021-01-21 PROCEDURE — 80053 COMPREHEN METABOLIC PANEL: CPT

## 2021-01-21 PROCEDURE — 83036 HEMOGLOBIN GLYCOSYLATED A1C: CPT

## 2021-01-21 RX ORDER — ERGOCALCIFEROL 1.25 MG/1
50000 CAPSULE ORAL WEEKLY
Qty: 12 CAPSULE | Refills: 0 | Status: SHIPPED | OUTPATIENT
Start: 2021-01-21 | End: 2021-02-07 | Stop reason: SDUPTHER

## 2021-01-22 ENCOUNTER — VBI (OUTPATIENT)
Dept: ADMINISTRATIVE | Facility: OTHER | Age: 46
End: 2021-01-22

## 2021-01-22 ENCOUNTER — TELEPHONE (OUTPATIENT)
Dept: FAMILY MEDICINE CLINIC | Facility: CLINIC | Age: 46
End: 2021-01-22

## 2021-01-22 NOTE — TELEPHONE ENCOUNTER
----- Message from Jerri Green MD sent at 1/21/2021  9:57 PM EST -----  Please let patient know that her hemoglobin A1c is 6 1  I would like her to work on avoiding sweets, limiting carbohydrates and regular exercise  I will continue to monitor this  In addition, her vitamin-D is in the deficient range  I will call in a prescription for her to take on a weekly basis with food for the next 3 months and will recheck this again in 3 months  In addition, her kidneys, liver were within normal range  Her bad cholesterol has improved however her triglycerides do remain elevated  I would like her to start fish oil supplement 1000 mg twice daily  I will monitor this and recheck all of her blood work again in 3 months including A1c, vitamin-D and cholesterol    Thank you

## 2021-01-22 NOTE — RESULT ENCOUNTER NOTE
Please let patient know that her hemoglobin A1c is 6 1  I would like her to work on avoiding sweets, limiting carbohydrates and regular exercise  I will continue to monitor this  In addition, her vitamin-D is in the deficient range  I will call in a prescription for her to take on a weekly basis with food for the next 3 months and will recheck this again in 3 months  In addition, her kidneys, liver were within normal range  Her bad cholesterol has improved however her triglycerides do remain elevated  I would like her to start fish oil supplement 1000 mg twice daily  I will monitor this and recheck all of her blood work again in 3 months including A1c, vitamin-D and cholesterol    Thank you

## 2021-02-07 DIAGNOSIS — E55.9 VITAMIN D DEFICIENCY: ICD-10-CM

## 2021-02-08 NOTE — TELEPHONE ENCOUNTER
Patient is requesting a refill for Vitamin D     Requested Prescriptions     Pending Prescriptions Disp Refills    ergocalciferol (VITAMIN D2) 50,000 units 12 capsule 0     Sig: Take 1 capsule (50,000 Units total) by mouth once a week       Please review and advise

## 2021-02-11 ENCOUNTER — TELEPHONE (OUTPATIENT)
Dept: FAMILY MEDICINE CLINIC | Facility: CLINIC | Age: 46
End: 2021-02-11

## 2021-02-11 ENCOUNTER — TELEMEDICINE (OUTPATIENT)
Dept: FAMILY MEDICINE CLINIC | Facility: CLINIC | Age: 46
End: 2021-02-11
Payer: COMMERCIAL

## 2021-02-11 DIAGNOSIS — R53.1 WEAKNESS: ICD-10-CM

## 2021-02-11 DIAGNOSIS — R21 SKIN RASH: ICD-10-CM

## 2021-02-11 DIAGNOSIS — Z20.822 EXPOSURE TO COVID-19 VIRUS: ICD-10-CM

## 2021-02-11 DIAGNOSIS — R52 BODY ACHES: ICD-10-CM

## 2021-02-11 DIAGNOSIS — R51.9 NONINTRACTABLE HEADACHE, UNSPECIFIED CHRONICITY PATTERN, UNSPECIFIED HEADACHE TYPE: ICD-10-CM

## 2021-02-11 DIAGNOSIS — R52 BODY ACHES: Primary | ICD-10-CM

## 2021-02-11 PROCEDURE — U0003 INFECTIOUS AGENT DETECTION BY NUCLEIC ACID (DNA OR RNA); SEVERE ACUTE RESPIRATORY SYNDROME CORONAVIRUS 2 (SARS-COV-2) (CORONAVIRUS DISEASE [COVID-19]), AMPLIFIED PROBE TECHNIQUE, MAKING USE OF HIGH THROUGHPUT TECHNOLOGIES AS DESCRIBED BY CMS-2020-01-R: HCPCS | Performed by: FAMILY MEDICINE

## 2021-02-11 PROCEDURE — 1036F TOBACCO NON-USER: CPT | Performed by: FAMILY MEDICINE

## 2021-02-11 PROCEDURE — 99214 OFFICE O/P EST MOD 30 MIN: CPT | Performed by: FAMILY MEDICINE

## 2021-02-11 PROCEDURE — U0005 INFEC AGEN DETEC AMPLI PROBE: HCPCS | Performed by: FAMILY MEDICINE

## 2021-02-11 RX ORDER — CLOTRIMAZOLE AND BETAMETHASONE DIPROPIONATE 10; .64 MG/G; MG/G
CREAM TOPICAL 2 TIMES DAILY
Qty: 45 G | Refills: 3 | Status: SHIPPED | OUTPATIENT
Start: 2021-02-11

## 2021-02-11 RX ORDER — ERGOCALCIFEROL 1.25 MG/1
50000 CAPSULE ORAL WEEKLY
Qty: 12 CAPSULE | Refills: 0 | Status: SHIPPED | OUTPATIENT
Start: 2021-02-11 | End: 2021-05-25 | Stop reason: SDUPTHER

## 2021-02-11 NOTE — PROGRESS NOTES
Virtual Regular Visit      Assessment/Plan:    Problem List Items Addressed This Visit        Musculoskeletal and Integument    Skin rash    Relevant Medications    clotrimazole-betamethasone (LOTRISONE) 1-0 05 % cream       Other    Weakness    Relevant Orders    Novel Coronavirus (Covid-19),PCR [facility] - Collected at [facility] or [facility]    Nonintractable headache    Relevant Orders    Novel Coronavirus (Covid-19),PCR [facility] - Collected at [employer] [facility] or [facility]    Exposure to COVID-19 virus    Relevant Orders    Novel Coronavirus (Covid-19),PCR [facility] - Collected at [facility] or [facility]    Body aches - Primary    Relevant Orders    Novel Coronavirus (Covid-19),PCR [facility] - Collected at [facility] or [facility]         50-year-old female presents today for evaluation of headaches, body aches, weakness, fatigue that started on Monday  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 [employer]  Patient does have history of COVID-19 virus infection that was diagnosed on November 2nd  She was advised to repeat a chest x-ray which she has been reminded again  I would like to proceed with COVID testing  She will proceed with this  I would like her to continue vitamin-D, vitamin-C, zinc, maintain adequate hydration  If she should start develop any other symptoms such as shortness of breath or anything else that is concerning, I would like her to proceed to urgent care or ER  I will notify her of results when they are back  Skin rash:  Likely cutaneous candidiasis  [facility]  If no improvement within the next 1-2 weeks, I would like her to let me know, will likely refer to Dermatology         Reason for visit is  headache, body aches, weakness since Monday  Chief Complaint   Patient presents with    Virtual Regular Visit      headache, body aches, weakness since Monday        Encounter provider Ronda Hawkins MD    Provider located at [place] [address] PRACTICE  [address] [facility] [address]      Recent Visits  No visits were found meeting these conditions  Showing recent visits within past 7 days and meeting all other requirements     Today's Visits  Date Type Provider Dept   02/11/21 Telephone Darling Mckeon   02/11/21 Telemedicine Babak Prasad, [address] [facility] today's visits and meeting all other requirements     Future Appointments  No visits were found meeting these conditions  Showing future appointments within next 150 days and meeting all other requirements        The patient was identified by name and date of birth  Gege Live was informed that this is a telemedicine visit and that the visit is being conducted through [employer] [place] [address] and patient was informed that this is not a secure, HIPAA-compliant platform  She agrees to proceed     My office door was closed  No one else was in the room  She acknowledged consent and understanding of privacy and security of the video platform  The patient has agreed to participate and understands they can discontinue the visit at any time  Patient is aware this is a billable service  Subjective  Gege Live is a 51 y o  female  Presents today via virtual video telemedicine visit for evaluation of symptoms that started on Monday which included headache, body aches, weakness  Patient states her symptoms worsened on Tuesday, had mild episode of difficulty breathing as well as low back pain and dizziness  Patient continues to feel weak as well as body aches  Denies any fevers  Denies cold symptoms  She does have occasional sneezing  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 [employer]  Patient worked on Monday however prior to this she worked last weekend with last exposure was Tuesday  Denies loss of taste or smell  Patient does have prior history of COVID-19 virus infection with positive results on 11/02      Patient also states she continues experience a tightness and pulling in her abdomen  She tried to call GI and could not get through  She will try again  If she does not have any look, she will let me know, we can help her schedule an appointment  In addition, patient has a rash in her groin region bilaterally which is associated with itching  Patient states when she is at work, she does were lay ears and tends to sweat in the area  HPI     History reviewed  No pertinent past medical history  Past Surgical History:   Procedure Laterality Date     SECTION      CHOLECYSTECTOMY      TOTAL ABDOMINAL HYSTERECTOMY      Due to fibroids   Ovaries present       Current Outpatient Medications   Medication Sig Dispense Refill    acetaminophen (TYLENOL) 500 mg tablet Take 500 mg by mouth as needed        albuterol (Ventolin HFA) 90 mcg/act inhaler Inhale 2 puffs every 6 (six) hours as needed for wheezing (Patient not taking: Reported on 11/15/2020) 18 g 0    benzonatate (TESSALON) 200 MG capsule Take 1 capsule (200 mg total) by mouth 3 (three) times a day as needed for cough (Patient not taking: Reported on 11/15/2020) 30 capsule 0    Cetirizine HCl (ZYRTEC ALLERGY PO) Take 1 tablet by mouth as needed       clotrimazole-betamethasone (LOTRISONE) 1-0 05 % cream Apply topically 2 (two) times a day 45 g 3    cyanocobalamin (VITAMIN B-12) 1,000 mcg tablet Take 2,000 mcg by mouth daily      diclofenac sodium (VOLTAREN) 50 mg EC tablet Take 1 tablet (50 mg total) by mouth 2 (two) times a day (Patient not taking: Reported on 11/15/2020) 60 tablet 1    ergocalciferol (VITAMIN D2) 50,000 units Take 1 capsule (50,000 Units total) by mouth once a week 12 capsule 0    FREESTYLE LITE test strip USE TO TEST BLOOD SUGAR AS DIRECTED 100 each 5    ibuprofen (MOTRIN) 800 mg tablet Take 1 tablet (800 mg total) by mouth 3 (three) times a day 21 tablet 0    metFORMIN (GLUCOPHAGE) 500 mg tablet Take 1 tablet (500 mg total) by mouth daily with breakfast (Patient not taking: Reported on 11/15/2020) 30 tablet 5    ondansetron (ZOFRAN-ODT) 4 mg disintegrating tablet Take 1 tablet (4 mg total) by mouth every 6 (six) hours as needed for nausea or vomiting (Patient not taking: Reported on 11/15/2020) 20 tablet 0    ondansetron (ZOFRAN-ODT) 4 mg disintegrating tablet Take 1 tablet (4 mg total) by mouth every 8 (eight) hours as needed for nausea or vomiting 20 tablet 0    ondansetron (ZOFRAN-ODT) 4 mg disintegrating tablet Take 1 tablet (4 mg total) by mouth every 6 (six) hours as needed for nausea or vomiting 20 tablet 0     No current facility-administered medications for this visit  Allergies   Allergen Reactions    Bee Pollen Itching     Itchy, runny nose    Dust Mite Extract Itching     Itchy, runny nose    Pollen Extract Itching     Itchy, runny nose       Review of Systems   Constitutional: Positive for fatigue  Negative for fever  HENT: Positive for sneezing  Negative for congestion  Respiratory: Negative for cough  Musculoskeletal: Positive for myalgias  Neurological: Positive for weakness and headaches  I have reviewed the patient's medical history in detail; there are no changes to the history as noted in the electronic medical record  Video Exam    There were no vitals filed for this visit  Physical Exam  Constitutional:       General: She is not in acute distress  Appearance: Normal appearance  HENT:      Head: Normocephalic  Pulmonary:      Effort: Pulmonary effort is normal  No respiratory distress  Breath sounds: Normal breath sounds  Neurological:      Mental Status: She is alert and oriented to person, place, and time  Psychiatric:         Mood and Affect: Mood normal           I spent 25 minutes directly with the patient during this visit      VIRTUAL VISIT DISCLAIMER    Gemma Owen acknowledges that she has consented to an online visit or consultation   She understands that the online visit is based solely on information provided by her, and that, in the absence of a face-to-face physical evaluation by the physician, the diagnosis she receives is both limited and provisional in terms of accuracy and completeness  This is not intended to replace a full medical face-to-face evaluation by the physician  Adrienne Garza understands and accepts these terms

## 2021-02-11 NOTE — LETTER
February 11, 2021    Tanika Danielson  [address] [address]      To Whom It May Concern:    Tanika Danielson is currently being tested for Covid-19, the results are still in process  Please excuse patient from work on 02/09/2021  Patients return to work date is to be determined when results are finalized ,depending on patients Covid-19 test results            Sincerely,        Bhakti De Leon MD

## 2021-02-11 NOTE — TELEPHONE ENCOUNTER
Patient requesting a doctor's note for being out of work on 2/9/21 due to sickness/covid symptoms  The RTW date will be dependent on covid test results   Please advise

## 2021-02-12 LAB — SARS-COV-2 RNA RESP QL NAA+PROBE: NEGATIVE

## 2021-05-05 ENCOUNTER — OFFICE VISIT (OUTPATIENT)
Dept: FAMILY MEDICINE CLINIC | Facility: CLINIC | Age: 46
End: 2021-05-05
Payer: COMMERCIAL

## 2021-05-05 VITALS
OXYGEN SATURATION: 98 % | WEIGHT: 178 LBS | HEART RATE: 68 BPM | BODY MASS INDEX: 28.61 KG/M2 | SYSTOLIC BLOOD PRESSURE: 100 MMHG | DIASTOLIC BLOOD PRESSURE: 76 MMHG | TEMPERATURE: 98 F | HEIGHT: 66 IN | RESPIRATION RATE: 18 BRPM

## 2021-05-05 DIAGNOSIS — E53.8 VITAMIN B12 DEFICIENCY: ICD-10-CM

## 2021-05-05 DIAGNOSIS — E11.9 TYPE 2 DIABETES MELLITUS WITHOUT COMPLICATION, WITHOUT LONG-TERM CURRENT USE OF INSULIN (HCC): ICD-10-CM

## 2021-05-05 DIAGNOSIS — E78.1 HYPERTRIGLYCERIDEMIA: ICD-10-CM

## 2021-05-05 DIAGNOSIS — R82.90 ABNORMAL FINDING IN URINE: ICD-10-CM

## 2021-05-05 DIAGNOSIS — Z00.00 ROUTINE HEALTH MAINTENANCE: Primary | ICD-10-CM

## 2021-05-05 DIAGNOSIS — E55.9 VITAMIN D DEFICIENCY: ICD-10-CM

## 2021-05-05 DIAGNOSIS — M54.12 CERVICAL RADICULOPATHY: ICD-10-CM

## 2021-05-05 LAB
BACTERIA UR QL AUTO: ABNORMAL /HPF
BILIRUB UR QL STRIP: NEGATIVE
CLARITY UR: ABNORMAL
COLOR UR: YELLOW
GLUCOSE UR STRIP-MCNC: NEGATIVE MG/DL
HGB UR QL STRIP.AUTO: ABNORMAL
HYALINE CASTS #/AREA URNS LPF: ABNORMAL /LPF
KETONES UR STRIP-MCNC: NEGATIVE MG/DL
LEUKOCYTE ESTERASE UR QL STRIP: NEGATIVE
NITRITE UR QL STRIP: NEGATIVE
NON-SQ EPI CELLS URNS QL MICRO: ABNORMAL /HPF
PH UR STRIP.AUTO: 5 [PH]
PROT UR STRIP-MCNC: NEGATIVE MG/DL
RBC #/AREA URNS AUTO: ABNORMAL /HPF
SL AMB  POCT GLUCOSE, UA: NORMAL
SL AMB LEUKOCYTE ESTERASE,UA: NORMAL
SL AMB POCT BILIRUBIN,UA: NORMAL
SL AMB POCT BLOOD,UA: NORMAL
SL AMB POCT CLARITY,UA: CLEAR
SL AMB POCT COLOR,UA: YELLOW
SL AMB POCT HEMOGLOBIN AIC: 6.3 (ref ?–6.5)
SL AMB POCT KETONES,UA: NORMAL
SL AMB POCT NITRITE,UA: NORMAL
SL AMB POCT PH,UA: 5
SL AMB POCT SPECIFIC GRAVITY,UA: 1
SL AMB POCT URINE PROTEIN: NORMAL
SL AMB POCT UROBILINOGEN: 0.2
SP GR UR STRIP.AUTO: 1.02 (ref 1–1.03)
UROBILINOGEN UR QL STRIP.AUTO: 0.2 E.U./DL
WBC #/AREA URNS AUTO: ABNORMAL /HPF

## 2021-05-05 PROCEDURE — 81001 URINALYSIS AUTO W/SCOPE: CPT | Performed by: FAMILY MEDICINE

## 2021-05-05 PROCEDURE — 1036F TOBACCO NON-USER: CPT | Performed by: FAMILY MEDICINE

## 2021-05-05 PROCEDURE — 83036 HEMOGLOBIN GLYCOSYLATED A1C: CPT | Performed by: FAMILY MEDICINE

## 2021-05-05 PROCEDURE — 3061F NEG MICROALBUMINURIA REV: CPT | Performed by: FAMILY MEDICINE

## 2021-05-05 PROCEDURE — 99396 PREV VISIT EST AGE 40-64: CPT | Performed by: FAMILY MEDICINE

## 2021-05-05 PROCEDURE — 3725F SCREEN DEPRESSION PERFORMED: CPT | Performed by: FAMILY MEDICINE

## 2021-05-05 PROCEDURE — 87086 URINE CULTURE/COLONY COUNT: CPT | Performed by: FAMILY MEDICINE

## 2021-05-05 PROCEDURE — 3008F BODY MASS INDEX DOCD: CPT | Performed by: FAMILY MEDICINE

## 2021-05-05 PROCEDURE — 81003 URINALYSIS AUTO W/O SCOPE: CPT | Performed by: FAMILY MEDICINE

## 2021-05-05 NOTE — PATIENT INSTRUCTIONS

## 2021-05-05 NOTE — PROGRESS NOTES
FAMILY PRACTICE OFFICE VISIT       NAME: Charley Arredondo  AGE: 51 y o  SEX: female       : 1975        MRN: [id number]    DATE: 2021  TIME: 1:44 PM    Assessment and Plan     Problem List Items Addressed This Visit        Other    Hypertriglyceridemia    Vitamin D deficiency    Routine health maintenance - Primary    Vitamin B12 deficiency      Other Visit Diagnoses     Type 2 diabetes mellitus without complication, without long-term current use of insulin ([facility] [address] )            Routine health maintenance: Will check routine blood work  She will schedule mammogram provided  She will schedule mammogram as well as screening colonoscopy  Patient did have 1st COVID-19 vaccine  Up-to-date with Tdap  Recommend maintaining well balanced diet, exercising 150 minutes of moderate aerobic intense exercise weekly, getting 8 hours sleep nightly  Mood is overall good  Diabetes:  POC A1c noted to be 6 3  Refill for metformin called in  Recommend scheduling appointment for eye exam   Continue with lifestyle modifications  Hypertriglyceridemia:  I will go ahead and check lipid panel  Vitamin-D deficiency:  I will go ahead and check vitamin-D level    Vitamin B12 deficiency:  I will go ahead and check vitamin B12 level  Cervical radiculopathy:  This is persisting and feel she would benefit from evaluation by pain management  Referral has been provided  Referral to physical therapy has been provided as well  No red flag signs noted  BMI Counseling: Body mass index is 28 73 kg/m²  The BMI is above normal  Nutrition recommendations include increasing intake of lean protein  Exercise recommendations include moderate aerobic physical activity for 150 minutes/week  There are no Patient Instructions on file for this visit          Chief Complaint     Chief Complaint   Patient presents with    Well Check       History of Present Illness     HPI     70-year-old female presents today for routine health maintenance exam   She has been doing well  POC A1c noted to be 6 3  She is agreeable on having blood work done  She will schedule mammogram as well as colonoscopy  Patient states her neck pain continues  Review of Systems   Review of Systems   Constitutional: Negative for appetite change  Respiratory: Negative for shortness of breath  Cardiovascular: Negative  Gastrointestinal: Negative for abdominal pain and constipation  Genitourinary: Negative for dysuria  Musculoskeletal: Positive for neck pain  Psychiatric/Behavioral: Negative for dysphoric mood  Active Problem List     Patient Active Problem List   Diagnosis    Hypertriglyceridemia    Elevated blood sugar    Vitamin D deficiency    Neck pain    Generalized anxiety disorder    Insomnia    Weakness    Routine health maintenance    Vitamin B12 deficiency    Nonintractable headache    Left shoulder pain    Cervical radiculopathy    Myofascial pain    Migraine without aura and without status migrainosus, not intractable    DDD (degenerative disc disease), cervical    Spondylosis of cervical spine without myelopathy    Acute viral syndrome    Chronic pain of left knee    Hip pain    Greater trochanteric pain syndrome of left lower extremity    Exposure to COVID-19 virus    COVID-19 virus infection    Abnormal chest x-ray    Cough    Body aches    Skin rash       Past Medical History:  History reviewed  No pertinent past medical history  Past Surgical History:  Past Surgical History:   Procedure Laterality Date     SECTION      CHOLECYSTECTOMY      TOTAL ABDOMINAL HYSTERECTOMY      Due to fibroids   Ovaries present       Family History:  Family History   Problem Relation Age of Onset    Diabetes Mother     Hypertension Mother     Heart attack Mother     Stroke Father     No Known Problems Daughter     No Known Problems Maternal Grandmother     No Known Problems Maternal Grandfather     No Known Problems Paternal Grandmother     No Known Problems Paternal Grandfather     No Known Problems Maternal Aunt     No Known Problems Maternal Aunt     No Known Problems Maternal Aunt     No Known Problems Paternal Aunt     No Known Problems Paternal Aunt     No Known Problems Paternal Aunt     No Known Problems Paternal Aunt     No Known Problems Paternal Aunt        Social History:  Social History     Socioeconomic History    Marital status: /Civil Union     Spouse name: Not on file    Number of children: Not on file    Years of education: Not on file    Highest education level: Not on file   Occupational History    Not on file   Social Needs    Financial resource strain: Not on file    Food insecurity     Worry: Not on file     Inability: Not on file   [employer] needs     Medical: Not on file     Non-medical: Not on file   Tobacco Use    Smoking status: Never Smoker    Smokeless tobacco: Never Used   Substance and Sexual Activity    Alcohol use: No    Drug use: No    Sexual activity: Not on file   Lifestyle    Physical activity     Days per week: Not on file     Minutes per session: Not on file    Stress: Not on file   Relationships    Social connections     Talks on phone: Not on file     Gets together: Not on file     Attends [employer] service: Not on file     Active member of club or organization: Not on file     Attends meetings of clubs or organizations: Not on file     Relationship status: Not on file    Intimate partner violence     Fear of current or ex partner: Not on file     Emotionally abused: Not on file     Physically abused: Not on file     Forced sexual activity: Not on file   Other Topics Concern    Not on file   Social History Narrative    Caffeine use    Description: Tea 1 cup     I have reviewed the patient's medical history in detail; there are no changes to the history as noted in the electronic medical record      Objective     Vitals:    05/05/21 1339   BP: 100/76   Pulse: 68   Resp: 18   Temp: 98 °F (36 7 °C)   SpO2: 98%     Wt Readings from Last 3 Encounters:   05/05/21 80 7 kg (178 lb)   11/15/20 80 7 kg (178 lb)   11/09/20 80 7 kg (178 lb)     PHQ-9 Depression Screening    PHQ-9:   Frequency of the following problems over the past two weeks:      Little interest or pleasure in doing things: 0 - not at all  Feeling down, depressed, or hopeless: 0 - not at all  PHQ-2 Score: 0           Physical Exam  Vitals signs and nursing note reviewed  Constitutional:       General: She is not in acute distress  Appearance: Normal appearance  She is well-developed  HENT:      Head: Normocephalic and atraumatic  Right Ear: Tympanic membrane normal       Left Ear: Tympanic membrane normal       Mouth/Throat:      Mouth: Mucous membranes are moist       Pharynx: Oropharynx is clear  Eyes:      Conjunctiva/sclera: Conjunctivae normal       Pupils: Pupils are equal, round, and reactive to light  Neck:      Musculoskeletal: Normal range of motion and neck supple  Thyroid: No thyromegaly  Cardiovascular:      Rate and Rhythm: Normal rate and regular rhythm  Pulmonary:      Effort: Pulmonary effort is normal       Breath sounds: Normal breath sounds  Abdominal:      General: Bowel sounds are normal       Palpations: Abdomen is soft  Tenderness: There is no abdominal tenderness  Musculoskeletal: Normal range of motion  General: No swelling  Lymphadenopathy:      Cervical: No cervical adenopathy  Neurological:      Mental Status: She is alert and oriented to person, place, and time     Psychiatric:         Mood and Affect: Mood normal          Pertinent Laboratory/Diagnostic Studies:  Lab Results   Component Value Date    BUN 11 01/21/2021    CREATININE 0 58 (L) 01/21/2021    CALCIUM 8 9 01/21/2021    K 3 9 01/21/2021    CO2 28 01/21/2021     01/21/2021     Lab Results   Component Value Date    ALT 31 01/21/2021    AST 12 01/21/2021    ALKPHOS 64 01/21/2021       Lab Results   Component Value Date    WBC 6 47 01/21/2021    HGB 14 6 01/21/2021    HCT 42 6 01/21/2021    MCV 86 01/21/2021     01/21/2021       No results found for: TSH    No results found for: CHOL  Lab Results   Component Value Date    TRIG 283 (H) 01/21/2021     Lab Results   Component Value Date    HDL 42 01/21/2021     Lab Results   Component Value Date    LDLCALC 95 01/21/2021     Lab Results   Component Value Date    HGBA1C 6 1 (H) 01/21/2021       Results for orders placed or performed in visit on 02/11/21   Novel Coronavirus (Covid-19),PCR [facility] - [facility] at   [facility] [address] or [facility]    Specimen: Nose; Nares   Result Value Ref Range    SARS-CoV-2 Negative Negative       No orders of the defined types were placed in this encounter        ALLERGIES:  Allergies   Allergen Reactions    Bee Pollen Itching     Itchy, runny nose    Dust Mite Extract Itching     Itchy, runny nose    Pollen Extract Itching     Itchy, runny nose       Current Medications     Current Outpatient Medications   Medication Sig Dispense Refill    acetaminophen (TYLENOL) 500 mg tablet Take 500 mg by mouth as needed        Cetirizine HCl (ZYRTEC ALLERGY PO) Take 1 tablet by mouth as needed       clotrimazole-betamethasone (LOTRISONE) 1-0 05 % cream Apply topically 2 (two) times a day 45 g 3    cyanocobalamin (VITAMIN B-12) 1,000 mcg tablet Take 2,000 mcg by mouth daily      ergocalciferol (VITAMIN D2) 50,000 units Take 1 capsule (50,000 Units total) by mouth once a week 12 capsule 0    FREESTYLE LITE test strip USE TO TEST BLOOD SUGAR AS DIRECTED 100 each 5    metFORMIN (GLUCOPHAGE) 500 mg tablet Take 1 tablet (500 mg total) by mouth daily with breakfast 30 tablet 5    albuterol (Ventolin HFA) 90 mcg/act inhaler Inhale 2 puffs every 6 (six) hours as needed for wheezing (Patient not taking: Reported on 11/15/2020) 18 g 0    benzonatate (TESSALON) 200 MG capsule Take 1 capsule (200 mg total) by mouth 3 (three) times a day as needed for cough (Patient not taking: Reported on 11/15/2020) 30 capsule 0    diclofenac sodium (VOLTAREN) 50 mg EC tablet Take 1 tablet (50 mg total) by mouth 2 (two) times a day (Patient not taking: Reported on 11/15/2020) 60 tablet 1    ibuprofen (MOTRIN) 800 mg tablet Take 1 tablet (800 mg total) by mouth 3 (three) times a day (Patient not taking: Reported on 5/5/2021) 21 tablet 0    ondansetron (ZOFRAN-ODT) 4 mg disintegrating tablet Take 1 tablet (4 mg total) by mouth every 6 (six) hours as needed for nausea or vomiting (Patient not taking: Reported on 11/15/2020) 20 tablet 0    ondansetron (ZOFRAN-ODT) 4 mg disintegrating tablet Take 1 tablet (4 mg total) by mouth every 8 (eight) hours as needed for nausea or vomiting (Patient not taking: Reported on 5/5/2021) 20 tablet 0    ondansetron (ZOFRAN-ODT) 4 mg disintegrating tablet Take 1 tablet (4 mg total) by mouth every 6 (six) hours as needed for nausea or vomiting (Patient not taking: Reported on 5/5/2021) 20 tablet 0     No current facility-administered medications for this visit            Health Maintenance     Health Maintenance   Topic Date Due    Pneumococcal Vaccine: Pediatrics (0 to 5 Years) and At-Risk Patients (6 to 59 Years) (1 of 1 - PPSV23) Never done    HIV Screening  Never done    BMI: Followup Plan  Never done    Annual Physical  05/18/2019    PT PLAN OF CARE  03/07/2020    MAMMOGRAM  07/15/2021    COVID-19 Vaccine (2 - Moderna 2-dose series) 05/20/2021    Influenza Vaccine (Season Ended) 09/01/2021    Depression Screening PHQ  05/05/2022    BMI: Adult  05/05/2022    DTaP,Tdap,and Td Vaccines (3 - Td) 03/09/2026    HIB Vaccine  Aged Out    Hepatitis B Vaccine  Aged Out    IPV Vaccine  Aged Out    Hepatitis A Vaccine  Aged Out    Meningococcal ACWY Vaccine  Aged Out    HPV Vaccine  Aged [employer] History   Administered Date(s) Administered    Fluzone Split Quad 0 5 mL 11/09/2015    INFLUENZA 11/09/2015, 11/01/2018    SARS-CoV-2 / COVID-19 mRNA IM (Moderna) 04/22/2021    Td (adult), adsorbed 03/09/2016    Tdap 01/01/2016       Kyaw Santos MD

## 2021-05-06 LAB — BACTERIA UR CULT: NORMAL

## 2021-05-11 NOTE — RESULT ENCOUNTER NOTE
Please let patient know that her urinalysis showed mild microscopic blood  I would like her to drop-off another urine sample next week for recheck    Thank you

## 2021-05-18 ENCOUNTER — LAB (OUTPATIENT)
Dept: LAB | Facility: CLINIC | Age: 46
End: 2021-05-18
Payer: COMMERCIAL

## 2021-05-18 ENCOUNTER — TRANSCRIBE ORDERS (OUTPATIENT)
Dept: LAB | Facility: CLINIC | Age: 46
End: 2021-05-18

## 2021-05-18 DIAGNOSIS — E53.8 VITAMIN B12 DEFICIENCY: ICD-10-CM

## 2021-05-18 DIAGNOSIS — E11.9 TYPE 2 DIABETES MELLITUS WITHOUT COMPLICATION, WITHOUT LONG-TERM CURRENT USE OF INSULIN (HCC): ICD-10-CM

## 2021-05-18 DIAGNOSIS — E55.9 VITAMIN D DEFICIENCY: ICD-10-CM

## 2021-05-18 DIAGNOSIS — E78.1 HYPERTRIGLYCERIDEMIA: ICD-10-CM

## 2021-05-18 LAB
25(OH)D3 SERPL-MCNC: 13 NG/ML (ref 30–100)
CHOLEST SERPL-MCNC: 199 MG/DL (ref 50–200)
EST. AVERAGE GLUCOSE BLD GHB EST-MCNC: 131 MG/DL
HBA1C MFR BLD: 6.2 %
HDLC SERPL-MCNC: 45 MG/DL
LDLC SERPL CALC-MCNC: 101 MG/DL (ref 0–100)
TRIGL SERPL-MCNC: 265 MG/DL
VIT B12 SERPL-MCNC: 360 PG/ML (ref 100–900)

## 2021-05-18 PROCEDURE — 3044F HG A1C LEVEL LT 7.0%: CPT | Performed by: FAMILY MEDICINE

## 2021-05-18 PROCEDURE — 36415 COLL VENOUS BLD VENIPUNCTURE: CPT

## 2021-05-18 PROCEDURE — 82607 VITAMIN B-12: CPT

## 2021-05-18 PROCEDURE — 80061 LIPID PANEL: CPT

## 2021-05-18 PROCEDURE — 83036 HEMOGLOBIN GLYCOSYLATED A1C: CPT

## 2021-05-18 PROCEDURE — 82306 VITAMIN D 25 HYDROXY: CPT

## 2021-05-25 DIAGNOSIS — E55.9 VITAMIN D DEFICIENCY: ICD-10-CM

## 2021-05-25 DIAGNOSIS — E11.9 TYPE 2 DIABETES MELLITUS WITHOUT COMPLICATION, WITHOUT LONG-TERM CURRENT USE OF INSULIN (HCC): ICD-10-CM

## 2021-05-25 DIAGNOSIS — E53.8 VITAMIN B12 DEFICIENCY: Primary | ICD-10-CM

## 2021-05-25 DIAGNOSIS — E78.1 HYPERTRIGLYCERIDEMIA: ICD-10-CM

## 2021-05-25 RX ORDER — ERGOCALCIFEROL 1.25 MG/1
50000 CAPSULE ORAL WEEKLY
Qty: 12 CAPSULE | Refills: 0 | Status: SHIPPED | OUTPATIENT
Start: 2021-05-25 | End: 2021-12-07

## 2021-05-25 NOTE — RESULT ENCOUNTER NOTE
Please let patient know that her cholesterol is overall stable  Her triglycerides are elevated and would like her to take fish oil 1000 mg twice daily  I would like her to avoid saturated fats including red meat  I will recheck again in 3-4 months  Her A1c is 6 2  I would like her to work on avoiding sweets, limiting carbohydrates as well as regular exercise to work on weight loss  Her vitamin-D is in the deficient range and will call in a prescription for to take on a weekly basis with food for the next 3 months and will recheck this along with her cholesterol and A1c  Her vitamin B12 is low normal   I would like her to take 1000 mcg Monday to Friday and 500 mcg Saturday / Sunday and will recheck this as well    Thank you

## 2021-06-24 ENCOUNTER — EVALUATION (OUTPATIENT)
Dept: PHYSICAL THERAPY | Facility: REHABILITATION | Age: 46
End: 2021-06-24
Payer: COMMERCIAL

## 2021-06-24 DIAGNOSIS — M54.12 CERVICAL RADICULOPATHY: Primary | ICD-10-CM

## 2021-06-24 PROCEDURE — 97162 PT EVAL MOD COMPLEX 30 MIN: CPT

## 2021-06-24 PROCEDURE — 97110 THERAPEUTIC EXERCISES: CPT

## 2021-06-24 NOTE — PROGRESS NOTES
PT Evaluation     Today's date: 2021  Patient name: Tano Lomax  : 1975  MRN: [id number]  Referring provider: Tatianna Silva MD  Dx:   Encounter Diagnosis     ICD-10-CM    1  Cervical radiculopathy  M54 12 Ambulatory referral to Physical Therapy       Start Time: 1500  Stop Time: 1530  Total time in clinic (min): 30 minutes    Assessment  Assessment details: Malissa Irwin is a 52 yo female referred to PT for cervical radiculopathy  Patient reports chronic neck pain for over a year at this time  Pain is localized to L neck that does radiate into bilateral shoulders at times  Patient reports numbness/tingling into the hands, sensation testing ws unremarkable  No significant weakness  Patient has a history of headaches and dizziness  Pain reproduced with neck movement and compression testing  Pain improved with massage and distraction of the neck  Examination limited today due to patient arriving 15 minutes late  Will further assess symptoms next visit  Discussed POC and given HEP  Impairments: abnormal or restricted ROM, activity intolerance, lacks appropriate home exercise program, pain with function, poor posture  and poor body mechanics    Symptom irritability: moderateUnderstanding of Dx/Px/POC: good   Prognosis: good    Goals  Short Term Goals (Week 4):  1  Decreased pain by 50%  2  Improve cervical AROM to minimal restrictions throughout   3  < 4/10 pain with all cervical AROM  Long Term Goals (8 weeks):  1  Pain free with all cervical AROM  2  FOTO score >60  3   Fully independent with HEP by discharge      Plan  Patient would benefit from: skilled speech therapy  Planned modality interventions: low level laser therapy, electrical stimulation/Russian stimulation and thermotherapy: hydrocollator packs  Planned therapy interventions: IADL retraining, joint mobilization, manual therapy, massage, activity modification, behavior modification, patient education, neuromuscular re-education, therapeutic exercise, therapeutic activities, stretching, strengthening, flexibility, functional ROM exercises, graded activity, graded exercise, graded motor and home exercise program  Frequency: 1x week  Duration in weeks: 8  Treatment plan discussed with: patient        Subjective Evaluation    History of Present Illness  Mechanism of injury: Patient reports chronic neck and bilateral shoulder pain  CSI in the neck  No previous injuries or traumas in the neck  Patient states the pain radiates from the neck into the shoulders  Patient reports some feelings of weakness on the L side of the face occasionally  Denies any difficulty swallowing or eating  Patient does report some bilateral numbness  History of headaches  Pain  Current pain ratin  At worst pain rating: 10  Quality: dull ache  Alleviating factors: massage, exercises  Aggravating factors: sitting    Treatments  Previous treatment: physical therapy  Patient Goals  Patient goals for therapy: decreased pain, increased motion, independence with ADLs/IADLs, increased strength and return to work          Objective     Concurrent Complaints  Positive for night pain, dizziness and headaches  Active Range of Motion   Cervical/Thoracic Spine       Cervical    Subcranial retraction:   Restriction level: moderate  Flexion:  Restriction level: moderate  Extension:  Restriction level: moderate  Left lateral flexion:  Restriction level: moderate  Right lateral flexion:  with pain Restriction level moderate  Left rotation:  Restriction level: moderate  Right rotation:  with pain Restriction level: moderate    General Comments:      Cervical/Thoracic Comments  UQS  Myotomes: 4/5 [id number]    Sensation:   Sensation intact    Reflexes  2+ throughout    tighntess going to R    Positive Special Tests:  Compression, Distraction, and R [place]ling     Manual Techniques improve symptoms (per patient)          Neuro Exam:     Headaches   Patient reports headaches: Yes  Precautions: hx of headaches, chronic neck pain,       Manuals 6/24            Distraction             SOR             STM bilateral traps                          Neuro Re-Ed                                                                                                        Ther Ex             3 Finger Rotation 2x15 each            Upper Trap Stretch 10"x10 HEP            Cervical Retraction 2x10 HEP            Seated Thoracic Ext 5"x10 HEP            DNF             Self SOR                                        Ther Activity                                       Gait Training                                       Modalities

## 2021-07-01 ENCOUNTER — OFFICE VISIT (OUTPATIENT)
Dept: PHYSICAL THERAPY | Facility: REHABILITATION | Age: 46
End: 2021-07-01
Payer: COMMERCIAL

## 2021-07-01 DIAGNOSIS — M54.12 CERVICAL RADICULOPATHY: Primary | ICD-10-CM

## 2021-07-01 PROCEDURE — 97110 THERAPEUTIC EXERCISES: CPT

## 2021-07-01 PROCEDURE — 97140 MANUAL THERAPY 1/> REGIONS: CPT

## 2021-07-01 NOTE — PROGRESS NOTES
Daily Note     Today's date: 2021  Patient name: Karol Retana  : 1975  MRN: [id number]  Referring provider: Eloy Sen MD  Dx:   Encounter Diagnosis     ICD-10-CM    1  Cervical radiculopathy  M54 12        Start Time: 1130  Stop Time: 1215  Total time in clinic (min): 45 minutes    Subjective: Patient reports improvements in neck pain after last visit  Objective: See treatment diary below      Assessment: Tolerated treatment well  Patient would benefit from continued PT      Plan: Continue per plan of care        Precautions: hx of headaches, chronic neck pain,       Manuals            Distraction             SOR  AK           STM bilateral traps  AK                        Neuro Re-Ed             Wall Slides  2x10           Theraband Ext/Rows  PTB x20                                                                            Ther Ex             3 Finger Rotation 2x15 each            Upper Trap Stretch 10"x10 HEP            Cervical Retraction 2x10 HEP            Seated Thoracic Ext 5"x10 HEP            DNF  5"x20           Self SOR              Self MWM R rotation  x10 towel at C6-7                        Ther Activity                                       Gait Training                                       Modalities

## 2021-07-08 ENCOUNTER — OFFICE VISIT (OUTPATIENT)
Dept: PHYSICAL THERAPY | Facility: REHABILITATION | Age: 46
End: 2021-07-08
Payer: COMMERCIAL

## 2021-07-08 DIAGNOSIS — M54.12 CERVICAL RADICULOPATHY: Primary | ICD-10-CM

## 2021-07-08 PROCEDURE — 97140 MANUAL THERAPY 1/> REGIONS: CPT

## 2021-07-08 PROCEDURE — 97110 THERAPEUTIC EXERCISES: CPT

## 2021-07-08 NOTE — PROGRESS NOTES
Daily Note     Today's date: 2021  Patient name: Pau Brantley  : 1975  MRN: [id number]  Referring provider: Jeanie Dill MD  Dx:   Encounter Diagnosis     ICD-10-CM    1  Cervical radiculopathy  M54 12        Start Time: 366  Stop Time: 121  Total time in clinic (min): 40 minutes    Subjective: Patient reports tightness in neck after waking up  Objective: See treatment diary below      Assessment:   Patient reports improvements in neck pain following HEP  States pain is worsened in the am following sleeping  Thorough discussion today regarding sleep positions and adjustments  Added self SOR release at home  Patient symptoms improved end of session  Plan: Continue per plan of care        Precautions: hx of headaches, chronic neck pain,       Manuals           Distraction             SOR  HI HI          STM bilateral traps  HI           Laser   HI 5'           Neuro Re-Ed             Wall Slides  2x10           Theraband Ext/Rows  PTB x20                                                                            Ther Ex             3 Finger Rotation 2x15 each            Upper Trap Stretch 10"x10 HEP            Cervical Retraction 2x10 HEP            Seated Thoracic Ext 5"x10 HEP            DNF  5"x20           Self SOR              Self MWM R rotation  x10 towel at C6-7           Self SOR w/ [employer]   1' x5          UBE   3'/3'          Patient Education and Update HEP   25'          Ther Activity                                       Gait Training                                       Modalities

## 2021-07-15 ENCOUNTER — APPOINTMENT (OUTPATIENT)
Dept: PHYSICAL THERAPY | Facility: REHABILITATION | Age: 46
End: 2021-07-15
Payer: COMMERCIAL

## 2021-07-22 ENCOUNTER — APPOINTMENT (OUTPATIENT)
Dept: PHYSICAL THERAPY | Facility: REHABILITATION | Age: 46
End: 2021-07-22
Payer: COMMERCIAL

## 2021-07-28 ENCOUNTER — HOSPITAL ENCOUNTER (OUTPATIENT)
Dept: MAMMOGRAPHY | Facility: IMAGING CENTER | Age: 46
Discharge: HOME | End: 2021-07-28
Payer: COMMERCIAL

## 2021-07-28 VITALS — BODY MASS INDEX: 27.64 KG/M2 | WEIGHT: 172 LBS | HEIGHT: 66 IN

## 2021-07-28 DIAGNOSIS — Z12.31 SCREENING MAMMOGRAM, ENCOUNTER FOR: ICD-10-CM

## 2021-07-28 PROCEDURE — 77067 SCR MAMMO BI INCL CAD: CPT

## 2021-07-28 PROCEDURE — 77063 BREAST TOMOSYNTHESIS BI: CPT

## 2021-08-06 ENCOUNTER — TELEPHONE (OUTPATIENT)
Dept: OTHER | Facility: OTHER | Age: 46
End: 2021-08-06

## 2021-08-06 NOTE — TELEPHONE ENCOUNTER
Patient called to cancel appointment schedule for today Friday Aug 6, 2021  8:20 AM  NEW PATIENT [facility]  Ruby Gomes MD  [facility]  Due to she went to the [place] office by mistake and will be very late to [place]  She would like a call back to reschedule and would like to go to the [facility] office

## 2021-08-11 ENCOUNTER — TELEPHONE (OUTPATIENT)
Dept: FAMILY MEDICINE CLINIC | Facility: CLINIC | Age: 46
End: 2021-08-11

## 2021-08-11 NOTE — TELEPHONE ENCOUNTER
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

## 2021-08-11 NOTE — TELEPHONE ENCOUNTER
She ready has a chest x-ray ordered in the system, previously ordered by Dr Lamont Santoyo  She can go ahead and proceed with obtaining the chest x-ray

## 2021-08-13 ENCOUNTER — HOSPITAL ENCOUNTER (OUTPATIENT)
Dept: RADIOLOGY | Facility: HOSPITAL | Age: 46
Discharge: HOME | End: 2021-08-13
Payer: COMMERCIAL

## 2021-08-13 DIAGNOSIS — R93.89 ABNORMAL CHEST X-RAY: ICD-10-CM

## 2021-08-13 DIAGNOSIS — U07.1 COVID-19 VIRUS INFECTION: ICD-10-CM

## 2021-08-13 DIAGNOSIS — Z11.1 PPD SCREENING TEST: ICD-10-CM

## 2021-08-13 PROCEDURE — 71046 X-RAY EXAM CHEST 2 VIEWS: CPT

## 2021-08-18 NOTE — PROGRESS NOTES
Discharge Note  Patient has not returned to PT in over a month at this time  Patient seen by PT 3x times with good improvements in neck pain and function  PT spoke to patient and patient states that she is was doing well and was able to manage symptoms with HEP  Patient will be discharged at this time, will need a new script to return in the future

## 2021-08-25 ENCOUNTER — OFFICE VISIT (OUTPATIENT)
Dept: FAMILY MEDICINE CLINIC | Facility: CLINIC | Age: 46
End: 2021-08-25
Payer: COMMERCIAL

## 2021-08-25 VITALS
HEART RATE: 72 BPM | SYSTOLIC BLOOD PRESSURE: 100 MMHG | WEIGHT: 175.25 LBS | HEIGHT: 66 IN | BODY MASS INDEX: 28.16 KG/M2 | RESPIRATION RATE: 18 BRPM | DIASTOLIC BLOOD PRESSURE: 68 MMHG | TEMPERATURE: 97.8 F | OXYGEN SATURATION: 98 %

## 2021-08-25 DIAGNOSIS — E11.9 TYPE 2 DIABETES MELLITUS WITHOUT COMPLICATION, WITHOUT LONG-TERM CURRENT USE OF INSULIN (HCC): ICD-10-CM

## 2021-08-25 DIAGNOSIS — M54.12 CERVICAL RADICULOPATHY: ICD-10-CM

## 2021-08-25 DIAGNOSIS — L60.0 INGROWN NAIL OF GREAT TOE OF RIGHT FOOT: ICD-10-CM

## 2021-08-25 DIAGNOSIS — G89.29 CHRONIC LEFT SHOULDER PAIN: ICD-10-CM

## 2021-08-25 DIAGNOSIS — R07.9 CHEST PAIN, UNSPECIFIED TYPE: Primary | ICD-10-CM

## 2021-08-25 DIAGNOSIS — M25.512 CHRONIC LEFT SHOULDER PAIN: ICD-10-CM

## 2021-08-25 PROBLEM — U07.1 COVID-19 VIRUS INFECTION: Status: RESOLVED | Noted: 2020-11-15 | Resolved: 2021-08-25

## 2021-08-25 LAB
CREAT UR-MCNC: 105 MG/DL
MICROALBUMIN UR-MCNC: 28.1 MG/L (ref 0–20)
MICROALBUMIN/CREAT 24H UR: 27 MG/G CREATININE (ref 0–30)

## 2021-08-25 PROCEDURE — 93000 ELECTROCARDIOGRAM COMPLETE: CPT | Performed by: NURSE PRACTITIONER

## 2021-08-25 PROCEDURE — 82043 UR ALBUMIN QUANTITATIVE: CPT | Performed by: NURSE PRACTITIONER

## 2021-08-25 PROCEDURE — 3008F BODY MASS INDEX DOCD: CPT | Performed by: NURSE PRACTITIONER

## 2021-08-25 PROCEDURE — 82570 ASSAY OF URINE CREATININE: CPT | Performed by: NURSE PRACTITIONER

## 2021-08-25 PROCEDURE — 99214 OFFICE O/P EST MOD 30 MIN: CPT | Performed by: NURSE PRACTITIONER

## 2021-08-25 PROCEDURE — 3061F NEG MICROALBUMINURIA REV: CPT | Performed by: NURSE PRACTITIONER

## 2021-08-25 PROCEDURE — 1036F TOBACCO NON-USER: CPT | Performed by: NURSE PRACTITIONER

## 2021-08-25 NOTE — PROGRESS NOTES
FAMILY PRACTICE OFFICE VISIT       NAME: Alexandra Paredes  AGE: 51 y o  SEX: female       : 1975        MRN: [id number]        Assessment and Plan     Problem List Items Addressed This Visit        Endocrine    Type 2 diabetes mellitus without complication, without long-term current use of insulin ([address] )    Relevant Orders    Microalbumin / creatinine urine ratio (Completed)       Nervous and Auditory    Cervical radiculopathy      Other Visit Diagnoses     Chest pain, unspecified type    -  Primary    Relevant Orders    POCT ECG (Completed)    Chronic left shoulder pain        Relevant Orders    XR shoulder 2+ vw left    Ingrown nail of great toe of right foot        Relevant Orders    Ambulatory referral to Podiatry        1  Chest pain, unspecified type  POCT ECG   2  Chronic left shoulder pain  XR shoulder 2+ vw left   3  Cervical radiculopathy     4  Ingrown nail of great toe of right foot  Ambulatory referral to Podiatry   5  Type 2 diabetes mellitus without complication, without long-term current use of insulin ([facility])  Microalbumin / creatinine urine ratio     This 51year old female presents today for left upper chest wall pain, left shoulder pain and neck pain  Shoulder and neck pain ongoing for 4 years  Upper chest wall pain has been present for 2-3 weeks  Pain improves with massage, and is reproducible with palpation  In office ECG today, NSR, normal axis, no acute ischemia, no significant changes compared to 2018  Unremarkable stress test and echo in 2018  Unremarkable left shoulder, clavicle scapulae x-rays 2018  MRI cervical spine completed in 2019  ALIGNMENT:    Straightening of normal lordosis suggesting muscle spasm  No evidence of subluxation  No evidence of fracture  No evidence of scoliosis  MARROW SIGNAL:  Normal marrow signal is identified within the visualized bony structures  No discrete marrow lesion    CERVICAL AND VISUALIZED THORACIC CORD:  Normal signal within the visualized cord  PREVERTEBRAL AND PARASPINAL SOFT TISSUES:  Normal   VISUALIZED POSTERIOR FOSSA:  The visualized posterior fossa demonstrates no abnormal signal   CERVICAL DISC SPACES:  C2-C3:  Normal   C3-C4:  Disc osteophyte complex, bulging annulus, mild central canal stenosis, mild right foraminal stenosis, moderate left foraminal stenosis, possible left C4 nerve root encroachment  C4-C5:  Mild degenerative spondylosis, mild bilateral foraminal stenosis, small right-sided disc protrusion, possible right C5 nerve root encroachment  C5-C6:  Disc osteophyte complex, moderate bilateral foraminal stenosis, moderate right central spinal stenosis, probable right C6 nerve root encroachment, possible left C6 nerve root encroachment  C6-C7:  Mild to moderate degenerative spondylosis, mild bilateral foraminal stenosis, mild central canal stenosis, small to moderate left-sided disc protrusion, probable left C7 nerve root encroachment  C7-T1:  Normal   UPPER THORACIC DISC SPACES:  Normal   IMPRESSION:  Multilevel degenerative spondylosis, consistent with x-rays  Straightening of normal lordosis suspicious for muscle spasm  Mild central canal stenosis, mild right foraminal stenosis, moderate left foraminal stenosis  C3-4  Mild bilateral foraminal stenosis, small right-sided disc protrusion C4-5  Moderate bilateral foraminal stenosis, moderate right central spinal stenosis C5-6  Mild central canal and bilateral foraminal stenosis,, small to moderate left-sided disc protrusion  C6-7     Suspect pain is caused by cervical radiculopathy, explained pathophysiology of how degenerative disc disease, compressed nerves can cause symptoms in left shoulder, arm, and hand  She requests work up of her left shoulder, as she continues to believe pain is originating in her shoulder  Will complete left shoulder x-ray  I continue to recommend follow up with pain management as previously recommended by Dr Bc Matos   Reprinted referral for Dr Hyun Umanzor  Right great toe ingrown toe nail: recurrent, will refer to podiatry  Contact information provided  Diabetes: foot exam completed today  Urine for micoralbumin collected today  A1c stable at 6 2%  Continue metformin  Will be due for repeat in November  Chief Complaint     Chief Complaint   Patient presents with    Chest Pain     getting worse     Diabetes     FOOT EXAM SHOES/SOCKS  REMOVE         History of Present Illness     Mami Caro is a 51year old female presenting today for left anterior shoulder/chest wall pain  It is uncomfortable  Can't lay on this side  This pain has been ongoing for about 2-3 weeks now  She has left posterior shoulder pain and neck pain that has been ongoing for about 4 years  She has been evaluated with left shoulder x-rays, clavicle x-rays, scapula x-rays and cervical x-rays and MRI in the past   She was following with Pain Management, Dr Zenaida Gerard  She has been to physical therapy  She was not satisfied with physical therapy because they were focusing on her neck, and she wanted them to focus on her shoulder  Pain Management recommended injection, she did not want to proceed with the injection, so did not return  Yesterday, had more severe shoulder pain, massage by family was helpful  Took diclofenac, did not help    Using a roller on her back on the floor yesterday was helpful also  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, [facility]  Disagrees that pain is coming from neck feels her pain is coming from her shoulder  No breast pain  No shortness of breath  No recent cold symptoms or illness  Alternative pain management physician recommended in the past by Dr Nola Modi, which she has not followed up with  Right great toe, recurrent ingrown toe nail  Review of Systems   Review of Systems   Constitutional: Negative  Musculoskeletal: Positive for arthralgias (left shoulder pain), neck pain and neck stiffness  Active Problem List     Patient Active Problem List   Diagnosis    Hypertriglyceridemia    Vitamin D deficiency    Generalized anxiety disorder    Insomnia    Vitamin B12 deficiency    Cervical radiculopathy    Myofascial pain    Migraine without aura and without status migrainosus, not intractable    DDD (degenerative disc disease), cervical    Spondylosis of cervical spine without myelopathy    Chronic pain of left knee    Hip pain    Greater trochanteric pain syndrome of left lower extremity    Abnormal chest x-ray    Type 2 diabetes mellitus without complication, without long-term current use of insulin (HCC)       Past Medical History:  Past Medical History:   Diagnosis Date    COVID-19 virus infection 11/15/2020       Past Surgical History:  Past Surgical History:   Procedure Laterality Date     SECTION      CHOLECYSTECTOMY      TOTAL ABDOMINAL HYSTERECTOMY      Due to fibroids   Ovaries present       Family History:  Family History   Problem Relation Age of Onset    Diabetes Mother     Hypertension Mother     Heart attack Mother     Stroke Father     No Known Problems Daughter     No Known Problems Maternal Grandmother     No Known Problems Maternal Grandfather     No Known Problems Paternal Grandmother     No Known Problems Paternal Grandfather     No Known Problems Maternal Aunt     No Known Problems Maternal Aunt     No Known Problems Maternal Aunt     No Known Problems Paternal Aunt     No Known Problems Paternal Aunt     No Known Problems Paternal Aunt     No Known Problems Paternal Aunt     No Known Problems Paternal Aunt        Social History:  Social History     Socioeconomic History    Marital status: /Civil Union     Spouse name: Not on file    Number of children: Not on file    Years of education: Not on file    Highest education level: Not on file   Occupational History    Not on file   Tobacco Use    Smoking status: Never Smoker    Smokeless tobacco: Never Used   Vaping Use    Vaping Use: Never used   Substance and Sexual Activity    Alcohol use: No    Drug use: No    Sexual activity: Not on file   Other Topics Concern    Not on file   Social History Narrative    Caffeine use    Description: Tea 1 cup     Social Determinants of Health     Financial Resource Strain:     Difficulty of Paying Living Expenses:    Food Insecurity:     Worried About Running Out of Food in the Last Year:     Ran Out of Food in the Last Year:    Transportation Needs:     Lack of Transportation (Medical):  Lack of Transportation (Non-Medical):    Physical Activity:     Days of Exercise per Week:     Minutes of Exercise per Session:    Stress:     Feeling of Stress :    Social Connections:     Frequency of Communication with Friends and Family:     Frequency of Social Gatherings with Friends and Family:     Attends [employer] Services:     Active Member of Clubs or Organizations:     Attends Club or Organization Meetings:     Marital Status:    Intimate Partner Violence:     Fear of Current or Ex-Partner:     Emotionally Abused:     Physically Abused:     Sexually Abused:        I have reviewed the patient's medical history in detail; there are no changes to the history as noted in the electronic medical record  Objective     Vitals:    08/25/21 1048   BP: 100/68   Pulse: 72   Resp: 18   Temp: 97 8 °F (36 6 °C)   SpO2: 98%   Weight: 79 5 kg (175 lb 4 oz)   Height: 5' 6" (1 676 m)     Wt Readings from Last 3 Encounters:   08/25/21 79 5 kg (175 lb 4 oz)   07/28/21 78 kg (172 lb)   05/05/21 80 7 kg (178 lb)     Physical Exam  Vitals and nursing note reviewed  Constitutional:       General: She is not in acute distress  Appearance: She is well-developed  She is not ill-appearing  Cardiovascular:      Rate and Rhythm: Normal rate and regular rhythm        Pulses: no weak pulses          Dorsalis pedis pulses are 2+ on the right side and 2+ on the left side  Posterior tibial pulses are 2+ on the right side and 2+ on the left side  Heart sounds: No murmur heard  Pulmonary:      Effort: Pulmonary effort is normal  No respiratory distress  Breath sounds: Normal breath sounds  No wheezing or rales  Chest:          Comments: Reproducible tenderness  Musculoskeletal:      Left shoulder: No swelling, deformity, tenderness or crepitus  Normal range of motion  Normal strength  Normal pulse  Cervical back: Spasms (left ) present  No swelling, edema, deformity or erythema  Pain with movement (in all movement, but looking left is most painful ) present  Normal range of motion  Feet:      Right foot:      Skin integrity: Dry skin present  No ulcer, skin breakdown, erythema, warmth or callus  Left foot:      Skin integrity: Dry skin present  No ulcer, skin breakdown, erythema, warmth or callus  Skin:     General: Skin is warm and dry  Capillary Refill: Capillary refill takes less than 2 seconds  Findings: No rash  Neurological:      Mental Status: She is alert  Sensory: No sensory deficit  Psychiatric:         Mood and Affect: Mood normal           Diabetic Foot Exam    Patient's shoes and socks removed  Right Foot/Ankle   Right Foot Inspection  Skin Exam: skin normal, skin intact and dry skin no warmth, no callus, no erythema, no maceration, no abnormal color, no pre-ulcer, no ulcer and no callus                          Toe Exam: ROM and strength within normal limits  Sensory     Proprioception: intact   Monofilament testing: intact  Vascular  Capillary refills: < 3 seconds  The right DP pulse is 2+  The right PT pulse is 2+       Left Foot/Ankle  Left Foot Inspection  Skin Exam: skin normal, skin intact and dry skinno warmth, no erythema, no maceration, normal color, no pre-ulcer, no ulcer and no callus                         Toe Exam: ROM and strength within normal limits                   Sensory Proprioception: intact  Monofilament: intact  Vascular  Capillary refills: < 3 seconds  The left DP pulse is 2+  The left PT pulse is 2+  Assign Risk Category:  No deformity present; No loss of protective sensation; No weak pulses       Risk: 0    ALLERGIES:  Allergies   Allergen Reactions    Bee Pollen Itching     Itchy, runny nose    Dust Mite Extract Itching     Itchy, runny nose    Pollen Extract Itching     Itchy, runny nose       Current Medications     Current Outpatient Medications   Medication Sig Dispense Refill    acetaminophen (TYLENOL) 500 mg tablet Take 500 mg by mouth as needed        clotrimazole-betamethasone (LOTRISONE) 1-0 05 % cream Apply topically 2 (two) times a day 45 g 3    cyanocobalamin (VITAMIN B-12) 1,000 mcg tablet Take 2,000 mcg by mouth daily      diclofenac sodium (VOLTAREN) 50 mg EC tablet Take 1 tablet (50 mg total) by mouth 2 (two) times a day 60 tablet 1    ergocalciferol (VITAMIN D2) 50,000 units Take 1 capsule (50,000 Units total) by mouth once a week 12 capsule 0    FREESTYLE LITE test strip USE TO TEST BLOOD SUGAR AS DIRECTED 100 each 5    metFORMIN (GLUCOPHAGE) 500 mg tablet Take 1 tablet (500 mg total) by mouth daily with breakfast 90 tablet 3    ibuprofen (MOTRIN) 800 mg tablet Take 1 tablet (800 mg total) by mouth 3 (three) times a day (Patient not taking: Reported on 5/5/2021) 21 tablet 0     No current facility-administered medications for this visit           Health Maintenance     Health Maintenance   Topic Date Due    Hepatitis C Screening  Never done    Pneumococcal Vaccine: Pediatrics (0 to 5 Years) and At-Risk Patients (6 to 59 Years) (1 of 2 - PPSV23) Never done    DM Eye Exam  Never done    HIV Screening  Never done    Influenza Vaccine (1) 09/01/2021    HEMOGLOBIN A1C  11/18/2021    Depression Screening PHQ  05/05/2022    Annual Physical  05/05/2022    BMI: Followup Plan  05/08/2022    BMI: Adult  08/25/2022    URINE MICROALBUMIN 08/25/2022    Diabetic Foot Exam  08/26/2022    Breast Cancer Screening: Mammogram  07/28/2023    DTaP,Tdap,and Td Vaccines (3 - Td or Tdap) 03/09/2026    COVID-19 Vaccine  Completed    HIB Vaccine  Aged Out    Hepatitis B Vaccine  Aged Out    IPV Vaccine  Aged Out    Hepatitis A Vaccine  Aged Out    Meningococcal ACWY Vaccine  Aged Out    HPV Vaccine  Aged Out     Immunization History   Administered Date(s) Administered    Fluzone Split Quad 0 5 mL 11/09/2015    INFLUENZA 11/09/2015, 11/01/2018    SARS-CoV-2 / COVID-19 mRNA IM ([facility]) 04/22/2021, 05/21/2021    Td (adult), adsorbed 03/09/2016    Tdap 01/01/2016       CLAYTON Field

## 2021-09-08 ENCOUNTER — APPOINTMENT (EMERGENCY)
Dept: RADIOLOGY | Facility: HOSPITAL | Age: 46
End: 2021-09-08
Payer: COMMERCIAL

## 2021-09-08 ENCOUNTER — HOSPITAL ENCOUNTER (EMERGENCY)
Facility: HOSPITAL | Age: 46
Discharge: HOME | End: 2021-09-08
Attending: EMERGENCY MEDICINE
Payer: COMMERCIAL

## 2021-09-08 VITALS
HEART RATE: 83 BPM | OXYGEN SATURATION: 97 % | RESPIRATION RATE: 18 BRPM | WEIGHT: 176.37 LBS | SYSTOLIC BLOOD PRESSURE: 121 MMHG | DIASTOLIC BLOOD PRESSURE: 79 MMHG | BODY MASS INDEX: 28.47 KG/M2 | TEMPERATURE: 98.7 F

## 2021-09-08 DIAGNOSIS — M62.838 MUSCLE SPASM: Primary | ICD-10-CM

## 2021-09-08 DIAGNOSIS — M79.18 MYOFASCIAL MUSCLE PAIN: ICD-10-CM

## 2021-09-08 LAB
ANION GAP SERPL CALCULATED.3IONS-SCNC: 4 MMOL/L (ref 4–13)
ATRIAL RATE: 76 BPM
BASOPHILS # BLD AUTO: 0.05 THOUSANDS/ΜL (ref 0–0.1)
BASOPHILS NFR BLD AUTO: 1 % (ref 0–1)
BUN SERPL-MCNC: 5 MG/DL (ref 5–25)
CALCIUM SERPL-MCNC: 9.3 MG/DL (ref 8.3–10.1)
CHLORIDE SERPL-SCNC: 106 MMOL/L (ref 100–108)
CO2 SERPL-SCNC: 26 MMOL/L (ref 21–32)
CREAT SERPL-MCNC: 0.56 MG/DL (ref 0.6–1.3)
EOSINOPHIL # BLD AUTO: 0.21 THOUSAND/ΜL (ref 0–0.61)
EOSINOPHIL NFR BLD AUTO: 3 % (ref 0–6)
ERYTHROCYTE [DISTWIDTH] IN BLOOD BY AUTOMATED COUNT: 12.3 % (ref 11.6–15.1)
GFR SERPL CREATININE-BSD FRML MDRD: 111 ML/MIN/1.73SQ M
GLUCOSE SERPL-MCNC: 118 MG/DL (ref 65–140)
HCT VFR BLD AUTO: 39.3 % (ref 34.8–46.1)
HGB BLD-MCNC: 14 G/DL (ref 11.5–15.4)
IMM GRANULOCYTES # BLD AUTO: 0.02 THOUSAND/UL (ref 0–0.2)
IMM GRANULOCYTES NFR BLD AUTO: 0 % (ref 0–2)
LYMPHOCYTES # BLD AUTO: 2.09 THOUSANDS/ΜL (ref 0.6–4.47)
LYMPHOCYTES NFR BLD AUTO: 33 % (ref 14–44)
MCH RBC QN AUTO: 29.8 PG (ref 26.8–34.3)
MCHC RBC AUTO-ENTMCNC: 35.6 G/DL (ref 31.4–37.4)
MCV RBC AUTO: 84 FL (ref 82–98)
MONOCYTES # BLD AUTO: 0.62 THOUSAND/ΜL (ref 0.17–1.22)
MONOCYTES NFR BLD AUTO: 10 % (ref 4–12)
NEUTROPHILS # BLD AUTO: 3.44 THOUSANDS/ΜL (ref 1.85–7.62)
NEUTS SEG NFR BLD AUTO: 53 % (ref 43–75)
NRBC BLD AUTO-RTO: 0 /100 WBCS
P AXIS: 46 DEGREES
PLATELET # BLD AUTO: 211 THOUSANDS/UL (ref 149–390)
PMV BLD AUTO: 10.7 FL (ref 8.9–12.7)
POTASSIUM SERPL-SCNC: 4 MMOL/L (ref 3.5–5.3)
PR INTERVAL: 134 MS
QRS AXIS: 55 DEGREES
QRSD INTERVAL: 82 MS
QT INTERVAL: 368 MS
QTC INTERVAL: 414 MS
RBC # BLD AUTO: 4.7 MILLION/UL (ref 3.81–5.12)
SODIUM SERPL-SCNC: 136 MMOL/L (ref 136–145)
T WAVE AXIS: 86 DEGREES
TROPONIN I SERPL-MCNC: <0.02 NG/ML
VENTRICULAR RATE: 76 BPM
WBC # BLD AUTO: 6.43 THOUSAND/UL (ref 4.31–10.16)

## 2021-09-08 PROCEDURE — 71045 X-RAY EXAM CHEST 1 VIEW: CPT

## 2021-09-08 PROCEDURE — 99284 EMERGENCY DEPT VISIT MOD MDM: CPT | Performed by: EMERGENCY MEDICINE

## 2021-09-08 PROCEDURE — 99284 EMERGENCY DEPT VISIT MOD MDM: CPT

## 2021-09-08 PROCEDURE — 80048 BASIC METABOLIC PNL TOTAL CA: CPT | Performed by: STUDENT IN AN ORGANIZED HEALTH CARE EDUCATION/TRAINING PROGRAM

## 2021-09-08 PROCEDURE — 93005 ELECTROCARDIOGRAM TRACING: CPT

## 2021-09-08 PROCEDURE — 36415 COLL VENOUS BLD VENIPUNCTURE: CPT | Performed by: STUDENT IN AN ORGANIZED HEALTH CARE EDUCATION/TRAINING PROGRAM

## 2021-09-08 PROCEDURE — 93010 ELECTROCARDIOGRAM REPORT: CPT | Performed by: INTERNAL MEDICINE

## 2021-09-08 PROCEDURE — 85025 COMPLETE CBC W/AUTO DIFF WBC: CPT | Performed by: STUDENT IN AN ORGANIZED HEALTH CARE EDUCATION/TRAINING PROGRAM

## 2021-09-08 PROCEDURE — 73030 X-RAY EXAM OF SHOULDER: CPT

## 2021-09-08 PROCEDURE — 84484 ASSAY OF TROPONIN QUANT: CPT | Performed by: STUDENT IN AN ORGANIZED HEALTH CARE EDUCATION/TRAINING PROGRAM

## 2021-09-08 RX ORDER — METHOCARBAMOL 500 MG/1
500 TABLET, FILM COATED ORAL EVERY 6 HOURS PRN
Status: CANCELLED | OUTPATIENT
Start: 2021-09-08

## 2021-09-08 RX ORDER — METHOCARBAMOL 500 MG/1
500 TABLET, FILM COATED ORAL 4 TIMES DAILY
Qty: 28 TABLET | Refills: 0 | Status: SHIPPED | OUTPATIENT
Start: 2021-09-08 | End: 2021-10-25

## 2021-09-08 NOTE — ED TRIAGE NOTES
Pt reports having left shoulder pain and off for 2 months, being seen by PCP for same  Had EKG done 2 weeks ago- continues with pain and now TA with steps  Pt able to speak in full sentences

## 2021-09-08 NOTE — ED NOTES
EKG done at 0957  Dr Leia Juarez notified        Sancho Garza, RN  09/08/21 [address] [facility] Ziggy KARELY Mcfadden  09/08/21 1003

## 2021-09-08 NOTE — ED PROVIDER NOTES
History  Chief Complaint   Patient presents with    Shoulder Pain     left side, with CP     51 PMH non insulin dependent Type 2 DM, hypertriglyceridemia, cervical radiculopathy currently undergoing physical therapy, recently seen by her PCP for similar complaints of worsening left shoulder pain and anterior chest wall pain, presents to the ED with left anterior chest pain and worsening dyspnea, dizziness  Patient describes this L shoulder pain as constant, deep, sharp worsened with movement and reproducible with palpation on the left anterior shoulder region, anterior chest wall  Patient states that this pain is unchanged from 3 weeks prior, but that the diclofenac she was prescribed did not resolve her symptoms  Her PCP also ordered a left shoulder x-ray which was not completed, ECG at that time was completed and was normal   Patient describes that in the last few days she has had palpitations, shortness of breath with activity including walking up the stairs  She feels that this is unrelated to her chest wall discomfort, which is constant and unchanging  Patient otherwise feels well, denies fevers, chills, N/V/D, or other associated symptoms  No evidence of overlying rash or erythema  Prior to Admission Medications   Prescriptions Last Dose Informant Patient Reported? Taking?    FREESTYLE LITE test strip   No No   Sig: USE TO TEST BLOOD SUGAR AS DIRECTED   acetaminophen (TYLENOL) 500 mg tablet  Self Yes No   Sig: Take 500 mg by mouth as needed     clotrimazole-betamethasone (LOTRISONE) 1-0 05 % cream   No No   Sig: Apply topically 2 (two) times a day   cyanocobalamin (VITAMIN B-12) 1,000 mcg tablet  Self Yes No   Sig: Take 2,000 mcg by mouth daily   diclofenac sodium (VOLTAREN) 50 mg EC tablet   No No   Sig: Take 1 tablet (50 mg total) by mouth 2 (two) times a day   ergocalciferol (VITAMIN D2) 50,000 units   No No   Sig: Take 1 capsule (50,000 Units total) by mouth once a week   ibuprofen (MOTRIN) 800 mg tablet   No No   Sig: Take 1 tablet (800 mg total) by mouth 3 (three) times a day   Patient not taking: Reported on 2021   metFORMIN (GLUCOPHAGE) 500 mg tablet   No No   Sig: Take 1 tablet (500 mg total) by mouth daily with breakfast      Facility-Administered Medications: None       Past Medical History:   Diagnosis Date    COVID-19 virus infection 11/15/2020       Past Surgical History:   Procedure Laterality Date     SECTION      CHOLECYSTECTOMY      TOTAL ABDOMINAL HYSTERECTOMY      Due to fibroids  Ovaries present       Family History   Problem Relation Age of Onset    Diabetes Mother     Hypertension Mother     Heart attack Mother     Stroke Father     No Known Problems Daughter     No Known Problems Maternal Grandmother     No Known Problems Maternal Grandfather     No Known Problems Paternal Grandmother     No Known Problems Paternal Grandfather     No Known Problems Maternal Aunt     No Known Problems Maternal Aunt     No Known Problems Maternal Aunt     No Known Problems Paternal Aunt     No Known Problems Paternal Aunt     No Known Problems Paternal Aunt     No Known Problems Paternal Aunt     No Known Problems Paternal Aunt      I have reviewed and agree with the history as documented  E-Cigarette/Vaping    E-Cigarette Use Never User      E-Cigarette/Vaping Substances    Nicotine No     THC No     CBD No     Flavoring No     Other No     Unknown No      Social History     Tobacco Use    Smoking status: Never Smoker    Smokeless tobacco: Never Used   Vaping Use    Vaping Use: Never used   Substance Use Topics    Alcohol use: No    Drug use: No        Review of Systems   Constitutional: Positive for fatigue  Respiratory: Positive for chest tightness and shortness of breath  Negative for wheezing and stridor  Gastrointestinal: Negative for diarrhea and nausea  Musculoskeletal: Positive for arthralgias, back pain and myalgias     Neurological: Positive for dizziness, weakness and headaches  Physical Exam  ED Triage Vitals [09/08/21 0949]   Temperature Pulse Respirations Blood Pressure SpO2   98 7 °F (37 1 °C) 83 18 121/79 97 %      Temp Source Heart Rate Source Patient Position - Orthostatic VS BP Location FiO2 (%)   Tympanic Monitor Sitting Left arm --      Pain Score       6             Orthostatic Vital Signs  Vitals:    09/08/21 0949   BP: 121/79   Pulse: 83   Patient Position - Orthostatic VS: Sitting       Physical Exam  Constitutional:       General: She is not in acute distress  Appearance: Normal appearance  She is not ill-appearing  HENT:      Head: Normocephalic and atraumatic  Mouth/Throat:      Mouth: Mucous membranes are moist    Eyes:      Conjunctiva/sclera: Conjunctivae normal    Cardiovascular:      Rate and Rhythm: Normal rate  Pulmonary:      Effort: Pulmonary effort is normal    Abdominal:      General: Bowel sounds are normal       Palpations: Abdomen is soft  Musculoskeletal:      Right lower leg: No edema  Left lower leg: No edema  Comments: TTP left anterior shoulder/chest wall  Left shoulder with full ROM  TTP cervical spinal region, left scapular region   Skin:     General: Skin is warm and dry  Comments: Acanthosis nigricans, posterior neck     No left shoulder rash    Neurological:      Mental Status: She is alert and oriented to person, place, and time        Comments: L arm weakness, 4/5          ED Medications  Medications - No data to display    Diagnostic Studies  Results Reviewed     Procedure Component Value Units Date/Time    Troponin I [[id number]]  (Normal) Collected: 09/08/21 1047    Lab Status: Final result Specimen: Blood from Arm, Right Updated: 09/08/21 1122     Troponin I <0 02 ng/mL     Basic metabolic panel [[id number]]  (Abnormal) Collected: 09/08/21 1046    Lab Status: Final result Specimen: Blood from Arm, Right Updated: 09/08/21 1116     Sodium 136 mmol/L Potassium 4 0 mmol/L      Chloride 106 mmol/L      CO2 26 mmol/L      ANION GAP 4 mmol/L      BUN 5 mg/dL      Creatinine 0 56 mg/dL      Glucose 118 mg/dL      Calcium 9 3 mg/dL      eGFR 111 ml/min/1 73sq m     Narrative:      [place] guidelines for Chronic Kidney Disease (CKD):     Stage 1 with normal or high GFR (GFR > 90 mL/min/1 73 square meters)    Stage 2 Mild CKD (GFR = 60-89 mL/min/1 73 square meters)    Stage 3A Moderate CKD (GFR = 45-59 mL/min/1 73 square meters)    Stage 3B Moderate CKD (GFR = 30-44 mL/min/1 73 square meters)    Stage 4 Severe CKD (GFR = 15-29 mL/min/1 73 square meters)    Stage 5 End Stage CKD (GFR <15 mL/min/1 73 square meters)  Note: GFR calculation is accurate only with a steady state creatinine    CBC and differential [[id number]] Collected: 09/08/21 1047    Lab Status: Final result Specimen: Blood from Arm, Right Updated: 09/08/21 1057     WBC 6 43 Thousand/uL      RBC 4 70 Million/uL      Hemoglobin 14 0 g/dL      Hematocrit 39 3 %      MCV 84 fL      MCH 29 8 pg      MCHC 35 6 g/dL      RDW 12 3 %      MPV 10 7 fL      Platelets 232 Thousands/uL      nRBC 0 /100 WBCs      Neutrophils Relative 53 %      Immat GRANS % 0 %      Lymphocytes Relative 33 %      Monocytes Relative 10 %      Eosinophils Relative 3 %      Basophils Relative 1 %      Neutrophils Absolute 3 44 Thousands/µL      Immature Grans Absolute 0 02 Thousand/uL      Lymphocytes Absolute 2 09 Thousands/µL      Monocytes Absolute 0 62 Thousand/µL      Eosinophils Absolute 0 21 Thousand/µL      Basophils Absolute 0 05 Thousands/µL                  XR chest portable   Final Result by Gideon Khoury MD (09/08 1103)      No acute cardiopulmonary disease  Findings are stable            Workstation performed: [id number]         XR shoulder 2+ views LEFT   Final Result by Gideon Khoury MD (09/08 1104)      No acute osseous abnormality        Findings are stable Workstation performed: [id number]               Procedures  Procedures      ED Course         SBIRT 20yo+      Most Recent Value   SBIRT (24 yo +)   In order to provide better care to our patients, we are screening all of our patients for alcohol and drug use  Would it be okay to ask you these screening questions? No Filed at: 09/08/2021 0742                [facility]  Number of Diagnoses or Management Options  Diagnosis management comments: Patient presented with concerns for Left anterior shoulder pain associated with dizziness, TA  Cardiac workup remarkable for ECG normal sinus rhythm, unchanged from prior, Troponin within normal limits, CXR with no evidence of acute cardiac/pulmonary disease  Left shoulder X-Ray showed no osseous abnormality  CBC within normal limits, BMP unremarkable  Patient has a low HEART score of 3, less than 2% risk of adverse cardiac event  Patient has been diagnosed with suspected muscle spasms, myofascial pain, secondary to cervical stenosis  Recommended outpatient follow up with PCP, pain management, provided ortho referral  Will provide 7 days of Robaxin for symptomatic relief          Amount and/or Complexity of Data Reviewed  Clinical lab tests: ordered        Disposition  Final diagnoses:   Muscle spasm   Myofascial muscle pain     Time reflects when diagnosis was documented in both MDM as applicable and the Disposition within this note     Time User Action Codes Description Comment    9/8/2021 11:25 AM Chasity Robert Add [M62 838] Trapezius muscle spasm     9/8/2021 11:26 AM Donnell Schroeder Fetvargas Add [M62 838] Muscle spasm     9/8/2021 11:26 AM Makayla Schroeder Modify [M62 838] Trapezius muscle spasm     9/8/2021 11:26 AM Makayla Schroeder Modify [M62 838] Muscle spasm     9/8/2021 11:26 AM Makayla Schroeder Remove [M62 838] Trapezius muscle spasm     9/8/2021 11:26 AM Donnell Schroeder Fetch Add [M79 18] Myofascial muscle pain       ED Disposition     ED Disposition Condition Date/Time Comment Discharge Stable Wed Sep 8, 2021 11:25 AM Toya Hernández discharge to home/self care  Follow-up Information     Follow up With Specialties Details Why Flaco Perdomo MD Family Medicine In 1 week As needed [address]            Patient's Medications   Discharge Prescriptions    METHOCARBAMOL (ROBAXIN) 500 MG TABLET    Take 1 tablet (500 mg total) by mouth 4 (four) times a day for 7 days       Start Date: 9/8/2021  End Date: 9/15/2021       Order Dose: 500 mg       Quantity: 28 tablet    Refills: 0     No discharge procedures on file  PDMP Review     None           ED Provider  Attending physically available and evaluated Toya Hernández  STEFANO managed the patient along with the ED Attending      Electronically Signed by    Kimberly Paz MD  [facility]  Internal Medicine Residency PGY-1       Kimberly Paz MD  09/08/21 9363

## 2021-09-08 NOTE — ED NOTES
Pt discharged by Resident, independent of nursing staff  IV removed by resident       Margarette Phillips, KARELY  09/08/21 7636

## 2021-09-08 NOTE — ED ATTENDING ATTESTATION
9/8/2021  IMercedes, DO, saw and evaluated the patient  I have discussed the patient with the resident/non-physician practitioner and agree with the resident's/non-physician practitioner's findings, Plan of Care, and MDM as documented in the resident's/non-physician practitioner's note, except where noted  All available labs and Radiology studies were reviewed  I was present for key portions of any procedure(s) performed by the resident/non-physician practitioner and I was immediately available to provide assistance  At this point I agree with the current assessment done in the Emergency Department  I have conducted an independent evaluation of this patient a history and physical is as follows:    Patient is a 50-year-old female tells me 3 years ago she suffered a work injury, which left her with some mild chronic left shoulder pain and neck pain  She underwent physical therapy, that pain is improved and relatively mild but still there pretty much all the time  For the past 2 months she has noticed that she had some worsening pain and discomfort with moving the shoulder around, as well as feeling like the chest wall on her left upper chest hurt more if she would move her shoulder around  There was no trauma or direct injuries recently that cause the chest wall discomfort  Says she was seen by her primary care physician, had an x-ray ordered but has not had that performed yet  Also says an ECG was done which was reportedly normal   She says the last 3 days she has noticed that the chest wall discomfort feels a little bit worse  She also felt that 2 days ago when she was walking up the steps she felt more short of breath and had a little bit of palpitations  She has no fever, no chills, no cough, no orthopnea  Exertion does not affect her chest wall discomfort    The chest wall discomfort has been constant for the past 3 days, it is not knife-like, ripping, or tearing, not pleuritic in nature  Patient denies any prolonged travel history, no recent long travel, no immobilizations, or hospitalizations  REVIEW OF SYSTEMS:   Constitutional:  No recent weight  gains or losses   Eyes:  No visual changes   ENT:  No tinnitus or hearing changes   Cardiac: As per HPI   Respiratory:  As per HPI   Abdominal:  No nausea or vomiting   Urinary: No dysuria or hematuria   Hematologic: No easy bruising or bleeding   Skin: No rash   Musculoskeletal: as per HPI   Neurologic: No weakness or sensory changes   Psychiatric: No mood changes      PHYSICAL EXAM:  General:  Patient is well-appearing  Head:  Atraumatic  Eyes:  Conjunctiva pink  ENT:  Mucous membranes are moist  Neck:  Supple, nontender  Cardiac:  S1-S2, without murmurs  Lungs:  Clear to auscultation bilaterally, patient has no chest wall rash or bony tenderness the chest   She does have some slight tenderness to palpation over her left upper chest wall pectoralis area  Pressing on this area reproduces her chest wall discomfort  Abdomen:  Soft, nontender, normal bowel sounds, no CVA tenderness, no tympany, no rigidity, no guarding  Extremities:  Left shoulder is atraumatic, no bony tenderness to the clavicle, acromioclavicular joint, humeral head, humerus, elbow, forearm, wrist or hand  She has slight discomfort with passive range of motion at the extremes of range of motion of her left shoulder but there is no true limitations  Strength is 5/5 in the bilateral shoulders, elbows, wrists    Normal range of motion, no pedal edema or calf asymmetry, radial pulses are equal and symmetric bilaterally  Neurologic:  Awake, fluent speech, normal comprehension, AAOx3  Skin:  Pink warm and dry  Psychiatric:  Alert, pleasant, cooperative      ED Course     ECG interpreted me, sinus rhythm, rate of 76, no ST segment elevation or depression, no ischemic or infarct changes, normal axis, no acute change from February 21, 2018    XR chest portable   Final Result      No acute cardiopulmonary disease  Findings are stable            Workstation performed: [id number]         XR shoulder 2+ views LEFT   ED Interpretation   Left shoulder x-ray interpreted me shows no fracture, no dislocation, no acute pathology        Labs Reviewed   BASIC METABOLIC PANEL - Abnormal       Result Value Ref Range Status    Sodium 136  136 - 145 mmol/L Final    Potassium 4 0  3 5 - 5 3 mmol/L Final    Comment: Slightly Hemolyzed; Results May be Affected    Chloride 106  100 - 108 mmol/L Final    CO2 26  21 - 32 mmol/L Final    ANION GAP 4  4 - 13 mmol/L Final    BUN 5  5 - 25 mg/dL Final    Creatinine 0 56 (*) 0 60 - 1 30 mg/dL Final    Comment: Standardized to IDMS reference method    Glucose 118  65 - 140 mg/dL Final    Comment: If the patient is fasting, the ADA then defines impaired fasting glucose as > 100 mg/dL and diabetes as > or equal to 123 mg/dL  Specimen collection should occur prior to Sulfasalazine administration due to the potential for falsely depressed results  Specimen collection should occur prior to Sulfapyridine administration due to the potential for falsely elevated results      Calcium 9 3  8 3 - 10 1 mg/dL Final    eGFR 111  ml/min/1 73sq m Final    Narrative:     [place] guidelines for Chronic Kidney Disease (CKD):     Stage 1 with normal or high GFR (GFR > 90 mL/min/1 73 square meters)    Stage 2 Mild CKD (GFR = 60-89 mL/min/1 73 square meters)    Stage 3A Moderate CKD (GFR = 45-59 mL/min/1 73 square meters)    Stage 3B Moderate CKD (GFR = 30-44 mL/min/1 73 square meters)    Stage 4 Severe CKD (GFR = 15-29 mL/min/1 73 square meters)    Stage 5 End Stage CKD (GFR <15 mL/min/1 73 square meters)  Note: GFR calculation is accurate only with a steady state creatinine   TROPONIN I - Normal    Troponin I <0 02  <=0 04 ng/mL Final    Comment: Siemens Chemistry analyzer 99% cutoff is > 0 04 ng/mL in network labs     o cTnI 99% cutoff is useful only when applied to patients in the clinical setting of myocardial ischemia   o cTnI 99% cutoff should be interpreted in the context of clinical history, ECG findings and possibly cardiac imaging to establish correct diagnosis  o cTnI 99% cutoff may be suggestive but clearly not indicative of a coronary event without the clinical setting of myocardial ischemia  CBC AND DIFFERENTIAL    WBC 6 43  4 31 - 10 16 Thousand/uL Final    RBC 4 70  3 81 - 5 12 Million/uL Final    Hemoglobin 14 0  11 5 - 15 4 g/dL Final    Hematocrit 39 3  34 8 - 46 1 % Final    MCV 84  82 - 98 fL Final    MCH 29 8  26 8 - 34 3 pg Final    MCHC 35 6  31 4 - 37 4 g/dL Final    RDW 12 3  11 6 - 15 1 % Final    MPV 10 7  8 9 - 12 7 fL Final    Platelets 698  232 - 390 Thousands/uL Final    nRBC 0  /100 WBCs Final    Neutrophils Relative 53  43 - 75 % Final    Immat GRANS % 0  0 - 2 % Final    Lymphocytes Relative 33  14 - 44 % Final    Monocytes Relative 10  4 - 12 % Final    Eosinophils Relative 3  0 - 6 % Final    Basophils Relative 1  0 - 1 % Final    Neutrophils Absolute 3 44  1 85 - 7 62 Thousands/µL Final    Immature Grans Absolute 0 02  0 00 - 0 20 Thousand/uL Final    Lymphocytes Absolute 2 09  0 60 - 4 47 Thousands/µL Final    Monocytes Absolute 0 62  0 17 - 1 22 Thousand/µL Final    Eosinophils Absolute 0 21  0 00 - 0 61 Thousand/µL Final    Basophils Absolute 0 05  0 00 - 0 10 Thousands/µL Final       Suspect patient's symptoms are muscular, do not believe this represents acute coronary syndrome or PE or dissection  Supportive care, importance of follow-up and return precautions were discussed with the patient, who expressed understanding        Critical Care Time  Procedures

## 2021-09-08 NOTE — DISCHARGE INSTRUCTIONS
Take Robaxin 500 mg q 6 p r n  For muscle spasm  Do not drive after taking this medication  Return for new or worsening chest pain, worsening shortness of breath, dizziness, or any other concerns  Follow-up with PCP within 2 weeks, physical therapy, pain management

## 2021-09-10 ENCOUNTER — VBI (OUTPATIENT)
Dept: FAMILY MEDICINE CLINIC | Facility: CLINIC | Age: 46
End: 2021-09-10

## 2021-09-10 NOTE — TELEPHONE ENCOUNTER
Gwen Varma    ED Visit Information     Ed visit date: 9/8/21  Diagnosis Description: Muscle spasm +1  In Network? Yes [facility]  Discharge status: Home  Discharged with meds ? Yes  Number of ED visits to date: 1  ED Severity:3     Outreach Information    Outreach successful: Yes 2  Date letter mailed:0  Date Finalized:9/13/21    Care Coordination    Follow up appointment with pcp: no [employer] sent  Transportation issues ? No    Value [employer] type: 3 Day Outreach  Emergent necessity warranted by diagnosis: No  [facility] PCP: Yes  Transportation: Self Transport  Called PCP first?: No  Feels able to call PCP for urgent problems ?: No  Understands what emergencies can be handled by PCP ?: Yes  Ever any problems getting appointment with PCP for minor emergency/urgency problems?: No  Practice Contacted Patient ?: No  Pt had ED follow up with pcp/staff ?: No    Urgent care Education?: No  09/10/2021 11:41 AM Phone (VBI) Nir Sky (Self) [phone] (H)   Left Message - Outreach performed ED f/u on behalf of the [facility] ED visit -There was a Personal communication with patient regarding recent ED visit on 9/8/21  Patient stated that she is doing better, Patient would a follow up with a Phyiscian only ! Not a CRNP  Patient is aware of PCP after hour's on-call service, Patient is aware of their nearest [place] [address] urgent care facility, education not given  Patient does not meet OPCM criteria  Pt would like a follow up with a Doctor only,  her pcp she believes is not in, that is why she went to ER, she did not want to be seen by CRNP, but will consider a follow up with Tatiana Baugh MD  I will send a message on her behalf for a follow up and her requests

## 2021-09-13 ENCOUNTER — TELEPHONE (OUTPATIENT)
Dept: FAMILY MEDICINE CLINIC | Facility: CLINIC | Age: 46
End: 2021-09-13

## 2021-09-13 NOTE — TELEPHONE ENCOUNTER
Called pt- She states that at this time she does not need a F/U  Pt made aware that Dr Pita Garcia is out of the office till 9/30th  Pt will C/B first week of October to F/U w/ Dr Pita Garcia

## 2021-09-13 NOTE — TELEPHONE ENCOUNTER
----- Message from Rhonda Roldan sent at 9/13/2021  2:17 PM EDT -----  Regarding: ER follow up  ED visit date 9/8/21  Dx:Muscle spasm; Myofascial muscle pain      Pt would like a follow up with a Doctor only,  her pcp she believes is not in, that is why she went to ER, she did not want to be seen by Danny Chen, but will consider a follow up with Shiv Hathaway MD   Patient has questions about her PCP  If she is returning? If not she would like to see Wade Olmedo, and change her to her to the PCP      Thank you,  Gina PALOMINO

## 2021-10-21 ENCOUNTER — VBI (OUTPATIENT)
Dept: ADMINISTRATIVE | Facility: OTHER | Age: 46
End: 2021-10-21

## 2021-10-25 ENCOUNTER — CONSULT (OUTPATIENT)
Dept: PAIN MEDICINE | Facility: CLINIC | Age: 46
End: 2021-10-25
Payer: COMMERCIAL

## 2021-10-25 VITALS
SYSTOLIC BLOOD PRESSURE: 126 MMHG | DIASTOLIC BLOOD PRESSURE: 84 MMHG | BODY MASS INDEX: 28.89 KG/M2 | WEIGHT: 179 LBS | HEART RATE: 76 BPM

## 2021-10-25 DIAGNOSIS — M48.02 CERVICAL SPINAL STENOSIS: Primary | ICD-10-CM

## 2021-10-25 DIAGNOSIS — M54.12 CERVICAL RADICULOPATHY: ICD-10-CM

## 2021-10-25 PROCEDURE — 99214 OFFICE O/P EST MOD 30 MIN: CPT | Performed by: ANESTHESIOLOGY

## 2021-10-25 PROCEDURE — 1036F TOBACCO NON-USER: CPT | Performed by: ANESTHESIOLOGY

## 2021-10-25 RX ORDER — PREGABALIN 50 MG/1
50 CAPSULE ORAL 2 TIMES DAILY
Qty: 60 CAPSULE | Refills: 1 | Status: SHIPPED | OUTPATIENT
Start: 2021-10-25 | End: 2021-12-07

## 2021-11-12 ENCOUNTER — TELEPHONE (OUTPATIENT)
Dept: PAIN MEDICINE | Facility: CLINIC | Age: 46
End: 2021-11-12

## 2021-11-12 ENCOUNTER — HOSPITAL ENCOUNTER (OUTPATIENT)
Dept: RADIOLOGY | Facility: HOSPITAL | Age: 46
Discharge: HOME | End: 2021-11-12
Attending: ANESTHESIOLOGY
Payer: COMMERCIAL

## 2021-11-12 DIAGNOSIS — M54.12 CERVICAL RADICULOPATHY: ICD-10-CM

## 2021-11-12 DIAGNOSIS — M48.02 CERVICAL SPINAL STENOSIS: ICD-10-CM

## 2021-11-12 DIAGNOSIS — M48.02 CERVICAL SPINAL STENOSIS: Primary | ICD-10-CM

## 2021-11-12 PROCEDURE — 72141 MRI NECK SPINE W/O DYE: CPT

## 2021-11-16 ENCOUNTER — APPOINTMENT (OUTPATIENT)
Dept: LAB | Facility: CLINIC | Age: 46
End: 2021-11-16
Payer: COMMERCIAL

## 2021-11-16 DIAGNOSIS — E53.8 VITAMIN B12 DEFICIENCY: ICD-10-CM

## 2021-11-16 DIAGNOSIS — E55.9 VITAMIN D DEFICIENCY: ICD-10-CM

## 2021-11-16 DIAGNOSIS — E78.1 HYPERTRIGLYCERIDEMIA: ICD-10-CM

## 2021-11-16 DIAGNOSIS — E11.9 TYPE 2 DIABETES MELLITUS WITHOUT COMPLICATION, WITHOUT LONG-TERM CURRENT USE OF INSULIN (HCC): ICD-10-CM

## 2021-11-16 LAB
25(OH)D3 SERPL-MCNC: 14.1 NG/ML (ref 30–100)
CHOLEST SERPL-MCNC: 215 MG/DL (ref 50–200)
EST. AVERAGE GLUCOSE BLD GHB EST-MCNC: 137 MG/DL
HBA1C MFR BLD: 6.4 %
HDLC SERPL-MCNC: 49 MG/DL
LDLC SERPL CALC-MCNC: 116 MG/DL (ref 0–100)
TRIGL SERPL-MCNC: 250 MG/DL
VIT B12 SERPL-MCNC: 345 PG/ML (ref 100–900)

## 2021-11-16 PROCEDURE — 82306 VITAMIN D 25 HYDROXY: CPT

## 2021-11-16 PROCEDURE — 36415 COLL VENOUS BLD VENIPUNCTURE: CPT

## 2021-11-16 PROCEDURE — 83036 HEMOGLOBIN GLYCOSYLATED A1C: CPT

## 2021-11-16 PROCEDURE — 3044F HG A1C LEVEL LT 7.0%: CPT | Performed by: PHYSICIAN ASSISTANT

## 2021-11-16 PROCEDURE — 80061 LIPID PANEL: CPT

## 2021-11-16 PROCEDURE — 82607 VITAMIN B-12: CPT

## 2021-11-19 ENCOUNTER — OFFICE VISIT (OUTPATIENT)
Dept: GASTROENTEROLOGY | Facility: CLINIC | Age: 46
End: 2021-11-19
Payer: COMMERCIAL

## 2021-11-19 VITALS
DIASTOLIC BLOOD PRESSURE: 78 MMHG | HEIGHT: 66 IN | BODY MASS INDEX: 28.61 KG/M2 | HEART RATE: 77 BPM | TEMPERATURE: 98.1 F | SYSTOLIC BLOOD PRESSURE: 120 MMHG | WEIGHT: 178 LBS

## 2021-11-19 DIAGNOSIS — R10.12 LUQ PAIN: Primary | ICD-10-CM

## 2021-11-19 DIAGNOSIS — K76.0 NAFL (NONALCOHOLIC FATTY LIVER): ICD-10-CM

## 2021-11-19 DIAGNOSIS — R14.0 ABDOMINAL BLOATING: ICD-10-CM

## 2021-11-19 DIAGNOSIS — Z12.11 COLON CANCER SCREENING: ICD-10-CM

## 2021-11-19 PROCEDURE — 3008F BODY MASS INDEX DOCD: CPT | Performed by: PHYSICIAN ASSISTANT

## 2021-11-19 PROCEDURE — 99204 OFFICE O/P NEW MOD 45 MIN: CPT | Performed by: PHYSICIAN ASSISTANT

## 2021-11-19 PROCEDURE — 1036F TOBACCO NON-USER: CPT | Performed by: PHYSICIAN ASSISTANT

## 2021-11-19 RX ORDER — SODIUM PICOSULFATE, MAGNESIUM OXIDE, AND ANHYDROUS CITRIC ACID 10; 3.5; 12 MG/160ML; G/160ML; G/160ML
LIQUID ORAL
Qty: 320 ML | Refills: 0 | Status: SHIPPED | OUTPATIENT
Start: 2021-11-19

## 2021-12-07 ENCOUNTER — CONSULT (OUTPATIENT)
Dept: NEUROSURGERY | Facility: CLINIC | Age: 46
End: 2021-12-07
Payer: COMMERCIAL

## 2021-12-07 ENCOUNTER — OFFICE VISIT (OUTPATIENT)
Dept: FAMILY MEDICINE CLINIC | Facility: CLINIC | Age: 46
End: 2021-12-07
Payer: COMMERCIAL

## 2021-12-07 VITALS
WEIGHT: 177 LBS | DIASTOLIC BLOOD PRESSURE: 60 MMHG | HEIGHT: 66 IN | SYSTOLIC BLOOD PRESSURE: 110 MMHG | RESPIRATION RATE: 18 BRPM | BODY MASS INDEX: 28.45 KG/M2 | OXYGEN SATURATION: 98 % | TEMPERATURE: 97.8 F | HEART RATE: 83 BPM

## 2021-12-07 VITALS
WEIGHT: 177 LBS | SYSTOLIC BLOOD PRESSURE: 102 MMHG | BODY MASS INDEX: 28.45 KG/M2 | HEART RATE: 72 BPM | HEIGHT: 66 IN | DIASTOLIC BLOOD PRESSURE: 72 MMHG | TEMPERATURE: 97.8 F

## 2021-12-07 DIAGNOSIS — M54.12 CERVICAL RADICULOPATHY: Primary | ICD-10-CM

## 2021-12-07 DIAGNOSIS — M54.12 CERVICAL RADICULOPATHY: ICD-10-CM

## 2021-12-07 DIAGNOSIS — M50.30 DDD (DEGENERATIVE DISC DISEASE), CERVICAL: Primary | ICD-10-CM

## 2021-12-07 DIAGNOSIS — E55.9 VITAMIN D DEFICIENCY: ICD-10-CM

## 2021-12-07 DIAGNOSIS — L29.8 NASAL ITCHING: ICD-10-CM

## 2021-12-07 DIAGNOSIS — E11.9 TYPE 2 DIABETES MELLITUS WITHOUT COMPLICATION, WITHOUT LONG-TERM CURRENT USE OF INSULIN (HCC): ICD-10-CM

## 2021-12-07 DIAGNOSIS — M47.812 SPONDYLOSIS OF CERVICAL SPINE WITHOUT MYELOPATHY: ICD-10-CM

## 2021-12-07 DIAGNOSIS — M48.02 CERVICAL SPINAL STENOSIS: ICD-10-CM

## 2021-12-07 DIAGNOSIS — F41.1 GENERALIZED ANXIETY DISORDER: ICD-10-CM

## 2021-12-07 PROBLEM — G47.00 INSOMNIA: Status: RESOLVED | Noted: 2018-02-23 | Resolved: 2021-12-07

## 2021-12-07 PROBLEM — E53.8 VITAMIN B12 DEFICIENCY: Status: RESOLVED | Noted: 2018-07-18 | Resolved: 2021-12-07

## 2021-12-07 PROBLEM — M25.559 HIP PAIN: Status: RESOLVED | Noted: 2020-06-29 | Resolved: 2021-12-07

## 2021-12-07 PROBLEM — M79.18 MYOFASCIAL PAIN: Status: RESOLVED | Noted: 2019-05-06 | Resolved: 2021-12-07

## 2021-12-07 PROBLEM — M25.552 GREATER TROCHANTERIC PAIN SYNDROME OF LEFT LOWER EXTREMITY: Status: RESOLVED | Noted: 2020-08-26 | Resolved: 2021-12-07

## 2021-12-07 PROBLEM — L29.89 NASAL ITCHING: Status: ACTIVE | Noted: 2021-12-07

## 2021-12-07 PROBLEM — R93.89 ABNORMAL CHEST X-RAY: Status: RESOLVED | Noted: 2020-11-15 | Resolved: 2021-12-07

## 2021-12-07 PROCEDURE — 1036F TOBACCO NON-USER: CPT | Performed by: FAMILY MEDICINE

## 2021-12-07 PROCEDURE — 3008F BODY MASS INDEX DOCD: CPT | Performed by: NEUROLOGICAL SURGERY

## 2021-12-07 PROCEDURE — 3725F SCREEN DEPRESSION PERFORMED: CPT | Performed by: FAMILY MEDICINE

## 2021-12-07 PROCEDURE — 99214 OFFICE O/P EST MOD 30 MIN: CPT | Performed by: FAMILY MEDICINE

## 2021-12-07 PROCEDURE — 99204 OFFICE O/P NEW MOD 45 MIN: CPT | Performed by: NEUROLOGICAL SURGERY

## 2021-12-07 RX ORDER — FLUTICASONE PROPIONATE 50 MCG
1 SPRAY, SUSPENSION (ML) NASAL DAILY
Qty: 16 G | Refills: 5 | Status: SHIPPED | OUTPATIENT
Start: 2021-12-07

## 2021-12-07 RX ORDER — ERGOCALCIFEROL 1.25 MG/1
50000 CAPSULE ORAL WEEKLY
Qty: 6 CAPSULE | Refills: 0 | Status: SHIPPED | OUTPATIENT
Start: 2021-12-07

## 2022-01-21 ENCOUNTER — VBI (OUTPATIENT)
Dept: ADMINISTRATIVE | Facility: OTHER | Age: 47
End: 2022-01-21

## 2022-02-01 ENCOUNTER — TELEPHONE (OUTPATIENT)
Dept: GASTROENTEROLOGY | Facility: HOSPITAL | Age: 47
End: 2022-02-01

## 2022-02-02 ENCOUNTER — ANESTHESIA EVENT (OUTPATIENT)
Dept: GASTROENTEROLOGY | Facility: HOSPITAL | Age: 47
End: 2022-02-02

## 2022-02-02 ENCOUNTER — HOSPITAL ENCOUNTER (OUTPATIENT)
Dept: GASTROENTEROLOGY | Facility: HOSPITAL | Age: 47
Setting detail: OUTPATIENT SURGERY
Discharge: HOME | End: 2022-02-02
Attending: INTERNAL MEDICINE | Admitting: INTERNAL MEDICINE
Payer: COMMERCIAL

## 2022-02-02 ENCOUNTER — ANESTHESIA (OUTPATIENT)
Dept: GASTROENTEROLOGY | Facility: HOSPITAL | Age: 47
End: 2022-02-02

## 2022-02-02 VITALS
SYSTOLIC BLOOD PRESSURE: 99 MMHG | DIASTOLIC BLOOD PRESSURE: 63 MMHG | HEART RATE: 65 BPM | OXYGEN SATURATION: 100 % | WEIGHT: 177 LBS | TEMPERATURE: 96.2 F | RESPIRATION RATE: 18 BRPM | HEIGHT: 66 IN | BODY MASS INDEX: 28.45 KG/M2

## 2022-02-02 DIAGNOSIS — Z12.11 COLON CANCER SCREENING: ICD-10-CM

## 2022-02-02 DIAGNOSIS — R10.12 LUQ PAIN: ICD-10-CM

## 2022-02-02 PROCEDURE — 88305 TISSUE EXAM BY PATHOLOGIST: CPT | Performed by: PATHOLOGY

## 2022-02-02 PROCEDURE — 43239 EGD BIOPSY SINGLE/MULTIPLE: CPT | Performed by: INTERNAL MEDICINE

## 2022-02-02 PROCEDURE — 45385 COLONOSCOPY W/LESION REMOVAL: CPT | Performed by: INTERNAL MEDICINE

## 2022-02-02 RX ORDER — FENTANYL CITRATE 50 UG/ML
INJECTION, SOLUTION INTRAMUSCULAR; INTRAVENOUS AS NEEDED
Status: DISCONTINUED | OUTPATIENT
Start: 2022-02-02 | End: 2022-02-02

## 2022-02-02 RX ORDER — SODIUM CHLORIDE, SODIUM LACTATE, POTASSIUM CHLORIDE, CALCIUM CHLORIDE 600; 310; 30; 20 MG/100ML; MG/100ML; MG/100ML; MG/100ML
INJECTION, SOLUTION INTRAVENOUS CONTINUOUS PRN
Status: DISCONTINUED | OUTPATIENT
Start: 2022-02-02 | End: 2022-02-02

## 2022-02-02 RX ORDER — PROPOFOL 10 MG/ML
INJECTION, EMULSION INTRAVENOUS AS NEEDED
Status: DISCONTINUED | OUTPATIENT
Start: 2022-02-02 | End: 2022-02-02

## 2022-02-02 RX ADMIN — SODIUM CHLORIDE, SODIUM LACTATE, POTASSIUM CHLORIDE, AND CALCIUM CHLORIDE: .6; .31; .03; .02 INJECTION, SOLUTION INTRAVENOUS at 10:03

## 2022-02-02 RX ADMIN — PROPOFOL 50 MG: 10 INJECTION, EMULSION INTRAVENOUS at 10:26

## 2022-02-02 RX ADMIN — PROPOFOL 50 MG: 10 INJECTION, EMULSION INTRAVENOUS at 10:21

## 2022-02-02 RX ADMIN — PROPOFOL 100 MG: 10 INJECTION, EMULSION INTRAVENOUS at 10:07

## 2022-02-02 RX ADMIN — FENTANYL CITRATE 50 MCG: 50 INJECTION INTRAMUSCULAR; INTRAVENOUS at 10:07

## 2022-02-02 RX ADMIN — PROPOFOL 100 MG: 10 INJECTION, EMULSION INTRAVENOUS at 10:17

## 2022-02-02 NOTE — H&P
History and Physical -  [facility]  Dasha Gilman 49 y o  female MRN: [id number]                  HPI: Dasha Gilman is a 49y o  year old female who presents for EGD/colonoscopy for abdominal pain and colon cancer screening  REVIEW OF SYSTEMS: Per the HPI, and otherwise unremarkable  Historical Information   Past Medical History:   Diagnosis Date    Cervical radiculopathy 2019    COVID-19 virus infection 11/15/2020    DDD (degenerative disc disease), cervical 10/7/2019    Generalized anxiety disorder 2018    Hypertriglyceridemia 2018    Migraine without aura and without status migrainosus, not intractable 10/3/2019    Type 2 diabetes mellitus without complication, without long-term current use of insulin ([facility] [address] ) 2021     Past Surgical History:   Procedure Laterality Date     SECTION      CHOLECYSTECTOMY      TOTAL ABDOMINAL HYSTERECTOMY      Due to fibroids   Ovaries present     Social History   Social History     Substance and Sexual Activity   Alcohol Use No     Social History     Substance and Sexual Activity   Drug Use No     Social History     Tobacco Use   Smoking Status Never Smoker   Smokeless Tobacco Never Used     Family History   Problem Relation Age of Onset    Diabetes Mother     Hypertension Mother     Heart attack Mother     Stroke Father     No Known Problems Daughter     No Known Problems Maternal Grandmother     No Known Problems Maternal Grandfather     No Known Problems Paternal Grandmother     No Known Problems Paternal Grandfather     No Known Problems Maternal Aunt     No Known Problems Maternal Aunt     No Known Problems Maternal Aunt     No Known Problems Paternal Aunt     No Known Problems Paternal Aunt     No Known Problems Paternal Aunt     No Known Problems Paternal Aunt     No Known Problems Paternal Aunt        Meds/Allergies       Current Outpatient Medications:     acetaminophen (TYLENOL) 500 mg tablet    clotrimazole-betamethasone (LOTRISONE) 1-0 05 % cream    cyanocobalamin (VITAMIN B-12) 1,000 mcg tablet    ergocalciferol (VITAMIN D2) 50,000 units    fluticasone (FLONASE) 50 mcg/act nasal spray    FREESTYLE LITE test strip    metFORMIN (GLUCOPHAGE) 500 mg tablet    Sod Picosulfate-Mag Ox-Cit Acd (Clenpiq) 10-3 5-12 MG-GM -GM/160ML SOLN    Allergies   Allergen Reactions    Bee Pollen Itching     Itchy, runny nose    Dust Mite Extract Itching     Itchy, runny nose    Pollen Extract Itching     Itchy, runny nose       Objective     There were no vitals taken for this visit  PHYSICAL EXAM    Gen: NAD  Head: NCAT  CV: RRR  CHEST: Clear  ABD: soft, NT/ND  EXT: no edema      ASSESSMENT/PLAN:   Justine Cartagena is a 49y o  year old female who presents for EGD/colonoscopy for abdominal pain and colon cancer screening  The patient is stable and optimized for the procedure, we reviewed risk and benefits  Risk include but not limited to infection, bleeding, perforation and missing a lesion

## 2022-02-02 NOTE — ANESTHESIA PREPROCEDURE EVALUATION
Procedure:  COLONOSCOPY  EGD    Relevant Problems   CARDIO   (+) Hypertriglyceridemia   (+) Migraine without aura and without status migrainosus, not intractable      ENDO   (+) Type 2 diabetes mellitus without complication, without long-term current use of insulin (HCC)      MUSCULOSKELETAL   (+) DDD (degenerative disc disease), cervical   (+) Spondylosis of cervical spine without myelopathy      NEURO/PSYCH   (+) Generalized anxiety disorder   (+) Migraine without aura and without status migrainosus, not intractable      Other   (+) Cervical radiculopathy   (+) Chronic pain of left knee   (+) Vitamin D deficiency      Lab Results   Component Value Date    SODIUM 136 09/08/2021    K 4 0 09/08/2021     09/08/2021    CO2 26 09/08/2021    AGAP 4 09/08/2021    BUN 5 09/08/2021    CREATININE 0 56 (L) 09/08/2021    GLUC 118 09/08/2021    GLUF 98 01/21/2021    CALCIUM 9 3 09/08/2021    AST 12 01/21/2021    ALT 31 01/21/2021    ALKPHOS 64 01/21/2021    TP 7 2 01/21/2021    TBILI 0 49 01/21/2021    EGFR 111 09/08/2021         Lab Results   Component Value Date    WBC 6 43 09/08/2021    HGB 14 0 09/08/2021    HCT 39 3 09/08/2021    MCV 84 09/08/2021     09/08/2021       Physical Exam    Airway    Mallampati score: II  TM Distance: >3 FB  Neck ROM: full     Dental   No notable dental hx     Cardiovascular      Pulmonary      Other Findings        Anesthesia Plan  ASA Score- 2     Anesthesia Type- IV sedation with anesthesia with ASA Monitors  Additional Monitors:   Airway Plan:           Plan Factors-Exercise tolerance (METS): >4 METS  Chart reviewed  EKG reviewed  Imaging results reviewed  Existing labs reviewed  Patient summary reviewed  Patient is not a current smoker  Patient did not smoke on day of surgery  Induction- intravenous  Postoperative Plan-     Informed Consent- Anesthetic plan and risks discussed with patient  I personally reviewed this patient with the CRNA   Discussed and agreed on the Anesthesia Plan with the CRNA  Brianna Moon

## 2022-02-07 ENCOUNTER — NURSE TRIAGE (OUTPATIENT)
Dept: OTHER | Facility: OTHER | Age: 47
End: 2022-02-07

## 2022-02-07 DIAGNOSIS — K62.5 RECTAL BLEEDING: Primary | ICD-10-CM

## 2022-02-07 NOTE — TELEPHONE ENCOUNTER
OV 11/19/21 Charity Cowan  Hx: LUQ pain, bloating, fatty liver  Colonoscopy/ EGD: 2/2/22    Pt had scopes done last Wednesday  Reports feeling fine after scopes, BM thursday and Friday  However over weekend had 2 days of constipation and abdominal "soreness"  Pt took stool softeners yesterday and this AM had two formed stools with blood mixed in and blood with wiping  Denies N/V/D, dizziness, SOB, pain, or any other appreciating symptoms  Recs:  1  Advised to go to ER should symptoms worsen or develops dizziness, pain, increased BRBPR, melena, or SOB  2  Please advise, could be hemorrhoidal however none documented on colonoscopy   Advised she may have to go to ER since she has no hx of BRBPR

## 2022-02-07 NOTE — TELEPHONE ENCOUNTER
Reason for Disposition   MILD rectal bleeding (more than just a few drops or streaks)    Answer Assessment - Initial Assessment Questions  1  APPEARANCE of BLOOD: "What color is it?" "Is it passed separately, on the surface of the stool, or mixed in with the stool?"       Soft/formed blood mixed in toilet  2  AMOUNT: "How much blood was passed?"       moderate  3  FREQUENCY: "How many times has blood been passed with the stools?"       Twice with bowel movement this am  4  ONSET: "When was the blood first seen in the stools?" (Days or weeks)       2/7    6  CONSTIPATION: "Do you have constipation?" If Yes, ask: "How bad is it?"      Was constipated yesterday and took stool softeners     8  BLOOD THINNERS: "Do you take any blood thinners?" (e g , Coumadin/warfarin, Pradaxa/dabigatran, aspirin)      no  9   OTHER SYMPTOMS: "Do you have any other symptoms?"  (e g , abdominal pain, vomiting, dizziness, fever)   no    Protocols used: RECTAL BLEEDING-ADULT-AH

## 2022-02-07 NOTE — TELEPHONE ENCOUNTER
The bleeding is likely due to some anorectal irritation, could also be delayed post-polypectomy bleeding  Hopefully her bleeding will be self-limited  She should continue to monitor and use stool softeners PRN to keep stools soft, stay hydrated  If bleeding persists, she should either call our office to go to the ED as you advised

## 2022-02-07 NOTE — TELEPHONE ENCOUNTER
Regarding: Has blood in bowels   ----- Message from Jose Angel Neal sent at 2/7/2022 10:06 AM EST -----  "After taking stool softeners yesterday because I was having issues having a bowel movement I started to have blood in my bowels "

## 2022-02-08 ENCOUNTER — OFFICE VISIT (OUTPATIENT)
Dept: FAMILY MEDICINE CLINIC | Facility: CLINIC | Age: 47
End: 2022-02-08
Payer: COMMERCIAL

## 2022-02-08 VITALS
RESPIRATION RATE: 16 BRPM | BODY MASS INDEX: 27.97 KG/M2 | HEART RATE: 78 BPM | OXYGEN SATURATION: 98 % | TEMPERATURE: 98 F | DIASTOLIC BLOOD PRESSURE: 60 MMHG | WEIGHT: 174 LBS | HEIGHT: 66 IN | SYSTOLIC BLOOD PRESSURE: 90 MMHG

## 2022-02-08 DIAGNOSIS — K64.9 HEMORRHOIDS, UNSPECIFIED HEMORRHOID TYPE: ICD-10-CM

## 2022-02-08 DIAGNOSIS — K62.5 BRIGHT RED RECTAL BLEEDING: Primary | ICD-10-CM

## 2022-02-08 DIAGNOSIS — E11.9 TYPE 2 DIABETES MELLITUS WITHOUT COMPLICATION, WITHOUT LONG-TERM CURRENT USE OF INSULIN (HCC): ICD-10-CM

## 2022-02-08 LAB — SL AMB POCT FECES OCC BLD: NEGATIVE

## 2022-02-08 PROCEDURE — 82270 OCCULT BLOOD FECES: CPT | Performed by: NURSE PRACTITIONER

## 2022-02-08 PROCEDURE — 3008F BODY MASS INDEX DOCD: CPT | Performed by: NURSE PRACTITIONER

## 2022-02-08 PROCEDURE — 1036F TOBACCO NON-USER: CPT | Performed by: NURSE PRACTITIONER

## 2022-02-08 PROCEDURE — 99213 OFFICE O/P EST LOW 20 MIN: CPT | Performed by: NURSE PRACTITIONER

## 2022-02-08 RX ORDER — HYDROCORTISONE 25 MG/G
CREAM TOPICAL 2 TIMES DAILY
Qty: 28 G | Refills: 0 | Status: SHIPPED | OUTPATIENT
Start: 2022-02-08

## 2022-02-08 NOTE — RESULT ENCOUNTER NOTE
I called her with her negative results  Repeat colonoscopy in 10 years  She complains of rectal bleeding when she strains to pass hard stools  This sounds like hemorrhoid bleeding but I will check her CBC to confirm she isn't anemic  I also suggested she increase her fiber and take a stool softener to avoid straining  She also complains of fatigues and wonders if it is due to her COVID infection last month   I asked her to discuss with her PCP

## 2022-02-08 NOTE — PROGRESS NOTES
FAMILY PRACTICE OFFICE VISIT       NAME: Kassi Back  AGE: 49 y o  SEX: female       : 1975        MRN: [id number]    Assessment and Plan   1  Bright red rectal bleeding  -     POCT hemoccult screening    2  Hemorrhoids, unspecified hemorrhoid type  -     hydrocortisone (ANUSOL-HC) 2 5 % rectal cream; Apply topically 2 (two) times a day    3  Type 2 diabetes mellitus without complication, without long-term current use of insulin (HCC)       Rectal bleeding attributed to 1 external hemorrhoid noted on exam   Start Anusol 2 5% rectal cream   Discussed importance of avoiding constipation, push fluids, regular exercise, slowly increase fiber in the diet, may continue to use psyllium husk  May continue to use stool softener for next few days, and as needed  Suspect bloating, abdominal discomfort is secondary to constipation  This should resolve as bowels start moving again regularly  Call for any persisting symptoms  Last hemoglobin A1c 6 4%  Please follow-up with Dr Seema Saleh in April for diabetes management  Chief Complaint     Chief Complaint   Patient presents with    Rectal Bleeding    Abdominal Pain       History of Present Illness     Kassi Back is a 49year old female presenting today for rectal bleeding  Colonoscopy completed 22, with only one sessile polyp  Recall 5 years  Constipated for 3 days  Took stool softener  Had bowel movement yesterday  Stool was hard and difficult to pass  Saw blood in the toilet twice yesterday associated with bowel movement  Abdomen is bloated  Generalized abdominal discomfort--feels sore  Eating normal foods since colonoscopy  Did not eat lightly after colonoscopy  Decreased appetite yesterday  [place] weak yesterday  Knees sore yesterday  Took Tylenol  Today, feeling a little weak  Better than yesterday  Has CBC order from Dr Kiel Joy due to rectal bleeding  Planning to complete today      Still feels sore today in abdomen, but better than yesterday  Feels she did not drink enough fluids surrounding colonoscopy, which may have caused constipation  No bowel movement since yesterday  No blood since yesterday  No nausea or vomiting  Constipation at times, if doesn't drink enough  Metformin helps her to go  Also uses Psylium Husk  Review of Systems   Review of Systems   Constitutional: Negative  Gastrointestinal: Positive for abdominal distention (bloating), abdominal pain (generalized "soreness"), anal bleeding, constipation and rectal pain (with bowel movement yesterday, still little sore today  )  Negative for blood in stool, diarrhea, nausea and vomiting  Neurological: Positive for weakness (mild generalized)  I have reviewed the patient's medical history in detail; there are no changes to the history as noted in the electronic medical record  Objective     Vitals:    02/08/22 0943   BP: 90/60   Pulse: 78   Resp: 16   Temp: 98 °F (36 7 °C)   TempSrc: Temporal   SpO2: 98%   Weight: 78 9 kg (174 lb)   Height: 5' 6" (1 676 m)     Wt Readings from Last 3 Encounters:   02/08/22 78 9 kg (174 lb)   02/02/22 80 3 kg (177 lb)   12/07/21 80 3 kg (177 lb)     Physical Exam  Vitals and nursing note reviewed  Constitutional:       General: She is not in acute distress  Appearance: She is well-developed  She is not ill-appearing  Cardiovascular:      Rate and Rhythm: Normal rate and regular rhythm  Heart sounds: No murmur heard  Pulmonary:      Effort: Pulmonary effort is normal  No respiratory distress  Breath sounds: Normal breath sounds  No wheezing or rales  Abdominal:      General: Abdomen is flat  Bowel sounds are normal       Palpations: Abdomen is soft  Tenderness: There is abdominal tenderness (generalized mild tenderness)  Genitourinary:     Rectum: Guaiac result negative   Tenderness and external hemorrhoid (1 moderate sized external hemorrhoid at 9 o'clock position  ) present  No anal fissure  Normal anal tone  Neurological:      Mental Status: She is alert  Psychiatric:         Mood and Affect: Mood normal             ALLERGIES:  Allergies   Allergen Reactions    Bee Pollen Itching     Itchy, runny nose    Dust Mite Extract Itching     Itchy, runny nose    Pollen Extract Itching     Itchy, runny nose       Current Medications     Current Outpatient Medications   Medication Sig Dispense Refill    acetaminophen (TYLENOL) 500 mg tablet Take 500 mg by mouth as needed        clotrimazole-betamethasone (LOTRISONE) 1-0 05 % cream Apply topically 2 (two) times a day 45 g 3    cyanocobalamin (VITAMIN B-12) 1,000 mcg tablet Take 2,000 mcg by mouth daily      ergocalciferol (VITAMIN D2) 50,000 units Take 1 capsule (50,000 Units total) by mouth once a week 6 capsule 0    fluticasone (FLONASE) 50 mcg/act nasal spray 1 spray into each nostril daily 16 g 5    FREESTYLE LITE test strip USE TO TEST BLOOD SUGAR AS DIRECTED 100 each 5    metFORMIN (GLUCOPHAGE) 500 mg tablet Take 1 tablet (500 mg total) by mouth daily with breakfast 90 tablet 3    hydrocortisone (ANUSOL-HC) 2 5 % rectal cream Apply topically 2 (two) times a day 28 g 0    Sod Picosulfate-Mag Ox-Cit Acd (Clenpiq) 10-3 5-12 MG-GM -GM/160ML SOLN Use as directed per office instructions (Patient not taking: Reported on 2/8/2022 ) 320 mL 0     No current facility-administered medications for this visit           Health Maintenance     Health Maintenance   Topic Date Due    Hepatitis C Screening  Never done    DM Eye Exam  Never done    HIV Screening  Never done    COVID-19 Vaccine (3 - Booster for Moderna series) 10/21/2021    Annual Physical  05/05/2022    BMI: Followup Plan  05/08/2022    Pneumococcal Vaccine: Pediatrics (0 to 5 Years) and At-Risk Patients (6 to 59 Years) (1 of 2 - PPSV23) 12/07/2022 (Originally 1/3/1981)    HEMOGLOBIN A1C  05/16/2022    URINE MICROALBUMIN  08/25/2022    Diabetic Foot Exam  08/26/2022    Depression Screening  12/07/2022    BMI: Adult  02/08/2023    Breast Cancer Screening: Mammogram  07/28/2023    DTaP,Tdap,and Td Vaccines (3 - Td or Tdap) 03/09/2026    Influenza Vaccine  Completed    HIB Vaccine  Aged Out    Hepatitis B Vaccine  Aged Out    IPV Vaccine  Aged Out    Hepatitis A Vaccine  Aged Out    Meningococcal ACWY Vaccine  Aged Out    HPV Vaccine  Aged Out     Immunization History   Administered Date(s) Administered    COVID-19 MODERNA VACC 0 5 ML IM 04/22/2021, 05/21/2021    Fluzone Split Quad 0 5 mL 11/09/2015    INFLUENZA 11/09/2015, 11/01/2018, 11/25/2021    Td (adult), adsorbed 03/09/2016    Tdap 01/01/2016       CLAYTON Garza

## 2022-05-17 ENCOUNTER — VBI (OUTPATIENT)
Dept: ADMINISTRATIVE | Facility: OTHER | Age: 47
End: 2022-05-17

## 2022-06-06 ENCOUNTER — TELEPHONE (OUTPATIENT)
Dept: NEUROSURGERY | Facility: CLINIC | Age: 47
End: 2022-06-06

## 2022-06-30 ENCOUNTER — OFFICE VISIT (OUTPATIENT)
Dept: FAMILY MEDICINE CLINIC | Facility: CLINIC | Age: 47
End: 2022-06-30
Payer: COMMERCIAL

## 2022-06-30 VITALS
SYSTOLIC BLOOD PRESSURE: 120 MMHG | HEART RATE: 83 BPM | OXYGEN SATURATION: 98 % | HEIGHT: 66 IN | BODY MASS INDEX: 28.45 KG/M2 | WEIGHT: 177 LBS | RESPIRATION RATE: 18 BRPM | DIASTOLIC BLOOD PRESSURE: 80 MMHG | TEMPERATURE: 97.8 F

## 2022-06-30 DIAGNOSIS — M47.812 SPONDYLOSIS OF CERVICAL SPINE WITHOUT MYELOPATHY: ICD-10-CM

## 2022-06-30 DIAGNOSIS — G89.29 CHRONIC LOW BACK PAIN WITHOUT SCIATICA, UNSPECIFIED BACK PAIN LATERALITY: ICD-10-CM

## 2022-06-30 DIAGNOSIS — E11.9 TYPE 2 DIABETES MELLITUS WITHOUT COMPLICATION, WITHOUT LONG-TERM CURRENT USE OF INSULIN (HCC): Primary | ICD-10-CM

## 2022-06-30 DIAGNOSIS — M54.50 CHRONIC LOW BACK PAIN WITHOUT SCIATICA, UNSPECIFIED BACK PAIN LATERALITY: ICD-10-CM

## 2022-06-30 DIAGNOSIS — N94.9 GYNECOLOGICAL DISORDER: ICD-10-CM

## 2022-06-30 LAB
CREAT UR-MCNC: 271 MG/DL
MICROALBUMIN UR-MCNC: 12 MG/L (ref 0–20)
MICROALBUMIN/CREAT 24H UR: 4 MG/G CREATININE (ref 0–30)
SL AMB POCT HEMOGLOBIN AIC: 6.6 (ref ?–6.5)

## 2022-06-30 PROCEDURE — 83036 HEMOGLOBIN GLYCOSYLATED A1C: CPT | Performed by: FAMILY MEDICINE

## 2022-06-30 PROCEDURE — 82570 ASSAY OF URINE CREATININE: CPT | Performed by: FAMILY MEDICINE

## 2022-06-30 PROCEDURE — 82043 UR ALBUMIN QUANTITATIVE: CPT | Performed by: FAMILY MEDICINE

## 2022-06-30 PROCEDURE — 99214 OFFICE O/P EST MOD 30 MIN: CPT | Performed by: FAMILY MEDICINE

## 2022-06-30 RX ORDER — CEPHALEXIN 250 MG/1
CAPSULE ORAL
COMMUNITY
Start: 2022-06-23

## 2022-06-30 NOTE — PROGRESS NOTES
Chief Complaint   Patient presents with    Foot Pain     Pt is here for bottom of rt foot pain 2 + wks        HPI   Here for follow-up of diabetes, hypertriglyceridemia, DJD cervical spine, and migraine  States that she is doing well  Continues on her usual medications  Neck pain comes and goes  She does try to exercise it  Also gets some low back pain  Two weeks ago, stepped on the metal end of a tape measure and went into her right heel  She had to pull it out  One week ago, went to [facility] because of the foot pain  Was given 250 mg a cephalexin to use 4 times a day  Having some sexual difficulties went like to speak to a gynecologist   She she did have a complete hysterectomy done in [facility] years ago  Admits that she is not taking metformin every day  She does check her blood sugar and decides whether to take it or not based on that  Past Medical History:   Diagnosis Date    Cervical radiculopathy 2019    Chronic low back pain without sciatica 2022    COVID-19 virus infection 11/15/2020    DDD (degenerative disc disease), cervical 10/7/2019    Generalized anxiety disorder 2018    Hypertriglyceridemia 2018    Migraine without aura and without status migrainosus, not intractable 10/3/2019    Type 2 diabetes mellitus without complication, without long-term current use [facility] 2021        Past Surgical History:   Procedure Laterality Date     SECTION      CHOLECYSTECTOMY      TOTAL ABDOMINAL HYSTERECTOMY      Due to fibroids  Ovaries present       Social History     Tobacco Use    Smoking status: Never Smoker    Smokeless tobacco: Never Used   Substance Use Topics    Alcohol use: No       Social History     Social History Narrative     since   3 children  23,20, 18  All at home   19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   works for the [employer]  Enjoys cooking           The following portions of the patient's history were reviewed and updated as appropriate: allergies, current medications, past family history, past medical history, past social history, past surgical history and problem list       Review of Systems       /80   Pulse 83   Temp 97 8 °F (36 6 °C) (Temporal)   Resp 18   Ht 5' 6" (1 676 m)   Wt 80 3 kg (177 lb)   SpO2 98%   BMI 28 57 kg/m²      Physical Exam  Cardiovascular:      Pulses: no weak pulses          Dorsalis pedis pulses are 2+ on the right side and 2+ on the left side  Feet:      Right foot:      Skin integrity: No ulcer, skin breakdown, erythema, warmth, callus or dry skin  Left foot:      Skin integrity: No ulcer, skin breakdown, erythema, warmth, callus or dry skin  Appears well  Lungs are clear  Heart regular  Abdomen soft and nontender  A very small healing wound on the bottom of the right foot  No signs of infection  A1c 6 6  Patient's shoes and socks removed  Right Foot/Ankle   Right Foot Inspection  Skin Exam: skin normal and skin intact  No dry skin, no warmth, no callus, no erythema, no maceration, no abnormal color, no pre-ulcer, no ulcer and no callus  Sensory   Monofilament testing: intact    Vascular  The right DP pulse is 2+  Left Foot/Ankle  Left Foot Inspection  Skin Exam: skin normal and skin intact  No dry skin, no warmth, no erythema, no maceration, normal color, no pre-ulcer, no ulcer and no callus  Sensory   Monofilament testing: intact    Vascular  The left DP pulse is 2+       Assign Risk Category  No deformity present  No loss of protective sensation  No weak pulses  Risk: 0                  Current Outpatient Medications:     acetaminophen (TYLENOL) 500 mg tablet, Take 500 mg by mouth as needed  , Disp: , Rfl:     cephalexin (KEFLEX) 250 mg capsule, , Disp: , Rfl:     clotrimazole-betamethasone (LOTRISONE) 1-0 05 % cream, Apply topically 2 (two) times a day, Disp: 45 g, Rfl: 3    cyanocobalamin (VITAMIN B-12) 1,000 mcg tablet, Take 2,000 mcg by mouth daily, Disp: , Rfl:     ergocalciferol (VITAMIN D2) 50,000 units, Take 1 capsule (50,000 Units total) by mouth once a week, Disp: 6 capsule, Rfl: 0    fluticasone (FLONASE) 50 mcg/act nasal spray, 1 spray into each nostril daily, Disp: 16 g, Rfl: 5    FREESTYLE LITE test strip, USE TO TEST BLOOD SUGAR AS DIRECTED, Disp: 100 each, Rfl: 5    hydrocortisone (ANUSOL-HC) 2 5 % rectal cream, Apply topically 2 (two) times a day, Disp: 28 g, Rfl: 0    Sod Picosulfate-Mag Ox-Cit Acd (Clenpiq) 10-3 5-12 MG-GM -GM/160ML SOLN, Use as directed per office instructions, Disp: 320 mL, Rfl: 0     No problem-specific Assessment & Plan notes found for this encounter  Diagnoses and all orders for this visit:    Type 2 diabetes mellitus without complication, without long-term current use of insulin ([facility])  -     POCT hemoglobin A1c    Gynecological disorder  -     Ambulatory Referral to Gynecology; Future    Spondylosis of cervical spine without myelopathy    Chronic low back pain without sciatica, unspecified back pain laterality    Other orders  -     cephalexin (KEFLEX) 250 mg capsule        Patient Instructions   Suggest that she take metformin every day as the A1c gives an average of the last 3 months and does not really matter what her blood sugar is on a particular day and she does not need to check her blood sugars at home  If she had an infection on the bottom of her foot, it appears to be resolved  Sometimes, the discomfort can last a little bit longer  Massage seems to help for her back pain  Taking Tylenol or ibuprofen or both would also be helpful  Referral to gyn for her gyn concern  A COVID booster is recommended  Recheck in 6 months    10% - bad control"> 10% - bad control,Hemoglobin A1c (HbA1c) greater than 10% indicating poor diabetic control,Haemoglobin A1c greater than 10% indicating poor diabetic control">   Diabetes Mellitus Type 2 in Adults, Ambulatory Care   GENERAL INFORMATION:   Diabetes mellitus type 2  is a disease that affects how your body uses glucose (sugar)  Insulin helps move sugar out of the blood so it can be used for energy  Normally, when the blood sugar level increases, the pancreas makes more insulin  Type 2 diabetes develops because either the body cannot make enough insulin, or it cannot use the insulin correctly  After many years, your pancreas may stop making insulin  Common symptoms include the following:   · More hunger or thirst than usual     · Frequent urination     · Weight loss without trying     · Blurred vision  Seek immediate care for the following symptoms:   · Severe abdominal pain, or pain that spreads to your back  You may also be vomiting  · Trouble staying awake or focusing    · Shaking or sweating    · Blurred or double vision    · Breath has a fruity, sweet smell    · Breathing is deep and labored, or rapid and shallow    · Heartbeat is fast and weak  Treatment for diabetes mellitus type 2  includes keeping your blood sugar at a normal level  You must eat the right foods, and exercise regularly  You may also need medicine if you cannot control your blood sugar level with nutrition and exercise  Manage diabetes mellitus type 2:   · Check your blood sugar level  You will be taught how to check a small drop of blood in a glucose monitor  Ask your healthcare provider when and how often to check during the day  Ask your healthcare provider what your blood sugar levels should be when you check them  · Keep track of carbohydrates (sugar and starchy foods)  Your blood sugar level can get too high if you eat too many carbohydrates  Your dietitian will help you plan meals and snacks that have the right amount of carbohydrates  · Eat low-fat foods  Some examples are skinless chicken and low-fat milk  · Eat less sodium (salt)    Some examples of high-sodium foods to limit are soy sauce, potato chips, and soup  Do not add salt to food you cook  Limit your use of table salt  · Eat high-fiber foods  Foods that are a good source of fiber include vegetables, whole grain bread, and beans  · Limit alcohol  Alcohol affects your blood sugar level and can make it harder to manage your diabetes  Women should limit alcohol to 1 drink a day  Men should limit alcohol to 2 drinks a day  A drink of alcohol is 12 ounces of beer, 5 ounces of wine, or 1½ ounces of liquor  · Get regular exercise  Exercise can help keep your blood sugar level steady, decrease your risk of heart disease, and help you lose weight  Exercise for at least 30 minutes, 5 days a week  Include muscle strengthening activities 2 days each week  Work with your healthcare provider to create an exercise plan  · Check your feet each day  for injuries or open sores  Ask your healthcare provider for activities you can do if you have an open sore  · Quit smoking  If you smoke, it is never too late to quit  Smoking can worsen the problems that may occur with diabetes  Ask your healthcare provider for information about how to stop smoking if you are having trouble quitting  · Ask about your weight:  Ask healthcare providers if you need to lose weight, and how much to lose  Ask them to help you with a weight loss program  Even a 10 to 15 pound weight loss can help you manage your blood sugar level  · Carry medical alert identification  Wear medical alert jewelry or carry a card that says you have diabetes  Ask your healthcare provider where to get these items  · Ask about vaccines  Diabetes puts you at risk of serious illness if you get the flu, pneumonia, or hepatitis  Ask your healthcare provider if you should get a flu, pneumonia, or hepatitis B vaccine, and when to get the vaccine  Follow up with your healthcare provider as directed:  Write down your questions so you remember to ask them during your visits     CARE AGREEMENT:   You have the right to help plan your care  Learn about your health condition and how it may be treated  Discuss treatment options with your caregivers to decide what care you want to receive  You always have the right to refuse treatment  The above information is an  only  It is not intended as medical advice for individual conditions or treatments  Talk to your doctor, nurse or pharmacist before following any medical regimen to see if it is safe and effective for you  © 2014 [address] is for End User's use only and may not be sold, redistributed or otherwise used for commercial purposes  All illustrations and images included in CareNotes® are the copyrighted property of [employer]  or [employer]

## 2022-06-30 NOTE — PATIENT INSTRUCTIONS
Suggest that she take metformin every day as the A1c gives an average of the last 3 months and does not really matter what her blood sugar is on a particular day and she does not need to check her blood sugars at home  If she had an infection on the bottom of her foot, it appears to be resolved  Sometimes, the discomfort can last a little bit longer  Massage seems to help for her back pain  Taking Tylenol or ibuprofen or both would also be helpful  Referral to gyn for her gyn concern  A COVID booster is recommended  Recheck in 6 months  10% - bad control"> 10% - bad control,Hemoglobin A1c (HbA1c) greater than 10% indicating poor diabetic control,Haemoglobin A1c greater than 10% indicating poor diabetic control">   Diabetes Mellitus Type 2 in Adults, Ambulatory Care   GENERAL INFORMATION:   Diabetes mellitus type 2  is a disease that affects how your body uses glucose (sugar)  Insulin helps move sugar out of the blood so it can be used for energy  Normally, when the blood sugar level increases, the pancreas makes more insulin  Type 2 diabetes develops because either the body cannot make enough insulin, or it cannot use the insulin correctly  After many years, your pancreas may stop making insulin  Common symptoms include the following:   · More hunger or thirst than usual     · Frequent urination     · Weight loss without trying     · Blurred vision  Seek immediate care for the following symptoms:   · Severe abdominal pain, or pain that spreads to your back  You may also be vomiting  · Trouble staying awake or focusing    · Shaking or sweating    · Blurred or double vision    · Breath has a fruity, sweet smell    · Breathing is deep and labored, or rapid and shallow    · Heartbeat is fast and weak  Treatment for diabetes mellitus type 2  includes keeping your blood sugar at a normal level  You must eat the right foods, and exercise regularly   You may also need medicine if you cannot control your blood sugar level with nutrition and exercise  Manage diabetes mellitus type 2:   · Check your blood sugar level  You will be taught how to check a small drop of blood in a glucose monitor  Ask your healthcare provider when and how often to check during the day  Ask your healthcare provider what your blood sugar levels should be when you check them  · Keep track of carbohydrates (sugar and starchy foods)  Your blood sugar level can get too high if you eat too many carbohydrates  Your dietitian will help you plan meals and snacks that have the right amount of carbohydrates  · Eat low-fat foods  Some examples are skinless chicken and low-fat milk  · Eat less sodium (salt)  Some examples of high-sodium foods to limit are soy sauce, potato chips, and soup  Do not add salt to food you cook  Limit your use of table salt  · Eat high-fiber foods  Foods that are a good source of fiber include vegetables, whole grain bread, and beans  · Limit alcohol  Alcohol affects your blood sugar level and can make it harder to manage your diabetes  Women should limit alcohol to 1 drink a day  Men should limit alcohol to 2 drinks a day  A drink of alcohol is 12 ounces of beer, 5 ounces of wine, or 1½ ounces of liquor  · Get regular exercise  Exercise can help keep your blood sugar level steady, decrease your risk of heart disease, and help you lose weight  Exercise for at least 30 minutes, 5 days a week  Include muscle strengthening activities 2 days each week  Work with your healthcare provider to create an exercise plan  · Check your feet each day  for injuries or open sores  Ask your healthcare provider for activities you can do if you have an open sore  · Quit smoking  If you smoke, it is never too late to quit  Smoking can worsen the problems that may occur with diabetes  Ask your healthcare provider for information about how to stop smoking if you are having trouble quitting       · Ask about your weight:  Ask healthcare providers if you need to lose weight, and how much to lose  Ask them to help you with a weight loss program  Even a 10 to 15 pound weight loss can help you manage your blood sugar level  · Carry medical alert identification  Wear medical alert jewelry or carry a card that says you have diabetes  Ask your healthcare provider where to get these items  · Ask about vaccines  Diabetes puts you at risk of serious illness if you get the flu, pneumonia, or hepatitis  Ask your healthcare provider if you should get a flu, pneumonia, or hepatitis B vaccine, and when to get the vaccine  Follow up with your healthcare provider as directed:  Write down your questions so you remember to ask them during your visits  CARE AGREEMENT:   You have the right to help plan your care  Learn about your health condition and how it may be treated  Discuss treatment options with your caregivers to decide what care you want to receive  You always have the right to refuse treatment  The above information is an  only  It is not intended as medical advice for individual conditions or treatments  Talk to your doctor, nurse or pharmacist before following any medical regimen to see if it is safe and effective for you  © 2014 [address] is for End User's use only and may not be sold, redistributed or otherwise used for commercial purposes  All illustrations and images included in CareNotes® are the copyrighted property of [employer]  or [employer]

## 2022-07-15 DIAGNOSIS — E11.9 TYPE 2 DIABETES MELLITUS WITHOUT COMPLICATION, WITHOUT LONG-TERM CURRENT USE OF INSULIN (HCC): ICD-10-CM

## 2022-10-04 ENCOUNTER — TELEPHONE (OUTPATIENT)
Dept: FAMILY MEDICINE CLINIC | Facility: CLINIC | Age: 47
End: 2022-10-04

## 2022-10-04 DIAGNOSIS — Z20.1 EXPOSURE TO TB: Primary | ICD-10-CM

## 2022-10-04 NOTE — TELEPHONE ENCOUNTER
Patient cam in to schedule a physical for her new job  She is in need of a chest x-ray because she cannot have a TB test done  Is requesting a routine chest x-ray be ordered  Please advise

## 2022-10-06 ENCOUNTER — RA CDI HCC (OUTPATIENT)
Dept: OTHER | Facility: HOSPITAL | Age: 47
End: 2022-10-06

## 2022-12-13 ENCOUNTER — VBI (OUTPATIENT)
Dept: ADMINISTRATIVE | Facility: OTHER | Age: 47
End: 2022-12-13

## 2023-01-20 ENCOUNTER — LAB (OUTPATIENT)
Dept: LAB | Facility: CLINIC | Age: 48
End: 2023-01-20

## 2023-01-20 DIAGNOSIS — Z20.1 EXPOSURE TO TB: ICD-10-CM

## 2023-01-23 LAB
GAMMA INTERFERON BACKGROUND BLD IA-ACNC: 0.15 IU/ML
M TB IFN-G BLD-IMP: POSITIVE
M TB IFN-G CD4+ BCKGRND COR BLD-ACNC: 1.34 IU/ML
M TB IFN-G CD4+ BCKGRND COR BLD-ACNC: 1.72 IU/ML
MITOGEN IGNF BCKGRD COR BLD-ACNC: >10 IU/ML

## 2023-01-23 NOTE — RESULT ENCOUNTER NOTE
Test for tuberculosis is positive  Latent tuberculosis means you have been exposed without symptoms  Needs to be treated  Please make an appointment to discuss

## 2023-01-25 ENCOUNTER — TELEMEDICINE (OUTPATIENT)
Dept: FAMILY MEDICINE CLINIC | Facility: CLINIC | Age: 48
End: 2023-01-25

## 2023-01-25 VITALS — WEIGHT: 177 LBS | HEIGHT: 66 IN | BODY MASS INDEX: 28.45 KG/M2

## 2023-01-25 DIAGNOSIS — Z22.7 LATENT TUBERCULOSIS BY BLOOD TEST: Primary | ICD-10-CM

## 2023-01-25 NOTE — PATIENT INSTRUCTIONS
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

## 2023-01-25 NOTE — PROGRESS NOTES
Virtual Regular Visit    Verification of patient location:    Patient is located in the following state in which I hold an active license [place]      Assessment/Plan:    Problem List Items Addressed This Visit     Latent tuberculosis by blood test - Primary    Relevant Orders    XR chest pa & lateral     Patient Instructions   Review of positive QuantiFERON which is actually not new for her  She declines prophylactic treatment  Chest x-ray is ordered to make sure there is no evidence of early or active tuberculosis  Reason for visit is   Chief Complaint   Patient presents with   • Follow-up     Tuberculosis test    • Virtual Regular Visit        Encounter provider Graham Chan MD    Provider located at [address] [place] [id number]      Recent Visits  No visits were found meeting these conditions  Showing recent visits within past 7 days and meeting all other requirements  Today's Visits  Date Type Provider Dept   01/25/23 Telemedicine Graham Chan MD [facility] today's visits and meeting all other requirements  Future Appointments  No visits were found meeting these conditions  Showing future appointments within next 150 days and meeting all other requirements       The patient was identified by name and date of birth  Gemma De Leon was informed that this is a telemedicine visit and that the visit is being conducted through the [employer]  She agrees to proceed     My office door was closed  No one else was in the room  She acknowledged consent and understanding of privacy and security of the video platform  The patient has agreed to participate and understands they can discontinue the visit at any time  Patient is aware this is a billable service  Subjective  Gemma De Leon is a 51 y o  female          HPI   Video visit done because she tested positive for TB with QuantiFERON test   States that she was positive a number of years ago  She took treatment for about 1 month but was told she could not take Tylenol if she needed it for pain so she stopped it  I was not aware that she was positive a number of years ago  She had asked for chest x-ray for her work  The QuantiFERON was ordered  Past Medical History:   Diagnosis Date   • Cervical radiculopathy 2019   • Chronic low back pain without sciatica 2022   • COVID-19 virus infection 11/15/2020   • DDD (degenerative disc disease), cervical 10/7/2019   • Generalized anxiety disorder 2018   • Hypertriglyceridemia 2018   • 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    Positive QuantiFERON gold  and years earlier  Treated for 1 month and got side effects  , declines further treatment  Prefers to have a chest x-ray needed for her employment  • Migraine without aura and without status migrainosus, not intractable 10/3/2019   • Type 2 diabetes mellitus without complication, without long-term [facility]       Past Surgical History:   Procedure Laterality Date   •  SECTION     • CHOLECYSTECTOMY     • TOTAL ABDOMINAL HYSTERECTOMY      Due to fibroids   Ovaries present       Current Outpatient Medications   Medication Sig Dispense Refill   • acetaminophen (TYLENOL) 500 mg tablet Take 500 mg by mouth as needed       • clotrimazole-betamethasone (LOTRISONE) 1-0 05 % cream Apply topically 2 (two) times a day 45 g 3   • cyanocobalamin (VITAMIN B-12) 1,000 mcg tablet Take 2,000 mcg by mouth daily     • ergocalciferol (VITAMIN D2) 50,000 units Take 1 capsule (50,000 Units total) by mouth once a week 6 capsule 0   • fluticasone (FLONASE) 50 mcg/act nasal spray 1 spray into each nostril daily 16 g 5   • FREESTYLE LITE test strip USE TO TEST BLOOD SUGAR AS DIRECTED 100 each 5   • hydrocortisone (ANUSOL-HC) 2 5 % rectal cream Apply topically 2 (two) times a day 28 g 0   • metFORMIN (GLUCOPHAGE) 500 mg tablet TAKE 1 TABLET BY MOUTH EVERY DAY WITH BREAKFAST 90 tablet 3   • cephalexin (KEFLEX) 250 mg capsule  (Patient not taking: Reported on 1/25/2023)     • Sod Picosulfate-Mag Ox-Cit Acd (Clenpiq) 10-3 5-12 MG-GM -GM/160ML SOLN Use as directed per office instructions (Patient not taking: Reported on 1/25/2023) 320 mL 0     No current facility-administered medications for this visit  Allergies   Allergen Reactions   • Bee Pollen Itching     Itchy, runny nose   • Dust Mite Extract Itching     Itchy, runny nose   • Pollen Extract Itching     Itchy, runny nose       Review of Systems    Video Exam    Vitals:    01/25/23 1346   Weight: 80 3 kg (177 lb)   Height: 5' 6" (1 676 m)       Physical Exam   Appears well  Breathing comfortably      I spent 10 minutes directly with the patient during this visit

## 2023-01-26 ENCOUNTER — HOSPITAL ENCOUNTER (OUTPATIENT)
Dept: RADIOLOGY | Facility: HOSPITAL | Age: 48
Discharge: HOME | End: 2023-01-26
Attending: FAMILY MEDICINE

## 2023-01-26 DIAGNOSIS — Z22.7 LATENT TUBERCULOSIS BY BLOOD TEST: ICD-10-CM

## 2023-02-08 ENCOUNTER — OFFICE VISIT (OUTPATIENT)
Dept: FAMILY MEDICINE CLINIC | Facility: CLINIC | Age: 48
End: 2023-02-08

## 2023-02-08 VITALS
HEIGHT: 66 IN | BODY MASS INDEX: 28.12 KG/M2 | WEIGHT: 175 LBS | DIASTOLIC BLOOD PRESSURE: 70 MMHG | RESPIRATION RATE: 16 BRPM | SYSTOLIC BLOOD PRESSURE: 100 MMHG | OXYGEN SATURATION: 99 % | HEART RATE: 94 BPM | TEMPERATURE: 98.2 F

## 2023-02-08 DIAGNOSIS — Z00.00 ANNUAL PHYSICAL EXAM: ICD-10-CM

## 2023-02-08 DIAGNOSIS — Z22.7 LATENT TUBERCULOSIS BY BLOOD TEST: ICD-10-CM

## 2023-02-08 DIAGNOSIS — M54.12 CERVICAL RADICULOPATHY: ICD-10-CM

## 2023-02-08 DIAGNOSIS — E11.9 TYPE 2 DIABETES MELLITUS WITHOUT COMPLICATION, WITHOUT LONG-TERM CURRENT USE OF INSULIN (HCC): Primary | ICD-10-CM

## 2023-02-08 DIAGNOSIS — G43.009 MIGRAINE WITHOUT AURA AND WITHOUT STATUS MIGRAINOSUS, NOT INTRACTABLE: ICD-10-CM

## 2023-02-08 DIAGNOSIS — Z12.31 ENCOUNTER FOR SCREENING MAMMOGRAM FOR BREAST CANCER: ICD-10-CM

## 2023-02-08 LAB — SL AMB POCT HEMOGLOBIN AIC: 6.8 (ref ?–6.5)

## 2023-02-08 RX ORDER — METFORMIN HYDROCHLORIDE 500 MG/1
1000 TABLET, EXTENDED RELEASE ORAL
Qty: 180 TABLET | Refills: 1 | Status: SHIPPED | OUTPATIENT
Start: 2023-02-08 | End: 2023-08-07

## 2023-02-08 RX ORDER — ATORVASTATIN CALCIUM 10 MG/1
10 TABLET, FILM COATED ORAL DAILY
Qty: 90 TABLET | Refills: 3 | Status: SHIPPED | OUTPATIENT
Start: 2023-02-08

## 2023-02-08 NOTE — PROGRESS NOTES
Chief Complaint   Patient presents with   • Physical Exam   • Headache     Bad headaches x 2 days   • Fatigue     X 1 week   • Dizziness        HPI   Here for follow-up of diabetes, latent tuberculosis, anxiety, cervical radiculopathy, migraines  As previously discussed, she has had a positive tuberculosis test prophylactic treatments of or over considering it  She did have a chest x-ray which was  Continues on metormin 500 mg daily  A1c today is 6 8  Deviously was 6 6  About a week ago, was feeling feverish and weak  For the last 2 days, complaints of headaches  Previous migraine about 6 months ago  Headache has resolved  Does complain of fatigue for the last week  Anticipates a 3-week trip to [facility] in March  She is due for mammogram   Colonoscopy up-to-date  Had a hysterectomy  BCG can cause a positive tuberculosis skin test   However, it does not affect QuantiFERON testing  QuantiFERON testing was positive so she should have prophylactic treatment with rifampin  Past Medical History:   Diagnosis Date   • Allergic    • Cervical radiculopathy 2019   • Chronic low back pain without sciatica 2022   • COVID-19 virus infection 11/15/2020   • DDD (degenerative disc disease), cervical 10/07/2019   • Diabetes mellitus ([facility] [address] )    • Generalized anxiety disorder 2018   • Headache(784 0)    • Hypertriglyceridemia 2018   • 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    Positive QuantiFERON gold  and years earlier  Treated for 1 month and got side effects  , declines further treatment  Prefers to have a chest x-ray needed for her employment     • Migraine without aura and without status migrainosus, not intractable 10/03/2019   • Type 2 diabetes mellitus without complication, without long-term current use of insulin ([address] ) 2021        Past Surgical History:   Procedure Laterality Date   •  SECTION     • CHOLECYSTECTOMY     • TOTAL ABDOMINAL HYSTERECTOMY Due to fibroids  Ovaries present       Social History     Tobacco Use   • Smoking status: Never   • Smokeless tobacco: Never   Substance Use Topics   • Alcohol use: No       Social History     Social History Narrative     since 1996  3 children  23,20, 18  All at home   19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 [employer]  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   works for the [employer]  Enjoys cooking  The following portions of the patient's history were reviewed and updated as appropriate: allergies, current medications, past family history, past medical history, past social history, past surgical history and problem list       Review of Systems       /70 (BP Location: Left arm, Patient Position: Sitting, Cuff Size: Large)   Pulse 94   Temp 98 2 °F (36 8 °C) (Temporal)   Resp 16   Ht 5' 6" (1 676 m)   Wt 79 4 kg (175 lb)   SpO2 99%   BMI 28 25 kg/m²      Physical Exam   Healthy appearing individual in no acute distress  Extraocular motions are intact  Both ear drums are white  Hearing is grossly intact  Throat reveals no erythema  Teeth are in good repair  No neck nodes or thyromegaly  Lungs are clear  Heart regular with no murmurs or gallops  Abdomen is soft and nontender  No leg edema  Skin reveals no apparent rash  Neurologic grossly within normal limits  Normal mood and affect  Musculoskeletal exam grossly within normal limits  A1c 6 8                Current Outpatient Medications:   •  acetaminophen (TYLENOL) 500 mg tablet, Take 500 mg by mouth as needed  , Disp: , Rfl:   •  atorvastatin (LIPITOR) 10 mg tablet, Take 1 tablet (10 mg total) by mouth daily, Disp: 90 tablet, Rfl: 3  •  clotrimazole-betamethasone (LOTRISONE) 1-0 05 % cream, Apply topically 2 (two) times a day, Disp: 45 g, Rfl: 3  •  fluticasone (FLONASE) 50 mcg/act nasal spray, 1 spray into each nostril daily, Disp: 16 g, Rfl: 5  •  FREESTYLE LITE test strip, USE TO TEST BLOOD SUGAR AS DIRECTED, Disp: 100 each, Rfl: 5  • hydrocortisone (ANUSOL-HC) 2 5 % rectal cream, Apply topically 2 (two) times a day, Disp: 28 g, Rfl: 0  •  metFORMIN (GLUCOPHAGE-XR) 500 mg 24 hr tablet, Take 2 tablets (1,000 mg total) by mouth daily with breakfast, Disp: 180 tablet, Rfl: 1     No problem-specific Assessment & Plan notes found for this encounter  Diagnoses and all orders for this visit:    Annual physical exam    Type 2 diabetes mellitus without complication, without long-term current use of insulin ([facility] [address] )  -     POCT hemoglobin A1c  -     metFORMIN (GLUCOPHAGE-XR) 500 mg 24 hr tablet; Take 2 tablets (1,000 mg total) by mouth daily with breakfast  -     atorvastatin (LIPITOR) 10 mg tablet; Take 1 tablet (10 mg total) by mouth daily    Encounter for screening mammogram for breast cancer  -     Mammo screening bilateral w 3d & cad; Future    Migraine without aura and without status migrainosus, not intractable    Latent tuberculosis by blood test    Cervical radiculopathy        Patient Instructions     As far as health maintenance, prescription given for mammogram   Colonoscopy is up-to-date  Recommend getting the COVID booster at local drugstore, especially since she is leaving for [facility]  Diabetes still controlled with an A1c of 6 8  However, will increase metformin to 1000 mg daily  Begin atorvastatin 10 mg daily  Because of her positive QuantiFERON, she should be treated with rifampin for 4 months  We will wait until after she returns from her trip to [facility]  Observation of migraines  Review of her MRI of her neck which showed disease in her neck but symptoms are tolerable  Recheck in 4 months  After her son's wedding, will initiate prophylactic treatment for latent tuberculosis which    Wellness Visit for Adults   AMBULATORY CARE:   A wellness visit  is when you see your healthcare provider to get screened for health problems  Your healthcare provider will also give you advice on how to stay healthy   Write down your questions so you remember to ask them  Ask your healthcare provider how often you should have a wellness visit  What happens at a wellness visit:  Your healthcare provider will ask about your health, and your family history of health problems  This includes high blood pressure, heart disease, and cancer  He or she will ask if you have symptoms that concern you, if you smoke, and about your mood  You may also be asked about your intake of medicines, supplements, food, and alcohol  Any of the following may be done:  · Your weight  will be checked  Your height may also be checked so your body mass index (BMI) can be calculated  Your BMI shows if you are at a healthy weight  · Your blood pressure  and heart rate will be checked  Your temperature may also be checked  · Blood and urine tests  may be done  Blood tests may be done to check your cholesterol levels  Abnormal cholesterol levels increase your risk for heart disease and stroke  You may also need a blood or urine test to check for diabetes if you are at increased risk  Urine tests may be done to look for signs of an infection or kidney disease  · A physical exam  includes checking your heartbeat and lungs with a stethoscope  Your healthcare provider may also check your skin to look for sun damage  · Screening tests  may be recommended  A screening test is done to check for diseases that may not cause symptoms  The screening tests you may need depend on your age, gender, family history, and lifestyle habits  For example, colorectal screening may be recommended if you are 48years old or older  Screening tests you need if you are a woman:   · A Pap smear  is used to screen for cervical cancer  Pap smears are usually done every 3 to 5 years depending on your age  You may need them more often if you have had abnormal Pap smear test results in the past  Ask your healthcare provider how often you should have a Pap smear      · A mammogram  is an x-ray of your breasts to screen for breast cancer  Experts recommend mammograms every 2 years starting at age 48 years  You may need a mammogram at age 52 years or younger if you have an increased risk for breast cancer  Talk to your healthcare provider about when you should start having mammograms and how often you need them  Vaccines you may need:   · Get an influenza vaccine  every year  The influenza vaccine protects you from the flu  Several types of viruses cause the flu  The viruses change over time, so new vaccines are made each year  · Get a tetanus-diphtheria (Td) booster vaccine  every 10 years  This vaccine protects you against tetanus and diphtheria  Tetanus is a severe infection that may cause painful muscle spasms and lockjaw  Diphtheria is a severe bacterial infection that causes a thick covering in the back of your mouth and throat  · Get a human papillomavirus (HPV) vaccine  if you are female and aged 23 to 32 or male 23 to 24 and never received it  This vaccine protects you from HPV infection  HPV is the most common infection spread by sexual contact  HPV may also cause vaginal, penile, and anal cancers  · Get a pneumococcal vaccine  if you are aged 72 years or older  The pneumococcal vaccine is an injection given to protect you from pneumococcal disease  Pneumococcal disease is an infection caused by pneumococcal bacteria  The infection may cause pneumonia, meningitis, or an ear infection  · Get a shingles vaccine  if you are 60 or older, even if you have had shingles before  The shingles vaccine is an injection to protect you from the varicella-zoster virus  This is the same virus that causes chickenpox  Shingles is a painful rash that develops in people who had chickenpox or have been exposed to the virus  How to eat healthy:  My Plate is a model for planning healthy meals  It shows the types and amounts of foods that should go on your plate   Fruits and vegetables make up about half of your plate, and grains and protein make up the other half  A serving of dairy is included on the side of your plate  The amount of calories and serving sizes you need depends on your age, gender, weight, and height  Examples of healthy foods are listed below:  · Eat a variety of vegetables  such as dark green, red, and orange vegetables  You can also include canned vegetables low in sodium (salt) and frozen vegetables without added butter or sauces  · Eat a variety of fresh fruits , canned fruit in 100% juice, frozen fruit, and dried fruit  · Include whole grains  At least half of the grains you eat should be whole grains  Examples include whole-wheat bread, wheat pasta, brown rice, and whole-grain cereals such as oatmeal     · Eat a variety of protein foods such as seafood (fish and shellfish), lean meat, and poultry without skin (turkey and chicken)  Examples of lean meats include pork leg, shoulder, or tenderloin, and beef round, sirloin, tenderloin, and extra lean ground beef  Other protein foods include eggs and egg substitutes, beans, peas, soy products, nuts, and seeds  · Choose low-fat dairy products such as skim or 1% milk or low-fat yogurt, cheese, and cottage cheese  · Limit unhealthy fats  such as butter, hard margarine, and shortening  Exercise:  Exercise at least 30 minutes per day on most days of the week  Some examples of exercise include walking, biking, dancing, and swimming  You can also fit in more physical activity by taking the stairs instead of the elevator or parking farther away from stores  Include muscle strengthening activities 2 days each week  Regular exercise provides many health benefits  It helps you manage your weight, and decreases your risk for type 2 diabetes, heart disease, stroke, and high blood pressure  Exercise can also help improve your mood  Ask your healthcare provider about the best exercise plan for you         General health and safety guidelines:   · Do not smoke   Nicotine and other chemicals in cigarettes and cigars can cause lung damage  Ask your healthcare provider for information if you currently smoke and need help to quit  E-cigarettes or smokeless tobacco still contain nicotine  Talk to your healthcare provider before you use these products  · Limit alcohol  A drink of alcohol is 12 ounces of beer, 5 ounces of wine, or 1½ ounces of liquor  · Lose weight, if needed  Being overweight increases your risk of certain health conditions  These include heart disease, high blood pressure, type 2 diabetes, and certain types of cancer  · Protect your skin  Do not sunbathe or use tanning beds  Use sunscreen with a SPF 15 or higher  Apply sunscreen at least 15 minutes before you go outside  Reapply sunscreen every 2 hours  Wear protective clothing, hats, and sunglasses when you are outside  · Drive safely  Always wear your seatbelt  Make sure everyone in your car wears a seatbelt  A seatbelt can save your life if you are in an accident  Do not use your cell phone when you are driving  This could distract you and cause an accident  Pull over if you need to make a call or send a text message  · Practice safe sex  Use latex condoms if are sexually active and have more than one partner  Your healthcare provider may recommend screening tests for sexually transmitted infections (STIs)  · Wear helmets, lifejackets, and protective gear  Always wear a helmet when you ride a bike or motorcycle, go skiing, or play sports that could cause a head injury  Wear protective equipment when you play sports  Wear a lifejacket when you are on a boat or doing water sports  © Copyright [employer] 2022 Information is for End User's use only and may not be sold, redistributed or otherwise used for commercial purposes   All illustrations and images included in CareNotes® are the copyrighted property of [employer]  or [employer]  The above information is an  only  It is not intended as medical advice for individual conditions or treatments  Talk to your doctor, nurse or pharmacist before following any medical regimen to see if it is safe and effective for you

## 2023-02-08 NOTE — PATIENT INSTRUCTIONS
As far as health maintenance, prescription given for mammogram   Colonoscopy is up-to-date  Recommend getting the COVID booster at local drugstore, especially since she is leaving for [facility]  Diabetes still controlled with an A1c of 6 8  However, will increase metformin to 1000 mg daily  Begin atorvastatin 10 mg daily  Because of her positive QuantiFERON, she should be treated with rifampin for 4 months  We will wait until after she returns from her trip to [facility]  Observation of migraines  Review of her MRI of her neck which showed disease in her neck but symptoms are tolerable  Recheck in 4 months  After her son's wedding, will initiate prophylactic treatment for latent tuberculosis which    Wellness Visit for Adults   AMBULATORY CARE:   A wellness visit  is when you see your healthcare provider to get screened for health problems  Your healthcare provider will also give you advice on how to stay healthy  Write down your questions so you remember to ask them  Ask your healthcare provider how often you should have a wellness visit  What happens at a wellness visit:  Your healthcare provider will ask about your health, and your family history of health problems  This includes high blood pressure, heart disease, and cancer  He or she will ask if you have symptoms that concern you, if you smoke, and about your mood  You may also be asked about your intake of medicines, supplements, food, and alcohol  Any of the following may be done: Your weight  will be checked  Your height may also be checked so your body mass index (BMI) can be calculated  Your BMI shows if you are at a healthy weight  Your blood pressure  and heart rate will be checked  Your temperature may also be checked  Blood and urine tests  may be done  Blood tests may be done to check your cholesterol levels  Abnormal cholesterol levels increase your risk for heart disease and stroke   You may also need a blood or urine test to check for diabetes if you are at increased risk  Urine tests may be done to look for signs of an infection or kidney disease  A physical exam  includes checking your heartbeat and lungs with a stethoscope  Your healthcare provider may also check your skin to look for sun damage  Screening tests  may be recommended  A screening test is done to check for diseases that may not cause symptoms  The screening tests you may need depend on your age, gender, family history, and lifestyle habits  For example, colorectal screening may be recommended if you are 48years old or older  Screening tests you need if you are a woman:   A Pap smear  is used to screen for cervical cancer  Pap smears are usually done every 3 to 5 years depending on your age  You may need them more often if you have had abnormal Pap smear test results in the past  Ask your healthcare provider how often you should have a Pap smear  A mammogram  is an x-ray of your breasts to screen for breast cancer  Experts recommend mammograms every 2 years starting at age 48 years  You may need a mammogram at age 52 years or younger if you have an increased risk for breast cancer  Talk to your healthcare provider about when you should start having mammograms and how often you need them  Vaccines you may need:   Get an influenza vaccine  every year  The influenza vaccine protects you from the flu  Several types of viruses cause the flu  The viruses change over time, so new vaccines are made each year  Get a tetanus-diphtheria (Td) booster vaccine  every 10 years  This vaccine protects you against tetanus and diphtheria  Tetanus is a severe infection that may cause painful muscle spasms and lockjaw  Diphtheria is a severe bacterial infection that causes a thick covering in the back of your mouth and throat  Get a human papillomavirus (HPV) vaccine  if you are female and aged 23 to 32 or male 23 to 24 and never received it   This vaccine protects you from HPV infection  HPV is the most common infection spread by sexual contact  HPV may also cause vaginal, penile, and anal cancers  Get a pneumococcal vaccine  if you are aged 72 years or older  The pneumococcal vaccine is an injection given to protect you from pneumococcal disease  Pneumococcal disease is an infection caused by pneumococcal bacteria  The infection may cause pneumonia, meningitis, or an ear infection  Get a shingles vaccine  if you are 60 or older, even if you have had shingles before  The shingles vaccine is an injection to protect you from the varicella-zoster virus  This is the same virus that causes chickenpox  Shingles is a painful rash that develops in people who had chickenpox or have been exposed to the virus  How to eat healthy:  My Plate is a model for planning healthy meals  It shows the types and amounts of foods that should go on your plate  Fruits and vegetables make up about half of your plate, and grains and protein make up the other half  A serving of dairy is included on the side of your plate  The amount of calories and serving sizes you need depends on your age, gender, weight, and height  Examples of healthy foods are listed below:  Eat a variety of vegetables  such as dark green, red, and orange vegetables  You can also include canned vegetables low in sodium (salt) and frozen vegetables without added butter or sauces  Eat a variety of fresh fruits , canned fruit in 100% juice, frozen fruit, and dried fruit  Include whole grains  At least half of the grains you eat should be whole grains  Examples include whole-wheat bread, wheat pasta, brown rice, and whole-grain cereals such as oatmeal     Eat a variety of protein foods such as seafood (fish and shellfish), lean meat, and poultry without skin (turkey and chicken)  Examples of lean meats include pork leg, shoulder, or tenderloin, and beef round, sirloin, tenderloin, and extra lean ground beef   Other protein foods include eggs and egg substitutes, beans, peas, soy products, nuts, and seeds  Choose low-fat dairy products such as skim or 1% milk or low-fat yogurt, cheese, and cottage cheese  Limit unhealthy fats  such as butter, hard margarine, and shortening  Exercise:  Exercise at least 30 minutes per day on most days of the week  Some examples of exercise include walking, biking, dancing, and swimming  You can also fit in more physical activity by taking the stairs instead of the elevator or parking farther away from stores  Include muscle strengthening activities 2 days each week  Regular exercise provides many health benefits  It helps you manage your weight, and decreases your risk for type 2 diabetes, heart disease, stroke, and high blood pressure  Exercise can also help improve your mood  Ask your healthcare provider about the best exercise plan for you  General health and safety guidelines:   Do not smoke  Nicotine and other chemicals in cigarettes and cigars can cause lung damage  Ask your healthcare provider for information if you currently smoke and need help to quit  E-cigarettes or smokeless tobacco still contain nicotine  Talk to your healthcare provider before you use these products  Limit alcohol  A drink of alcohol is 12 ounces of beer, 5 ounces of wine, or 1½ ounces of liquor  Lose weight, if needed  Being overweight increases your risk of certain health conditions  These include heart disease, high blood pressure, type 2 diabetes, and certain types of cancer  Protect your skin  Do not sunbathe or use tanning beds  Use sunscreen with a SPF 15 or higher  Apply sunscreen at least 15 minutes before you go outside  Reapply sunscreen every 2 hours  Wear protective clothing, hats, and sunglasses when you are outside  Drive safely  Always wear your seatbelt  Make sure everyone in your car wears a seatbelt  A seatbelt can save your life if you are in an accident   Do not use your cell phone when you are driving  This could distract you and cause an accident  Pull over if you need to make a call or send a text message  Practice safe sex  Use latex condoms if are sexually active and have more than one partner  Your healthcare provider may recommend screening tests for sexually transmitted infections (STIs)  Wear helmets, lifejackets, and protective gear  Always wear a helmet when you ride a bike or motorcycle, go skiing, or play sports that could cause a head injury  Wear protective equipment when you play sports  Wear a lifejacket when you are on a boat or doing water sports  © Copyright [employer] 2022 Information is for End User's use only and may not be sold, redistributed or otherwise used for commercial purposes  All illustrations and images included in CareNotes® are the copyrighted property of [employer]  or [employer]  The above information is an  only  It is not intended as medical advice for individual conditions or treatments  Talk to your doctor, nurse or pharmacist before following any medical regimen to see if it is safe and effective for you

## 2023-03-06 ENCOUNTER — OFFICE VISIT (OUTPATIENT)
Dept: DERMATOLOGY | Facility: CLINIC | Age: 48
End: 2023-03-06

## 2023-03-06 VITALS — WEIGHT: 173 LBS | BODY MASS INDEX: 27.92 KG/M2 | TEMPERATURE: 97 F

## 2023-03-06 DIAGNOSIS — D36.10 NEUROFIBROMA: Primary | ICD-10-CM

## 2023-03-06 NOTE — PROGRESS NOTES
[facility] Note     Patient Name: Mikala Holland  Encounter Date: 3/6/23    Have you been cared for by a [place] [address] Dermatologist in the last 3 years and, if so, which description applies to you? NO  I am considered a "new" patient and must complete all patient intake questions  I am FEMALE/of child-bearing potential     REVIEW OF SYSTEMS:  Have you recently had or currently have any of the following? · Recent fever or chills? No  · Any non-healing wound? No  · Are you pregnant or planning to become pregnant? No  · Are you currently or planning to be nursing or breast feeding? No   PAST MEDICAL HISTORY:  Have you personally ever had or currently have any of the following? If "YES," then please provide more detail  · Skin cancer (such as Melanoma, Basal Cell Carcinoma, Squamous Cell Carcinoma? No  · Tuberculosis, HIV/AIDS, Hepatitis B or C: No  · Systemic Immunosuppression such as Diabetes, Biologic or Immunotherapy, Chemotherapy, Organ Transplantation, Bone Marrow Transplantation YES, diabetes  · Radiation Treatment No   FAMILY HISTORY:  Any "first degree relatives" (parent, brother, sister, or child) with the following? • Skin Cancer, Pancreatic or Other Cancer? No   PATIENT EXPERIENCE:    • Do you want the Dermatologist to perform a COMPLETE skin exam today including a clinical examination under the "bra and underwear" areas? NO  • If necessary, do we have your permission to call and leave a detailed message on your Preferred Phone number that includes your specific medical information?   Yes      Allergies   Allergen Reactions   • Bee Pollen Itching     Itchy, runny nose   • Dust Mite Extract Itching     Itchy, runny nose   • Pollen Extract Itching     Itchy, runny nose      Current Outpatient Medications:   •  acetaminophen (TYLENOL) 500 mg tablet, Take 500 mg by mouth as needed  , Disp: , Rfl:   •  atorvastatin (LIPITOR) 10 mg tablet, Take 1 tablet (10 mg total) by mouth daily, Disp: 90 tablet, Rfl: 3  •  clotrimazole-betamethasone (LOTRISONE) 1-0 05 % cream, Apply topically 2 (two) times a day, Disp: 45 g, Rfl: 3  •  fluticasone (FLONASE) 50 mcg/act nasal spray, 1 spray into each nostril daily, Disp: 16 g, Rfl: 5  •  FREESTYLE LITE test strip, USE TO TEST BLOOD SUGAR AS DIRECTED, Disp: 100 each, Rfl: 5  •  metFORMIN (GLUCOPHAGE-XR) 500 mg 24 hr tablet, Take 2 tablets (1,000 mg total) by mouth daily with breakfast, Disp: 180 tablet, Rfl: 1  •  hydrocortisone (ANUSOL-HC) 2 5 % rectal cream, Apply topically 2 (two) times a day (Patient not taking: Reported on 3/6/2023), Disp: 28 g, Rfl: 0          • Whom besides the patient is providing clinical information about today's encounter?   o NO ADDITIONAL HISTORIAN (patient alone provided history)    Physical Exam and Assessment/Plan by Diagnosis:        SUSPECTED FIBROLIPOMA  Physical Exam:  • Anatomic Location Affected:  Right abdomen  • Morphological Description: see photo  • Pertinent Positives:  • Pertinent Negatives:     Additional History of Present Condition:  Patient noted that has grown in size has had for 6 + years    Assessment and Plan:  Based on a thorough discussion of this condition and the management approach to it (including a comprehensive discussion of the known risks, side effects and potential benefits of treatment), the patient (family) agrees to implement the following specific plan:  • Can consider cosmetic removal; not covered under insurance; could cost up to $250 or more for removal        Scribe Attestation    I,:  Andrew Lorenzo am acting as a scribe while in the presence of the attending physician :       I,:  Marily Bradford MD personally performed the services described in this documentation    as scribed in my presence :

## 2023-03-06 NOTE — PATIENT INSTRUCTIONS
SUSPECTED FIBROLIPOMA  Assessment and Plan:  Based on a thorough discussion of this condition and the management approach to it (including a comprehensive discussion of the known risks, side effects and potential benefits of treatment), the patient (family) agrees to implement the following specific plan:  Can consider cosmetic removal; not covered under insurance; could cost up to $250 or more for removal

## 2023-05-11 ENCOUNTER — OFFICE VISIT (OUTPATIENT)
Dept: FAMILY MEDICINE CLINIC | Facility: CLINIC | Age: 48
End: 2023-05-11

## 2023-05-11 VITALS
BODY MASS INDEX: 28.28 KG/M2 | DIASTOLIC BLOOD PRESSURE: 78 MMHG | WEIGHT: 176 LBS | RESPIRATION RATE: 18 BRPM | OXYGEN SATURATION: 99 % | HEART RATE: 91 BPM | SYSTOLIC BLOOD PRESSURE: 120 MMHG | HEIGHT: 66 IN | TEMPERATURE: 98.4 F

## 2023-05-11 DIAGNOSIS — R53.83 FATIGUE, UNSPECIFIED TYPE: ICD-10-CM

## 2023-05-11 DIAGNOSIS — J30.2 SEASONAL ALLERGIES: ICD-10-CM

## 2023-05-11 DIAGNOSIS — E78.1 HYPERTRIGLYCERIDEMIA: ICD-10-CM

## 2023-05-11 DIAGNOSIS — E11.9 TYPE 2 DIABETES MELLITUS WITHOUT COMPLICATION, WITHOUT LONG-TERM CURRENT USE OF INSULIN (HCC): ICD-10-CM

## 2023-05-11 DIAGNOSIS — E55.9 VITAMIN D DEFICIENCY: ICD-10-CM

## 2023-05-11 DIAGNOSIS — Z13.5 SCREENING FOR DIABETIC RETINOPATHY: ICD-10-CM

## 2023-05-11 DIAGNOSIS — L29.8 NASAL ITCHING: ICD-10-CM

## 2023-05-11 DIAGNOSIS — Z12.31 ENCOUNTER FOR SCREENING MAMMOGRAM FOR MALIGNANT NEOPLASM OF BREAST: ICD-10-CM

## 2023-05-11 DIAGNOSIS — R23.8 SCALP IRRITATION: Primary | ICD-10-CM

## 2023-05-11 LAB
LEFT EYE DIABETIC RETINOPATHY: NORMAL
LEFT EYE IMAGE QUALITY: NORMAL
LEFT EYE MACULAR EDEMA: NORMAL
LEFT EYE OTHER RETINOPATHY: NORMAL
RIGHT EYE DIABETIC RETINOPATHY: NORMAL
RIGHT EYE IMAGE QUALITY: NORMAL
RIGHT EYE MACULAR EDEMA: NORMAL
RIGHT EYE OTHER RETINOPATHY: NORMAL
SEVERITY (EYE EXAM): NORMAL
SL AMB POCT HEMOGLOBIN AIC: 6.8 (ref ?–6.5)

## 2023-05-11 RX ORDER — METFORMIN HYDROCHLORIDE 500 MG/1
500 TABLET, EXTENDED RELEASE ORAL
Qty: 180 TABLET | Refills: 1 | Status: SHIPPED | OUTPATIENT
Start: 2023-05-11 | End: 2024-05-05

## 2023-05-11 RX ORDER — OLOPATADINE HYDROCHLORIDE 1 MG/ML
1 SOLUTION/ DROPS OPHTHALMIC 2 TIMES DAILY
Qty: 5 ML | Refills: 5 | Status: SHIPPED | OUTPATIENT
Start: 2023-05-11

## 2023-05-11 RX ORDER — TRIAMCINOLONE ACETONIDE 0.15 MG/G
AEROSOL, SPRAY TOPICAL 2 TIMES DAILY
Qty: 63 G | Refills: 5 | Status: SHIPPED | OUTPATIENT
Start: 2023-05-11

## 2023-05-11 RX ORDER — FLUTICASONE PROPIONATE 50 MCG
1 SPRAY, SUSPENSION (ML) NASAL DAILY
Qty: 48 G | Refills: 3 | Status: SHIPPED | OUTPATIENT
Start: 2023-05-11

## 2023-05-11 NOTE — PROGRESS NOTES
Chief Complaint   Patient presents with   • Allergies     Very itchy scalp and skin, sneezing a lot   Patient states she take ODT Zyrtec         HPI   Here because she had itching in her scalp with little bumps and pimples  This occurred after she was in [facility] for 3 weeks  Symptoms present for about a month  It has subsided somewhat  Was doing home remedies that she found on Dr Puente Annmarie  Has not been taking metformin or her atorvastatin  Complains of being fatigued  Also bothered by allergies with itchy eyes and nose  Using Zyrtec  Past Medical History:   Diagnosis Date   • Allergic    • Cervical radiculopathy 2019   • Chronic low back pain without sciatica 2022   • COVID-19 virus infection 11/15/2020   • DDD (degenerative disc disease), cervical 10/07/2019   • Diabetes mellitus ([facility] [address] )    • Generalized anxiety disorder 2018   • Headache(784 0)    • Hypertriglyceridemia 2018   • 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    Positive QuantiFERON gold  and years earlier  Treated for 1 month and got side effects  , declines further treatment  Prefers to have a chest x-ray needed for her employment  • Migraine without aura and without status migrainosus, not intractable 10/03/2019   • Type 2 diabetes mellitus without complication, without long-term current use of insulin ([facility] [address] ) 2021        Past Surgical History:   Procedure Laterality Date   •  SECTION     • CHOLECYSTECTOMY     • TOTAL ABDOMINAL HYSTERECTOMY      Due to fibroids  Ovaries present       Social History     Tobacco Use   • Smoking status: Never   • Smokeless tobacco: Never   Substance Use Topics   • Alcohol use: No       Social History     Social History Narrative     since   3 children  23,20, 18  All at home   21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 [employer]  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   works for the [employer]  Enjoys cooking           The following portions of the patient's history were "reviewed and updated as appropriate: allergies, current medications, past family history, past medical history, past social history, past surgical history and problem list       Review of Systems       /78 (BP Location: Left arm, Patient Position: Sitting, Cuff Size: Standard)   Pulse 91   Temp 98 4 °F (36 9 °C) (Temporal)   Resp 18   Ht 5' 6\" (1 676 m)   Wt 79 8 kg (176 lb)   SpO2 99%   BMI 28 41 kg/m²      Physical Exam   Appears well  No scaling or inflamed colliculi seen in the scalp  No dandruff  Both ear canals are wide open  No wax seen  Eardrums normal   Lungs are clear  Heart regular  Abdomen soft and nontender  A1c 6 8  Current Outpatient Medications:   •  acetaminophen (TYLENOL) 500 mg tablet, Take 500 mg by mouth as needed  , Disp: , Rfl:   •  atorvastatin (LIPITOR) 10 mg tablet, Take 1 tablet (10 mg total) by mouth daily, Disp: 90 tablet, Rfl: 3  •  clotrimazole-betamethasone (LOTRISONE) 1-0 05 % cream, Apply topically 2 (two) times a day, Disp: 45 g, Rfl: 3  •  fluticasone (FLONASE) 50 mcg/act nasal spray, 1 spray into each nostril daily, Disp: 16 g, Rfl: 5  •  FREESTYLE LITE test strip, USE TO TEST BLOOD SUGAR AS DIRECTED, Disp: 100 each, Rfl: 5  •  metFORMIN (GLUCOPHAGE-XR) 500 mg 24 hr tablet, Take 2 tablets (1,000 mg total) by mouth daily with breakfast, Disp: 180 tablet, Rfl: 1  •  hydrocortisone (ANUSOL-HC) 2 5 % rectal cream, Apply topically 2 (two) times a day (Patient not taking: Reported on 3/6/2023), Disp: 28 g, Rfl: 0     No problem-specific Assessment & Plan notes found for this encounter         Diagnoses and all orders for this visit:    Scalp irritation    Fatigue, unspecified type    Type 2 diabetes mellitus without complication, without long-term current use of insulin ([facility])  -     POCT hemoglobin A1c  -     Microalbumin, Random Urine (W/Creatinine) ([facility] ONLY)    Hypertriglyceridemia    Seasonal allergies    Vitamin D deficiency    Encounter for " screening mammogram for malignant neoplasm of breast        Patient Instructions   Nothing seen in the scalp but will use Kenalog spray twice daily if needed for itching  Diabetes well controlled with A1c of 6 8  Suggest metformin and atorvastatin once daily  For fatigue, will check CBC, chemistry, and thyroid  Will check vitamin D level because of her history of low vitamin D  Also recheck lipid profile  Continue Zyrtec for allergies  Patanol eyedrops 1 drop each eye twice daily for itchy eyes  Prescription given for mammogram   Recheck in 6 months

## 2023-05-11 NOTE — PATIENT INSTRUCTIONS
Nothing seen in the scalp but will use Kenalog spray twice daily if needed for itching  Diabetes well controlled with A1c of 6 8  Suggest metformin and atorvastatin once daily  For fatigue, will check CBC, chemistry, and thyroid  Will check vitamin D level because of her history of low vitamin D  Also recheck lipid profile  Continue Zyrtec for allergies  Patanol eyedrops 1 drop each eye twice daily for itchy eyes  Prescription given for mammogram   Recheck in 6 months

## 2023-05-12 LAB
ALBUMIN/CREAT UR: 4 MCG/MG CREAT
CREAT UR-MCNC: 132 MG/DL (ref 20–275)
MICROALBUMIN UR-MCNC: 0.5 MG/DL

## 2023-10-24 ENCOUNTER — TELEPHONE (OUTPATIENT)
Dept: NEUROSURGERY | Facility: CLINIC | Age: 48
End: 2023-10-24

## 2023-10-24 ENCOUNTER — TELEPHONE (OUTPATIENT)
Age: 48
End: 2023-10-24

## 2023-10-24 NOTE — TELEPHONE ENCOUNTER
PT called requesting appt with Dr Maricarmen Elaine for Left shoulder and left arm painful, weakness, no issue with fine finger motion  PT got shot in shoulder by Ortho while travelling in [facility] 10/5/23 and got relief but now the pain is back  No new imaging at [address] since 11/12/2021 so placed with AP    Called pt left VM because we need to [phone] or 1 but can not see her in [facility] at 36 per Carvel Landau she needs 45 min and appt I booked is 27.  Requested pt to call us back at  opt [address]

## 2023-10-24 NOTE — TELEPHONE ENCOUNTER
Caller: Patient     Doctor/Office: Neuro    Call regarding :  appt    Call was transferred to: Neuro

## 2023-10-25 ENCOUNTER — OFFICE VISIT (OUTPATIENT)
Dept: GASTROENTEROLOGY | Facility: CLINIC | Age: 48
End: 2023-10-25
Payer: COMMERCIAL

## 2023-10-25 VITALS
BODY MASS INDEX: 28.28 KG/M2 | SYSTOLIC BLOOD PRESSURE: 116 MMHG | TEMPERATURE: 96.3 F | DIASTOLIC BLOOD PRESSURE: 68 MMHG | HEIGHT: 66 IN | WEIGHT: 176 LBS

## 2023-10-25 DIAGNOSIS — R14.0 ABDOMINAL BLOATING: Primary | ICD-10-CM

## 2023-10-25 DIAGNOSIS — K59.00 CONSTIPATION, UNSPECIFIED CONSTIPATION TYPE: ICD-10-CM

## 2023-10-25 PROCEDURE — 99214 OFFICE O/P EST MOD 30 MIN: CPT | Performed by: PHYSICIAN ASSISTANT

## 2023-10-25 RX ORDER — ERGOCALCIFEROL 1.25 MG/1
1 CAPSULE ORAL WEEKLY
COMMUNITY

## 2023-10-25 NOTE — PROGRESS NOTES
[facility] - Outpatient Follow-up Note  Naina Kebede 51 y.o. female MRN: [id number]  Encounter: [id number]    ASSESSMENT AND PLAN:    1. Abdominal bloating  2. Constipation, unspecified constipation type  Patient complains of abdominal bloating for years. She does have occasional constipation. No red flag or alarm symptoms. We reviewed her last EGD and colonoscopy (UTD) along with the biopsy results/findings. All questions answered. Patient expressed understanding to those results. At this time we will check CBC and CMP   Will obtain an x-ray to assess patient's stool burden, we discussed that bloating may be due to underlying constipation  We will obtain complete abdominal ultrasound as well  I recommended patient to try Gas-X over-the-counter as needed for symptoms and monitor response     Can consider SIBO testing in follow up     - CBC; Future  - US abdomen complete; Future  - XR abdomen 1 view kub; Future  - Comprehensive metabolic panel; Future      Patient was instructed to call the office with any questions, concerns, new/ worsening/ persisting GI symptoms. Advised patient go to the ER with any severe or worsening abdominal pain, fevers/ chills, intractable N/V, chest pain, SOB, dizziness, lightheadedness, feeling something stuck in esophagus that will not go down. Patient expressed understanding and is in agreement with treatment plan. Will plan to follow up after diagnostic tests   __________________________________________________________    SUBJECTIVE:      Patient is new to me. Patient with a PMH of DM, migraines, DD, anxiety, fatty liver, prior cholecystectomy, presents for follow up. She was last seen in the office 11/2021, previous office note reviewed. Patient's main complaint today is abdominal bloating. Ongoing x  years. States "she can look like she's pregnant". No certain food triggers. Overall patient has a BM daily.  Stool is formed and brown.   She does have occasional constipation x years  with hard stool which she controls with home remedies. Her weight is stable from last visit. Patient denies any fevers/ chills, unintentional weight loss, decreased appetite, abdominal pain, pelvic pain, chronic nausea /vomiting, black or bloody stools, heartburn, acid reflux, dysphagia, odynophagia. Denies chest pain, SOB. Colonoscopy reviewed from 2/2022 done for CRC completely normal aside from one colon polyp (benign) which was removed. Recall colonoscopy recommended in 10 years. EGD reviewed from 2/2022 for LUQ pain and was completely normal. Gastric biopsies were negative for H pylori. SBB negative for celiac disease. Review of Systems   Constitutional:  Negative for chills and fever. HENT:  Negative for ear pain and sore throat. Eyes:  Negative for pain and visual disturbance. Respiratory:  Negative for cough and shortness of breath. Cardiovascular:  Negative for chest pain and palpitations. Gastrointestinal:  Negative for abdominal pain and vomiting. Genitourinary:  Negative for dysuria and hematuria. Musculoskeletal:  Negative for arthralgias and back pain. Skin:  Negative for color change and rash. Neurological:  Negative for seizures and syncope. All other systems reviewed and are negative. Historical Information   Past Medical History:   Diagnosis Date    Allergic     Cervical radiculopathy 05/06/2019    Chronic low back pain without sciatica 06/30/2022    COVID-19 virus infection 11/15/2020    DDD (degenerative disc disease), cervical 10/07/2019    Diabetes mellitus ([address])     Generalized anxiety disorder 02/23/2018    Headache(784.0)     Hypertriglyceridemia 01/30/2018    Latent tuberculosis by blood test 01/25/2023    Positive QuantiFERON gold 1/23 and years earlier. Treated for 1 month and got side effects. 1/23, declines further treatment. Prefers to have a chest x-ray needed for her employment. Migraine without aura and without status migrainosus, not intractable 10/03/2019    Type 2 diabetes mellitus without complication, without long-term current use of insulin ([address]) 2021     Past Surgical History:   Procedure Laterality Date     SECTION      CHOLECYSTECTOMY      TOTAL ABDOMINAL HYSTERECTOMY      Due to fibroids.  Ovaries present     Social History   Social History     Substance and Sexual Activity   Alcohol Use No     Social History     Substance and Sexual Activity   Drug Use No     Social History     Tobacco Use   Smoking Status Never   Smokeless Tobacco Never     Family History   Problem Relation Age of Onset    Diabetes Mother     Hypertension Mother     Heart attack Mother     Stroke Father     No Known Problems Daughter     No Known Problems Maternal Grandmother     No Known Problems Maternal Grandfather     No Known Problems Paternal Grandmother     No Known Problems Paternal Grandfather     No Known Problems Maternal Aunt     No Known Problems Maternal Aunt     No Known Problems Maternal Aunt     No Known Problems Paternal Aunt     No Known Problems Paternal Aunt     No Known Problems Paternal Aunt     No Known Problems Paternal Aunt     No Known Problems Paternal Aunt        Meds/Allergies       Current Outpatient Medications:     acetaminophen (TYLENOL) 500 mg tablet    atorvastatin (LIPITOR) 10 mg tablet    clotrimazole-betamethasone (LOTRISONE) 1-0.05 % cream    fluticasone (FLONASE) 50 mcg/act nasal spray    FREESTYLE LITE test strip    hydrocortisone (ANUSOL-HC) 2.5 % rectal cream    metFORMIN (GLUCOPHAGE-XR) 500 mg 24 hr tablet    olopatadine (PATANOL) 0.1 % ophthalmic solution    triamcinolone (KENALOG) 0.147 MG/GM topical spray    ergocalciferol (ERGOCALCIFEROL) 1.25 MG (55865 UT) capsule    Allergies   Allergen Reactions    Bee Pollen Itching     Itchy, runny nose    Dust Mite Extract Itching     Itchy, runny nose    Pollen Extract Itching     Itchy, runny nose Objective     Wt Readings from Last 3 Encounters:   10/25/23 79.8 kg (176 lb)   05/11/23 79.8 kg (176 lb)   03/06/23 78.5 kg (173 lb)     Temp Readings from Last 3 Encounters:   10/25/23 (!) 96.3 °F (35.7 °C) (Tympanic)   05/11/23 98.4 °F (36.9 °C) (Temporal)   03/06/23 (!) 97 °F (36.1 °C) (Temporal)     BP Readings from Last 3 Encounters:   10/25/23 116/68   05/11/23 120/78   02/08/23 100/70     Pulse Readings from Last 3 Encounters:   05/11/23 91   02/08/23 94   06/30/22 83          PHYSICAL EXAM:      Physical Exam  Vitals reviewed. Constitutional:       General: She is not in acute distress. Appearance: She is not toxic-appearing. HENT:      Head: Normocephalic and atraumatic. Eyes:      Extraocular Movements: Extraocular movements intact. Conjunctiva/sclera: Conjunctivae normal.   Cardiovascular:      Rate and Rhythm: Normal rate and regular rhythm. Pulmonary:      Effort: Pulmonary effort is normal. No respiratory distress. Breath sounds: Normal breath sounds. Abdominal:      General: Bowel sounds are normal.      Palpations: Abdomen is soft. Tenderness: There is no abdominal tenderness. Musculoskeletal:         General: No swelling or tenderness. Cervical back: Normal range of motion and neck supple. Skin:     General: Skin is warm and dry. Coloration: Skin is not jaundiced. Neurological:      General: No focal deficit present. Mental Status: She is alert and oriented to person, place, and time. Mental status is at baseline. Psychiatric:         Mood and Affect: Mood normal.         Behavior: Behavior normal.         Thought Content:  Thought content normal.        Lab Results:     Lab Results   Component Value Date    WBC 6.43 09/08/2021    HGB 14.0 09/08/2021    HCT 39.3 09/08/2021    MCV 84 09/08/2021     09/08/2021       Lab Results   Component Value Date    SODIUM 136 09/08/2021    K 4.0 09/08/2021     09/08/2021    CO2 26 09/08/2021    AGAP 4 09/08/2021    BUN 5 09/08/2021    CREATININE 0.56 (L) 09/08/2021    GLUC 118 09/08/2021    GLUF 98 01/21/2021    CALCIUM 9.3 09/08/2021    AST 12 01/21/2021    ALT 31 01/21/2021    ALKPHOS 64 01/21/2021    TP 7.2 01/21/2021    TBILI 0.49 01/21/2021    EGFR 111 09/08/2021           Zev Abdullahi PA-C   Available on [facility]

## 2023-10-30 ENCOUNTER — HOSPITAL ENCOUNTER (EMERGENCY)
Facility: HOSPITAL | Age: 48
Discharge: HOME | End: 2023-10-30
Attending: EMERGENCY MEDICINE
Payer: COMMERCIAL

## 2023-10-30 VITALS
TEMPERATURE: 97.8 F | DIASTOLIC BLOOD PRESSURE: 76 MMHG | SYSTOLIC BLOOD PRESSURE: 119 MMHG | RESPIRATION RATE: 20 BRPM | HEART RATE: 72 BPM | OXYGEN SATURATION: 98 %

## 2023-10-30 DIAGNOSIS — S09.90XA INJURY OF HEAD, INITIAL ENCOUNTER: ICD-10-CM
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
## 2023-10-30 PROCEDURE — 99284 EMERGENCY DEPT VISIT MOD MDM: CPT | Performed by: EMERGENCY MEDICINE

## 2023-10-30 PROCEDURE — 99284 EMERGENCY DEPT VISIT MOD MDM: CPT

## 2023-10-30 PROCEDURE — 96372 THER/PROPH/DIAG INJ SC/IM: CPT

## 2023-10-30 RX ORDER — ACETAMINOPHEN 325 MG/1
975 TABLET ORAL ONCE
Status: COMPLETED | OUTPATIENT
Start: 2023-10-30 | End: 2023-10-30

## 2023-10-30 RX ORDER — KETOROLAC TROMETHAMINE 30 MG/ML
15 INJECTION, SOLUTION INTRAMUSCULAR; INTRAVENOUS ONCE
Status: COMPLETED | OUTPATIENT
Start: 2023-10-30 | End: 2023-10-30

## 2023-10-30 RX ADMIN — ACETAMINOPHEN 975 MG: 325 TABLET, FILM COATED ORAL at 21:02

## 2023-10-30 RX ADMIN — KETOROLAC TROMETHAMINE 15 MG: 30 INJECTION, SOLUTION INTRAMUSCULAR; INTRAVENOUS at 21:02

## 2023-10-31 NOTE — ED PROVIDER NOTES
History  Chief Complaint   Patient presents with    Fall     Pt was washing her feet, slipped on the wet floor and hit head on radiator. Pt denies blood thinners are this time. Pt has small lac on top of head and visible bruising to left side of face. 50-year-old female presents emergency department with her  denies past medical history, presents after bathing her feet when she lost her balance and fell forward striking her head against the boiler in their house. She denies being on blood thinners and denies a loss of consciousness. States he struck the left side of her head against the boiler, left face, and left thigh. She has been able to walk since the fall. Bleeding was controlled prior to arrival in emergency department. Patient states last tetanus vaccine was a few years ago. She denies any double vision, blurry vision, headache, runny nose, severe lower extremity pain. Prior to Admission Medications   Prescriptions Last Dose Informant Patient Reported? Taking?    FREESTYLE LITE test strip  Self No No   Sig: USE TO TEST BLOOD SUGAR AS DIRECTED   acetaminophen (TYLENOL) 500 mg tablet  Self Yes No   Sig: Take 500 mg by mouth as needed     atorvastatin (LIPITOR) 10 mg tablet  Self No No   Sig: Take 1 tablet (10 mg total) by mouth daily   clotrimazole-betamethasone (LOTRISONE) 1-0.05 % cream  Self No No   Sig: Apply topically 2 (two) times a day   ergocalciferol (ERGOCALCIFEROL) 1.25 MG (77037 UT) capsule   Yes No   Sig: Take 1 tablet by mouth once a week   fluticasone (FLONASE) 50 mcg/act nasal spray   No No   Si spray into each nostril daily   hydrocortisone (ANUSOL-HC) 2.5 % rectal cream  Self No No   Sig: Apply topically 2 (two) times a day   metFORMIN (GLUCOPHAGE-XR) 500 mg 24 hr tablet   No No   Sig: Take 1 tablet (500 mg total) by mouth daily with breakfast   olopatadine (PATANOL) 0.1 % ophthalmic solution   No No   Sig: Administer 1 drop to both eyes 2 (two) times a day triamcinolone (KENALOG) 0.147 MG/GM topical spray   No No   Sig: Apply topically 2 (two) times a day      Facility-Administered Medications: None       Past Medical History:   Diagnosis Date    Allergic     Cervical radiculopathy 2019    Chronic low back pain without sciatica 2022    COVID-19 virus infection 11/15/2020    DDD (degenerative disc disease), cervical 10/07/2019    Diabetes mellitus ([address])     Generalized anxiety disorder 2018    Headache(784.0)     Hypertriglyceridemia 2018    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 Treated for 1 month and got side effects. , declines further treatment. Prefers to have a chest x-ray needed for her employment. Migraine without aura and without status migrainosus, not intractable 10/03/2019    Type 2 diabetes mellitus without complication, without long-term current use of insulin ([address]) 2021       Past Surgical History:   Procedure Laterality Date     SECTION      CHOLECYSTECTOMY      TOTAL ABDOMINAL HYSTERECTOMY      Due to fibroids. Ovaries present       Family History   Problem Relation Age of Onset    Diabetes Mother     Hypertension Mother     Heart attack Mother     Stroke Father     No Known Problems Daughter     No Known Problems Maternal Grandmother     No Known Problems Maternal Grandfather     No Known Problems Paternal Grandmother     No Known Problems Paternal Grandfather     No Known Problems Maternal Aunt     No Known Problems Maternal Aunt     No Known Problems Maternal Aunt     No Known Problems Paternal Aunt     No Known Problems Paternal Aunt     No Known Problems Paternal Aunt     No Known Problems Paternal Aunt     No Known Problems Paternal Aunt      I have reviewed and agree with the history as documented.     E-Cigarette/Vaping    E-Cigarette Use Never User      E-Cigarette/Vaping Substances    Nicotine No     THC No     CBD No     Flavoring No Other No     Unknown No      Social History     Tobacco Use    Smoking status: Never    Smokeless tobacco: Never   Vaping Use    Vaping Use: Never used   Substance Use Topics    Alcohol use: No    Drug use: No        Review of Systems   Skin:  Positive for wound. All other systems reviewed and are negative. Physical Exam  ED Triage Vitals [10/30/23 2021]   Temperature Pulse Respirations Blood Pressure SpO2   97.8 °F (36.6 °C) 72 20 119/76 98 %      Temp src Heart Rate Source Patient Position - Orthostatic VS BP Location FiO2 (%)   -- Monitor Sitting Right arm --      Pain Score       No Pain             Orthostatic Vital Signs  Vitals:    10/30/23 2021   BP: 119/76   Pulse: 72   Patient Position - Orthostatic VS: Sitting       Physical Exam  Vitals and nursing note reviewed. Constitutional:       General: She is not in acute distress. Appearance: She is well-developed. HENT:      Head: Normocephalic. Comments: Raccoon eyes, vega signs, hemotympanum. No C-spine point tenderness. Able to range neck through full ranges of motion without discomfort. Eyes:      Conjunctiva/sclera: Conjunctivae normal.   Cardiovascular:      Rate and Rhythm: Normal rate and regular rhythm. Heart sounds: No murmur heard. Pulmonary:      Effort: Pulmonary effort is normal. No respiratory distress. Breath sounds: Normal breath sounds. Abdominal:      Palpations: Abdomen is soft. Tenderness: There is no abdominal tenderness. Musculoskeletal:         General: No swelling. Cervical back: Neck supple. Skin:     General: Skin is warm and dry. Capillary Refill: Capillary refill takes less than 2 seconds. Neurological:      Mental Status: She is alert.    Psychiatric:         Mood and Affect: Mood normal.         ED Medications  Medications   acetaminophen (TYLENOL) tablet 975 mg (975 mg Oral Given 10/30/23 2102)   ketorolac (TORADOL) injection 15 mg (15 mg Intramuscular Given 10/30/23 2102)       Diagnostic Studies  Results Reviewed       None                   No orders to display         Procedures  Procedures      ED Course                             SBIRT 22yo+      Flowsheet Row Most Recent Value   Initial Alcohol Screen: US AUDIT-C     1. How often do you have a drink containing alcohol? 0 Filed at: 10/30/2023 2021   2. How many drinks containing alcohol do you have on a typical day you are drinking? 0 Filed at: 10/30/2023 2021   3b. FEMALE Any Age, or MALE 65+: How often do you have 4 or more drinks on one occassion? 0 Filed at: 10/30/2023 2021   Audit-C Score 0 Filed at: 10/30/2023 2021   EMANUEL: How many times in the past year have you. .. Used an illegal drug or used a prescription medication for non-medical reasons? Never Filed at: 10/30/2023 2021                  Medical Decision Making  58-year-old female presents emergency department with a head injury,, left orbital swelling    DDx: Concussion, headache, low concern for orbital fracture    Plan: Monitor, pain control, discharge home    Signs and symptoms are not concerning for orbital fracture given lack of restriction of extraocular eye movement. No signs of proptosis. Bleeding was controlled no need for laceration repair. Tetanus is otherwise up-to-date per family. Patient will receive analgesic pain medication to discharge home. Patient given strict return precautions for intracranial hemorrhage, concussion, orbital wall fracture. Risk  OTC drugs. Prescription drug management. Disposition  Final diagnoses:   Fall, initial encounter   Injury of head, initial encounter     Time reflects when diagnosis was documented in both MDM as applicable and the Disposition within this note       Time User Action Codes Description Comment    10/30/2023  9:04 PM Ran Colindres Add [W19. WSMY] Fall, initial encounter     10/30/2023  9:04 PM Ran Colindres Add [Y33.54QM] Injury of head, initial encounter           ED Disposition       ED Disposition   Discharge    Condition   Stable    Date/Time   Mon Oct 30, 2023 2104    Comment   Lucas Singh discharge to home/self care.                    Follow-up Information       Follow up With Specialties Details Why Contact Info Additional Information    Physical Therapy at [facility] Physical [facility]   [address] [facility] [address] [address]  [phone] Physical Therapy at [facility], [address] [facility] [address] [place], [place], [address] [place] [address]            Discharge Medication List as of 10/30/2023  9:10 PM        CONTINUE these medications which have NOT CHANGED    Details   acetaminophen (TYLENOL) 500 mg tablet Take 500 mg by mouth as needed  , Historical Med      atorvastatin (LIPITOR) 10 mg tablet Take 1 tablet (10 mg total) by mouth daily, Starting Wed 2/8/2023, Normal      clotrimazole-betamethasone (LOTRISONE) 1-0.05 % cream Apply topically 2 (two) times a day, Starting Thu 2/11/2021, Normal      ergocalciferol (ERGOCALCIFEROL) 1.25 MG (58215 UT) capsule Take 1 tablet by mouth once a week, Historical Med      fluticasone (FLONASE) 50 mcg/act nasal spray 1 spray into each nostril daily, Starting Thu 5/11/2023, Normal      FREESTYLE LITE test strip USE TO TEST BLOOD SUGAR AS DIRECTED, Normal      hydrocortisone (ANUSOL-HC) 2.5 % rectal cream Apply topically 2 (two) times a day, Starting Tue 2/8/2022, Normal      metFORMIN (GLUCOPHAGE-XR) 500 mg 24 hr tablet Take 1 tablet (500 mg total) by mouth daily with breakfast, Starting Thu 5/11/2023, Until Sun 5/5/2024, Normal      olopatadine (PATANOL) 0.1 % ophthalmic solution Administer 1 drop to both eyes 2 (two) times a day, Starting Thu 5/11/2023, Normal      triamcinolone (KENALOG) 0.147 MG/GM topical spray Apply topically 2 (two) times a day, Starting Thu 5/11/2023, Normal               PDMP Review       None             ED Provider  Attending physically available and evaluated Priscilla Griffin. I managed the patient along with the ED Attending.     Electronically Signed by           Durga Staley DO  10/31/23 0028

## 2023-10-31 NOTE — DISCHARGE INSTRUCTIONS
Nohemi Riding was seen and evaluated today in the emergency department over your concern of head injury. The workup that we performed showed head injury, scalp abrasion. Please return to the emergency department if you experience LOC, nausea, double vision or any other signs and symptoms that may be concerning to you. Please follow-up with your primary care doctor within 1 day. All questions were answered prior to discharge. Thank you for choosing [facility] for your care.     F/u with pcp

## 2023-10-31 NOTE — ED ATTENDING ATTESTATION
10/30/2023  I, Sohail Velásquez MD, saw and evaluated the patient. I have discussed the patient with the resident/non-physician practitioner and agree with the resident's/non-physician practitioner's findings, Plan of Care, and MDM as documented in the resident's/non-physician practitioner's note, except where noted. All available labs and Radiology studies were reviewed. I was present for key portions of any procedure(s) performed by the resident/non-physician practitioner and I was immediately available to provide assistance. At this point I agree with the current assessment done in the Emergency Department. I have conducted an independent evaluation of this patient a history and physical is as follows:    ED Course         Critical Care Time  Procedures    50 yo female while washing feet, lost balance and hit head on radiator and face. Pt also with thigh pain. Pt with abrasion to head, no active bleeding. Pt with facial swelling. Pt with no n/v. No blurry vision. No blood thinners. Vss, afebrile, lungs cta, rrr, abdomen soft nontender, left facial swelling, eccymosis, no spinal tenderness, no neuro deficits, abrasion to scalp, left leg mild tenderness, no eccymosis, nvi. Reassurance. none

## 2023-11-01 ENCOUNTER — HOSPITAL ENCOUNTER (OUTPATIENT)
Dept: RADIOLOGY | Facility: HOSPITAL | Age: 48
Discharge: HOME | End: 2023-11-01
Payer: COMMERCIAL

## 2023-11-01 ENCOUNTER — LAB (OUTPATIENT)
Dept: LAB | Facility: CLINIC | Age: 48
End: 2023-11-01
Payer: COMMERCIAL

## 2023-11-01 DIAGNOSIS — K59.00 CONSTIPATION, UNSPECIFIED CONSTIPATION TYPE: ICD-10-CM

## 2023-11-01 DIAGNOSIS — R53.83 FATIGUE, UNSPECIFIED TYPE: ICD-10-CM

## 2023-11-01 DIAGNOSIS — E11.9 TYPE 2 DIABETES MELLITUS WITHOUT COMPLICATION, WITHOUT LONG-TERM CURRENT USE OF INSULIN (HCC): ICD-10-CM

## 2023-11-01 DIAGNOSIS — R14.0 ABDOMINAL BLOATING: ICD-10-CM

## 2023-11-01 DIAGNOSIS — E55.9 VITAMIN D DEFICIENCY: ICD-10-CM

## 2023-11-01 LAB
ALBUMIN SERPL BCP-MCNC: 4.4 G/DL (ref 3.5–5)
ALP SERPL-CCNC: 57 U/L (ref 34–104)
ALT SERPL W P-5'-P-CCNC: 23 U/L (ref 7–52)
ANION GAP SERPL CALCULATED.3IONS-SCNC: 8 MMOL/L
AST SERPL W P-5'-P-CCNC: 17 U/L (ref 13–39)
BASOPHILS # BLD AUTO: 0.05 THOUSANDS/ÂΜL (ref 0–0.1)
BASOPHILS NFR BLD AUTO: 1 % (ref 0–1)
BILIRUB SERPL-MCNC: 0.85 MG/DL (ref 0.2–1)
BUN SERPL-MCNC: 12 MG/DL (ref 5–25)
CALCIUM SERPL-MCNC: 9.7 MG/DL (ref 8.4–10.2)
CHLORIDE SERPL-SCNC: 100 MMOL/L (ref 96–108)
CHOLEST SERPL-MCNC: 233 MG/DL
CO2 SERPL-SCNC: 31 MMOL/L (ref 21–32)
CREAT SERPL-MCNC: 0.66 MG/DL (ref 0.6–1.3)
CREAT UR-MCNC: 159.6 MG/DL
EOSINOPHIL # BLD AUTO: 0.15 THOUSAND/ÂΜL (ref 0–0.61)
EOSINOPHIL NFR BLD AUTO: 2 % (ref 0–6)
ERYTHROCYTE [DISTWIDTH] IN BLOOD BY AUTOMATED COUNT: 12.6 % (ref 11.6–15.1)
GFR SERPL CREATININE-BSD FRML MDRD: 104 ML/MIN/1.73SQ M
GLUCOSE P FAST SERPL-MCNC: 90 MG/DL (ref 65–99)
HCT VFR BLD AUTO: 43.9 % (ref 34.8–46.1)
HDLC SERPL-MCNC: 49 MG/DL
HGB BLD-MCNC: 15.1 G/DL (ref 11.5–15.4)
IMM GRANULOCYTES # BLD AUTO: 0.02 THOUSAND/UL (ref 0–0.2)
IMM GRANULOCYTES NFR BLD AUTO: 0 % (ref 0–2)
LDLC SERPL CALC-MCNC: 128 MG/DL (ref 0–100)
LYMPHOCYTES # BLD AUTO: 2.39 THOUSANDS/ÂΜL (ref 0.6–4.47)
LYMPHOCYTES NFR BLD AUTO: 31 % (ref 14–44)
MCH RBC QN AUTO: 30.1 PG (ref 26.8–34.3)
MCHC RBC AUTO-ENTMCNC: 34.4 G/DL (ref 31.4–37.4)
MCV RBC AUTO: 88 FL (ref 82–98)
MICROALBUMIN UR-MCNC: <7 MG/L
MICROALBUMIN/CREAT 24H UR: <4 MG/G CREATININE (ref 0–30)
MONOCYTES # BLD AUTO: 0.53 THOUSAND/ÂΜL (ref 0.17–1.22)
MONOCYTES NFR BLD AUTO: 7 % (ref 4–12)
NEUTROPHILS # BLD AUTO: 4.47 THOUSANDS/ÂΜL (ref 1.85–7.62)
NEUTS SEG NFR BLD AUTO: 59 % (ref 43–75)
NONHDLC SERPL-MCNC: 184 MG/DL
NRBC BLD AUTO-RTO: 0 /100 WBCS
PLATELET # BLD AUTO: 275 THOUSANDS/UL (ref 149–390)
PMV BLD AUTO: 11.3 FL (ref 8.9–12.7)
POTASSIUM SERPL-SCNC: 4.1 MMOL/L (ref 3.5–5.3)
PROT SERPL-MCNC: 7.6 G/DL (ref 6.4–8.4)
RBC # BLD AUTO: 5.02 MILLION/UL (ref 3.81–5.12)
SODIUM SERPL-SCNC: 139 MMOL/L (ref 135–147)
TRIGL SERPL-MCNC: 282 MG/DL
TSH SERPL DL<=0.05 MIU/L-ACNC: 1.75 UIU/ML (ref 0.45–4.5)
WBC # BLD AUTO: 7.61 THOUSAND/UL (ref 4.31–10.16)

## 2023-11-01 PROCEDURE — 74018 RADEX ABDOMEN 1 VIEW: CPT

## 2023-11-01 PROCEDURE — 84443 ASSAY THYROID STIM HORMONE: CPT

## 2023-11-01 PROCEDURE — 82652 VIT D 1 25-DIHYDROXY: CPT

## 2023-11-01 PROCEDURE — 82043 UR ALBUMIN QUANTITATIVE: CPT

## 2023-11-01 PROCEDURE — 82570 ASSAY OF URINE CREATININE: CPT

## 2023-11-01 PROCEDURE — 80061 LIPID PANEL: CPT

## 2023-11-01 PROCEDURE — 36415 COLL VENOUS BLD VENIPUNCTURE: CPT

## 2023-11-01 PROCEDURE — 76700 US EXAM ABDOM COMPLETE: CPT

## 2023-11-01 PROCEDURE — 80053 COMPREHEN METABOLIC PANEL: CPT

## 2023-11-01 PROCEDURE — 85025 COMPLETE CBC W/AUTO DIFF WBC: CPT

## 2023-11-01 NOTE — ASSESSMENT & PLAN NOTE
New evaluation of left shoulder and upper arm pain with weakness  2 months ago suffered injury with abrupt twisting of her shoulder. Complains of pain in the posterior shoulder, in axilla and medial upper arm constantly. Initially was having difficulty lifting the arm and moving it. Noted some weakness in the upper arm, not with . No gait/balance issues, no BBI. No issues with fine motor skills. Having some left neck pain since a fall the other day. Had left shoulder injection in [facility], but pain returned. She has not seen Orthopedics here. Last seen by Dr. Maricarmen Elaine in 2021 for cervical radiculopathy. Exam: no midline neck TTP, left shoulder pain with ROM, Left triceps/biceps 4/5, otherwise intact, LT intact, 2+ DTRs, no Carrasquillo's, no drift or dysmetria    Plan:  Discussed current symptoms with patient. They appear to be more consistent with shoulder pathology rather than cervical spine given the mechanism of injury, relief with shoulder injection and pain on ROM. Referral placed to Orthopedics. She had an XR in [facility] that she states she will bring. She is concerned about her cervical spine and wishes to have this re-imaged. MRI ordered. She is noting more forgetfulness and word finding issues. Has not discussed this with her PCP yet. Neurology referral placed. Follow up once imaging completed to see Dr. Maricarmen Elaine only per patient request.   Call sooner with any questions or concerns.

## 2023-11-02 ENCOUNTER — OFFICE VISIT (OUTPATIENT)
Dept: NEUROSURGERY | Facility: CLINIC | Age: 48
End: 2023-11-02
Payer: COMMERCIAL

## 2023-11-02 VITALS
WEIGHT: 175 LBS | OXYGEN SATURATION: 99 % | HEART RATE: 72 BPM | DIASTOLIC BLOOD PRESSURE: 62 MMHG | BODY MASS INDEX: 28.12 KG/M2 | RESPIRATION RATE: 16 BRPM | TEMPERATURE: 98 F | HEIGHT: 66 IN | SYSTOLIC BLOOD PRESSURE: 102 MMHG

## 2023-11-02 DIAGNOSIS — M25.512 ACUTE PAIN OF LEFT SHOULDER: ICD-10-CM

## 2023-11-02 DIAGNOSIS — M54.12 CERVICAL RADICULOPATHY: Primary | ICD-10-CM

## 2023-11-02 DIAGNOSIS — R68.89 FORGETFULNESS: ICD-10-CM

## 2023-11-02 PROCEDURE — 99214 OFFICE O/P EST MOD 30 MIN: CPT | Performed by: PHYSICIAN ASSISTANT

## 2023-11-02 NOTE — PROGRESS NOTES
Neurosurgery Office Note  Jannette Thacker 51 y.o. female MRN: [id number]      Assessment/Plan     Cervical radiculopathy  New evaluation of left shoulder and upper arm pain with weakness  2 months ago suffered injury with abrupt twisting of her shoulder. Complains of pain in the posterior shoulder, in axilla and medial upper arm constantly. Initially was having difficulty lifting the arm and moving it. Noted some weakness in the upper arm, not with . No gait/balance issues, no BBI. No issues with fine motor skills. Having some left neck pain since a fall the other day. Had left shoulder injection in [facility], but pain returned. She has not seen Orthopedics here. Last seen by Dr. Magy Melgar in 2021 for cervical radiculopathy. Exam: no midline neck TTP, left shoulder pain with ROM, Left triceps/biceps 4/5, otherwise intact, LT intact, 2+ DTRs, no Carrasquillo's, no drift or dysmetria    Plan:  Discussed current symptoms with patient. They appear to be more consistent with shoulder pathology rather than cervical spine given the mechanism of injury, relief with shoulder injection and pain on ROM. Referral placed to Orthopedics. She had an XR in [facility] that she states she will bring. She is concerned about her cervical spine and wishes to have this re-imaged. MRI ordered. She is noting more forgetfulness and word finding issues. Has not discussed this with her PCP yet. Neurology referral placed. Follow up once imaging completed to see Dr. Magy Melgar only per patient request.   Call sooner with any questions or concerns. Diagnoses and all orders for this visit:    Cervical radiculopathy  -     MRI cervical spine wo contrast; Future    Acute pain of left shoulder  -     Ambulatory referral to Orthopedic Surgery; Future    Forgetfulness  -     Ambulatory referral to Neurology;  Future          I have spent a total time of 40 minutes on 11/02/23 in caring for this patient including Diagnostic results, Instructions for management, Patient and family education, Impressions, Counseling / Coordination of care, Documenting in the medical record, Reviewing / ordering tests, medicine, procedures  , and Obtaining or reviewing history  . CHIEF COMPLAINT    Chief Complaint   Patient presents with    Follow-up     New work up, cervical radiculopathy       HISTORY    History of Present Illness     51y.o. year old female     44-year-old female seen for evaluation of left shoulder and upper extremity pain. States this started about 2 months ago when she got her arm stuck at work and then she suffered an abrupt upright twisting motion tossing her hand back and twisting her shoulder. Pain was excruciating at that time. She describes the pain as being in the back of her shoulder and under her axilla into the medial upper arm. She has been having trouble lifting and moving the arm even just rolling over in bed is painful. She does feel that there is some weakness in the arm but not with her . She went to [facility] about a month ago, and while she was there saw orthopedics and was given an injection in her shoulder which brought her 10/10 pain down to a 3/10. The pain has unfortunately started to come back. States that she was told she had a frozen shoulder. She always has some left-sided neck pain with pain intermittently radiating down the medial arm to the fourth and fifth fingers. This has not worsened since her last visit. Right-hand-dominant. Denies issues with dropping items or fine motor skills. No issues with balance or bowel/bladder incontinence. Unfortunately she fell in the shower the other day and has a black eye. Since this, she feels like the neck pain is a bit worse. Last seen 12/2021 by Dr. Natasha Pruitt for intermittent left-sided cervical radiculopathy with plans for clinical follow-up, states she did not follow-up as she was doing well.         See Discussion    REVIEW OF SYSTEMS    Review of Systems   HENT:  Negative for trouble swallowing. Eyes:  Negative for visual disturbance. Respiratory:  Positive for chest tightness. Negative for shortness of breath. Gastrointestinal: Negative. Genitourinary: Negative. Musculoskeletal:  Positive for back pain, joint swelling (left arm) and neck pain. Negative for gait problem. Pain in left shoulder and arm began with a twisting/pulling injury at work about 2 months ago    Yuliana Woodys in the shower 2 days ago, now is having left sided neck and head pain as well   Skin:  Positive for wound. Pallor: bruise on face and leg, wound on head. Allergic/Immunologic: Positive for environmental allergies. Neurological:  Positive for weakness (left arm) and headaches. Negative for seizures and numbness. Psychiatric/Behavioral:  Positive for sleep disturbance. Negative for confusion and decreased concentration. ROS obtained by MA. Reviewed. See HPI.      Meds/Allergies     Current Outpatient Medications   Medication Sig Dispense Refill    acetaminophen (TYLENOL) 500 mg tablet Take 500 mg by mouth as needed        atorvastatin (LIPITOR) 10 mg tablet Take 1 tablet (10 mg total) by mouth daily 90 tablet 3    fluticasone (FLONASE) 50 mcg/act nasal spray 1 spray into each nostril daily (Patient taking differently: 1 spray into each nostril as needed) 48 g 3    metFORMIN (GLUCOPHAGE-XR) 500 mg 24 hr tablet Take 1 tablet (500 mg total) by mouth daily with breakfast 180 tablet 1    clotrimazole-betamethasone (LOTRISONE) 1-0.05 % cream Apply topically 2 (two) times a day (Patient not taking: Reported on 11/2/2023) 45 g 3    ergocalciferol (ERGOCALCIFEROL) 1.25 MG (41669 UT) capsule Take 1 tablet by mouth once a week (Patient not taking: Reported on 11/2/2023)      FREESTYLE LITE test strip USE TO TEST BLOOD SUGAR AS DIRECTED 100 each 5    hydrocortisone (ANUSOL-HC) 2.5 % rectal cream Apply topically 2 (two) times a day (Patient not taking: Reported on 11/2/2023) 28 g 0    olopatadine (PATANOL) 0.1 % ophthalmic solution Administer 1 drop to both eyes 2 (two) times a day (Patient not taking: Reported on 2023) 5 mL 5    triamcinolone (KENALOG) 0.147 MG/GM topical spray Apply topically 2 (two) times a day (Patient not taking: Reported on 2023) 63 g 5     No current facility-administered medications for this visit. Allergies   Allergen Reactions    Bee Pollen Itching     Itchy, runny nose    Dust Mite Extract Itching     Itchy, runny nose    Pollen Extract Itching     Itchy, runny nose       PAST HISTORY    Past Medical History:   Diagnosis Date    Allergic     Cervical radiculopathy 2019    Chronic low back pain without sciatica 2022    COVID-19 virus infection 11/15/2020    DDD (degenerative disc disease), cervical 10/07/2019    Diabetes mellitus ([address])     Generalized anxiety disorder 2018    Headache(784.0)     Hypertriglyceridemia 2018    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 Treated for 1 month and got side effects. , declines further treatment. Prefers to have a chest x-ray needed for her employment. Migraine without aura and without status migrainosus, not intractable 10/03/2019    Type 2 diabetes mellitus without complication, without long-term current use of insulin ([address]) 2021       Past Surgical History:   Procedure Laterality Date     SECTION      CHOLECYSTECTOMY      TOTAL ABDOMINAL HYSTERECTOMY      Due to fibroids.  Ovaries present       Social History     Tobacco Use    Smoking status: Never    Smokeless tobacco: Never   Vaping Use    Vaping Use: Never used   Substance Use Topics    Alcohol use: No    Drug use: No       Family History   Problem Relation Age of Onset    Diabetes Mother     Hypertension Mother     Heart attack Mother     Stroke Father     No Known Problems Daughter     No Known Problems Maternal Grandmother     No Known Problems Maternal Grandfather No Known Problems Paternal Grandmother     No Known Problems Paternal Grandfather     No Known Problems Maternal Aunt     No Known Problems Maternal Aunt     No Known Problems Maternal Aunt     No Known Problems Paternal Aunt     No Known Problems Paternal Aunt     No Known Problems Paternal Aunt     No Known Problems Paternal Aunt     No Known Problems Paternal Aunt          Above history personally reviewed. EXAM    Vitals:Blood pressure 102/62, pulse 72, temperature 98 °F (36.7 °C), temperature source Temporal, resp. rate 16, height 5' 6" (1.676 m), weight 79.4 kg (175 lb), SpO2 99 %. ,Body mass index is 28.25 kg/m². Physical Exam  Vitals reviewed. Constitutional:       General: She is awake. Appearance: Normal appearance. HENT:      Head: Normocephalic and atraumatic. Eyes:      Conjunctiva/sclera: Conjunctivae normal.   Neck:      Comments: No midline spinal TTP  Cardiovascular:      Rate and Rhythm: Normal rate. Pulmonary:      Effort: Pulmonary effort is normal.   Musculoskeletal:      Comments: Left shoulder pain with ROM   Skin:     General: Skin is warm and dry. Neurological:      Mental Status: She is alert and oriented to person, place, and time. Coordination: Finger-Nose-Finger Test normal.      Gait: Gait is intact. Deep Tendon Reflexes:      Reflex Scores:       Bicep reflexes are 2+ on the right side and 2+ on the left side. Brachioradialis reflexes are 2+ on the right side and 2+ on the left side. Patellar reflexes are 2+ on the right side and 2+ on the left side. Achilles reflexes are 2+ on the right side and 2+ on the left side. Psychiatric:         Attention and Perception: Attention and perception normal.         Mood and Affect: Mood and affect normal.         Speech: Speech normal.         Behavior: Behavior normal. Behavior is cooperative. Thought Content:  Thought content normal.         Cognition and Memory: Cognition and memory normal.         Judgment: Judgment normal.         Neurologic Exam     Mental Status   Oriented to person, place, and time. Follows 2 step commands. Attention: normal. Concentration: normal.   Speech: speech is normal   Level of consciousness: alert  Knowledge: good. Normal comprehension. Cranial Nerves     CN XI   Right trapezius strength: normal  Left trapezius strength: normal    Motor Exam   Muscle bulk: normal  Overall muscle tone: normal  Right arm pronator drift: absent  Left arm pronator drift: absent  RUE - 5/5  LUE - biceps/tricept 4+/5, otherwise 5/5  BLE - 5/5     Sensory Exam   Right arm light touch: normal  Left arm light touch: normal  Right leg light touch: normal  Left leg light touch: normal    Gait, Coordination, and Reflexes     Gait  Gait: normal    Coordination   Finger to nose coordination: normal    Tremor   Resting tremor: absent  Intention tremor: absent  Action tremor: absent    Reflexes   Right brachioradialis: 2+  Left brachioradialis: 2+  Right biceps: 2+  Left biceps: 2+  Right patellar: 2+  Left patellar: 2+  Right achilles: 2+  Left achilles: 2+  Right Carrasquillo: absent  Left Carrasquillo: absent  Right ankle clonus: absent  Left ankle clonus: absent  Intact rapid finger movements         MEDICAL DECISION MAKING    Imaging Studies:     No results found. I have personally reviewed pertinent reports.    and I have personally reviewed pertinent films in PACS

## 2023-11-03 LAB — 1,25(OH)2D3 SERPL-MCNC: 54.5 PG/ML (ref 24.8–81.5)

## 2023-11-07 ENCOUNTER — APPOINTMENT (OUTPATIENT)
Dept: RADIOLOGY | Facility: OTHER | Age: 48
End: 2023-11-07
Payer: COMMERCIAL

## 2023-11-07 ENCOUNTER — OFFICE VISIT (OUTPATIENT)
Dept: OBGYN CLINIC | Facility: OTHER | Age: 48
End: 2023-11-07
Payer: COMMERCIAL

## 2023-11-07 VITALS
WEIGHT: 175 LBS | HEIGHT: 66 IN | DIASTOLIC BLOOD PRESSURE: 62 MMHG | HEART RATE: 71 BPM | BODY MASS INDEX: 28.12 KG/M2 | SYSTOLIC BLOOD PRESSURE: 92 MMHG

## 2023-11-07 DIAGNOSIS — M25.512 ACUTE PAIN OF LEFT SHOULDER: ICD-10-CM

## 2023-11-07 DIAGNOSIS — R29.898 SHOULDER WEAKNESS: Primary | ICD-10-CM

## 2023-11-07 PROCEDURE — 73030 X-RAY EXAM OF SHOULDER: CPT

## 2023-11-07 PROCEDURE — 99203 OFFICE O/P NEW LOW 30 MIN: CPT | Performed by: ORTHOPAEDIC SURGERY

## 2023-11-07 NOTE — PROGRESS NOTES
Chief Complaint: Left shoulder pain    HPI:    Christel Pike is a 49year old Female who presents today for evaluation of left shoulder pain      Description of symptoms: Patient has acute on chronic left shoulder and arm pain. She reportedly had acute worsening of symptoms when trying to pull a cart at work. This resulted in a sudden twisting motion of her left upper extremity. This incident happened nearly 2 months ago. Subsequently, she has experienced moderate to severe intensity pain radiating from her shoulder distally to her elbow as well as proximally to her neck. She does report that symptoms are made worse with left shoulder motion compared to neck motion at this time. She has been seen by neurosurgery on 11/2/2023 and is currently awaiting a cervical spine MRI. A referral to orthopedic was made for evaluation with regards to the left shoulder. Patient does mention that following her recent injury, she traveled to [facility] and received a left shoulder injection, possibly steroid and this did not provide her much relief. I have personally reviewed pertinent films in PACS and my interpretation is plain radiograph of the left shoulder performed today does not reveal any acute osseous injury or significant degenerative changes. .    Patient Active Problem List   Diagnosis    Hypertriglyceridemia    Vitamin D deficiency    Generalized anxiety disorder    Cervical radiculopathy    Migraine without aura and without status migrainosus, not intractable    DDD (degenerative disc disease), cervical    Spondylosis of cervical spine without myelopathy    Chronic pain of left knee    Type 2 diabetes mellitus without complication, without long-term current use of insulin (HCC)    Nasal itching    Chronic low back pain without sciatica    Latent tuberculosis by blood test    Seasonal allergies        Current Outpatient Medications on File Prior to Visit   Medication Sig Dispense Refill    acetaminophen (TYLENOL) 500 mg tablet Take 500 mg by mouth as needed        atorvastatin (LIPITOR) 10 mg tablet Take 1 tablet (10 mg total) by mouth daily 90 tablet 3    fluticasone (FLONASE) 50 mcg/act nasal spray 1 spray into each nostril daily (Patient taking differently: 1 spray into each nostril as needed) 48 g 3    FREESTYLE LITE test strip USE TO TEST BLOOD SUGAR AS DIRECTED 100 each 5    metFORMIN (GLUCOPHAGE-XR) 500 mg 24 hr tablet Take 1 tablet (500 mg total) by mouth daily with breakfast 180 tablet 1    clotrimazole-betamethasone (LOTRISONE) 1-0.05 % cream Apply topically 2 (two) times a day (Patient not taking: Reported on 11/2/2023) 45 g 3    ergocalciferol (ERGOCALCIFEROL) 1.25 MG (13523 UT) capsule Take 1 tablet by mouth once a week (Patient not taking: Reported on 11/2/2023)      hydrocortisone (ANUSOL-HC) 2.5 % rectal cream Apply topically 2 (two) times a day (Patient not taking: Reported on 11/2/2023) 28 g 0    olopatadine (PATANOL) 0.1 % ophthalmic solution Administer 1 drop to both eyes 2 (two) times a day (Patient not taking: Reported on 11/2/2023) 5 mL 5    triamcinolone (KENALOG) 0.147 MG/GM topical spray Apply topically 2 (two) times a day (Patient not taking: Reported on 11/2/2023) 63 g 5     No current facility-administered medications on file prior to visit.         Allergies   Allergen Reactions    Bee Pollen Itching     Itchy, runny nose    Dust Mite Extract Itching     Itchy, runny nose    Pollen Extract Itching     Itchy, runny nose        Tobacco Use: Low Risk  (11/7/2023)    Patient History     Smoking Tobacco Use: Never     Smokeless Tobacco Use: Never     Passive Exposure: Not on file        Social Determinants of Health     Tobacco Use: Low Risk  (11/7/2023)    Patient History     Smoking Tobacco Use: Never     Smokeless Tobacco Use: Never     Passive Exposure: Not on file   Alcohol Use: Not on file   Financial Resource Strain: Not on file   Food Insecurity: Not on file   Transportation Needs: Not on file Physical Activity: Not on file   Stress: Not on file   Social Connections: Not on file   Intimate Partner Violence: Not on file   Depression: Not at risk (1/25/2023)    PHQ-2     PHQ-2 Score: 0   Housing Stability: Not on file   Utilities: Not on file               Review of Systems     Body mass index is 28.25 kg/m². Physical Exam  Vitals and nursing note reviewed. HENT:      Head: Atraumatic. Eyes:      Conjunctiva/sclera: Conjunctivae normal.   Cardiovascular:      Rate and Rhythm: Normal rate. Pulses: Normal pulses. Pulmonary:      Effort: Pulmonary effort is normal. No respiratory distress. Neurological:      Mental Status: She is alert and oriented to person, place, and time. Psychiatric:         Mood and Affect: Mood normal.         Behavior: Behavior normal.          Ortho Exam:    Body part: left shoulder    Inspection: No clinically visible deformity    Palpation: Tender palpation in the subacromial space at the supraspinatus tendon    Range of motion: Forward flexion is 130 degrees, abduction is 130 degrees, external rotation in adduction is 60 degrees and internal rotation is at the lower lumbar level. Special Tests: Positive Meyer and positive Neer's. Positive empty can test.  Rotator cuff testing is at least 4/5 but limited due to significant pain. Further testing was deferred due to patient's symptom exacerbation    Distal Neurovascular Status: Intact, Yes        Assessment:     Diagnosis ICD-10-CM Associated Orders   1. Shoulder weakness  R29.898 MRI shoulder left wo contrast      2. Acute pain of left shoulder  M25.512 Ambulatory referral to Orthopedic Surgery     XR shoulder 2+ vw left     MRI shoulder left wo contrast           Plan:    I explained my current clinical findings and reviewed the radiological findings with the patient today. I suspect that her symptoms are likely secondary to a left shoulder rotator cuff syndrome.   She does report sudden exacerbation of symptoms due to her recent injury and no improvement despite [place]. Hence, I will request an MRI of the left shoulder for further evaluation and to exclude any underlying rotator cuff disorder/tear. In the interim, she is encouraged to do active range of motion exercises of the left shoulder. Follow-Up:    After left shoulder MRI        Portions of the record may have been created with voice recognition software. Occasional wrong word or "sound alike" substitutions may have occurred due to the inherent limitations of voice recognition software. Please review the chart carefully and recognize, using context, where substitutions/typographical errors may have occurred.

## 2023-11-17 ENCOUNTER — TELEPHONE (OUTPATIENT)
Dept: NEUROSURGERY | Facility: CLINIC | Age: 48
End: 2023-11-17

## 2023-11-23 ENCOUNTER — HOSPITAL ENCOUNTER (OUTPATIENT)
Dept: RADIOLOGY | Facility: HOSPITAL | Age: 48
Discharge: HOME | End: 2023-11-23
Attending: ORTHOPAEDIC SURGERY
Payer: COMMERCIAL

## 2023-11-23 DIAGNOSIS — R29.898 SHOULDER WEAKNESS: ICD-10-CM

## 2023-11-23 DIAGNOSIS — M25.512 ACUTE PAIN OF LEFT SHOULDER: ICD-10-CM

## 2023-11-23 PROCEDURE — G1004 CDSM NDSC: HCPCS

## 2023-11-23 PROCEDURE — 73221 MRI JOINT UPR EXTREM W/O DYE: CPT

## 2023-11-26 ENCOUNTER — HOSPITAL ENCOUNTER (OUTPATIENT)
Dept: RADIOLOGY | Facility: HOSPITAL | Age: 48
Discharge: HOME | End: 2023-11-26
Payer: COMMERCIAL

## 2023-11-26 DIAGNOSIS — M54.12 CERVICAL RADICULOPATHY: ICD-10-CM

## 2023-11-26 PROCEDURE — G1004 CDSM NDSC: HCPCS

## 2023-11-26 PROCEDURE — 72141 MRI NECK SPINE W/O DYE: CPT

## 2023-11-28 ENCOUNTER — TELEPHONE (OUTPATIENT)
Dept: NEUROSURGERY | Facility: CLINIC | Age: 48
End: 2023-11-28

## 2023-11-29 ENCOUNTER — OFFICE VISIT (OUTPATIENT)
Dept: NEUROSURGERY | Facility: CLINIC | Age: 48
End: 2023-11-29
Payer: COMMERCIAL

## 2023-11-29 ENCOUNTER — OFFICE VISIT (OUTPATIENT)
Dept: OBGYN CLINIC | Facility: OTHER | Age: 48
End: 2023-11-29
Payer: COMMERCIAL

## 2023-11-29 VITALS
WEIGHT: 177 LBS | SYSTOLIC BLOOD PRESSURE: 119 MMHG | HEART RATE: 80 BPM | DIASTOLIC BLOOD PRESSURE: 75 MMHG | BODY MASS INDEX: 28.57 KG/M2

## 2023-11-29 VITALS
RESPIRATION RATE: 14 BRPM | TEMPERATURE: 98.1 F | HEART RATE: 72 BPM | HEIGHT: 66 IN | SYSTOLIC BLOOD PRESSURE: 102 MMHG | BODY MASS INDEX: 28.45 KG/M2 | WEIGHT: 177 LBS | DIASTOLIC BLOOD PRESSURE: 78 MMHG | OXYGEN SATURATION: 99 %

## 2023-11-29 DIAGNOSIS — M54.12 CERVICAL RADICULOPATHY: Primary | ICD-10-CM

## 2023-11-29 DIAGNOSIS — M47.812 SPONDYLOSIS OF CERVICAL SPINE WITHOUT MYELOPATHY: Primary | ICD-10-CM

## 2023-11-29 DIAGNOSIS — M67.814 TENDINOSIS OF LEFT ROTATOR CUFF: ICD-10-CM

## 2023-11-29 DIAGNOSIS — M47.812 SPONDYLOSIS OF CERVICAL SPINE WITHOUT MYELOPATHY: ICD-10-CM

## 2023-11-29 DIAGNOSIS — M54.12 CERVICAL RADICULOPATHY: ICD-10-CM

## 2023-11-29 PROCEDURE — 99214 OFFICE O/P EST MOD 30 MIN: CPT | Performed by: NEUROLOGICAL SURGERY

## 2023-11-29 PROCEDURE — 99213 OFFICE O/P EST LOW 20 MIN: CPT | Performed by: ORTHOPAEDIC SURGERY

## 2023-11-29 NOTE — PROGRESS NOTES
Chief Complaint: Left shoulder pain follow-up    HPI:    Celio Cote is a 51year old Female who presents today for follow-up of left shoulder pain      Description of symptoms: Patient last seen on 11/7/2023. Reported chronic left shoulder and arm pain. Reports that symptoms were worsened when trying to pull a cart at work. This incident happened about 2 months prior to the last office visit. Also has a known history of neck problems for which she is under the care of of neurosurgery. Subsequent to the previous office visit, a left shoulder MRI was performed and this reveals a mild distal supraspinatus insertional tendinosis without any evidence of full-thickness rotator cuff tear. There is some suspicion of possible superior labral tear. Notably, patient also had an MRI of her cervical spine performed on 11/26/2023. This reveals multilevel cervical degenerative disc disease with diffuse disc osteophyte complexes noted at C3-4 through C6-7 levels with persistent cord impingement and without cord signal abnormality. There is also a persistent moderate to severe left C5-6 and C6-7 neuroforaminal narrowing. Symptomatically, the patient reports today her primary concern is pain which radiates from the posterior aspect of the left side of her neck to her left posterior shoulder and all the way down to her left hand mostly to her thumb. Symptoms are made worse both with neck as well as left shoulder motion but significantly more with the neck motion. She does report some intermittent tingling sensation of the left upper extremity as well as a generalized sense of weakness of the entire left upper extremity. There is no reported gait instability. I have personally reviewed pertinent films in PACS and my interpretation is as noted above in the HPI.     Patient Active Problem List   Diagnosis    Hypertriglyceridemia    Vitamin D deficiency    Generalized anxiety disorder    Cervical radiculopathy Migraine without aura and without status migrainosus, not intractable    DDD (degenerative disc disease), cervical    Spondylosis of cervical spine without myelopathy    Chronic pain of left knee    Type 2 diabetes mellitus without complication, without long-term current use of insulin (HCC)    Nasal itching    Chronic low back pain without sciatica    Latent tuberculosis by blood test    Seasonal allergies        Current Outpatient Medications on File Prior to Visit   Medication Sig Dispense Refill    acetaminophen (TYLENOL) 500 mg tablet Take 500 mg by mouth as needed        atorvastatin (LIPITOR) 10 mg tablet Take 1 tablet (10 mg total) by mouth daily 90 tablet 3    clotrimazole-betamethasone (LOTRISONE) 1-0.05 % cream Apply topically 2 (two) times a day (Patient not taking: Reported on 11/2/2023) 45 g 3    ergocalciferol (ERGOCALCIFEROL) 1.25 MG (86412 UT) capsule Take 1 tablet by mouth once a week (Patient not taking: Reported on 11/2/2023)      fluticasone (FLONASE) 50 mcg/act nasal spray 1 spray into each nostril daily (Patient taking differently: 1 spray into each nostril as needed) 48 g 3    FREESTYLE LITE test strip USE TO TEST BLOOD SUGAR AS DIRECTED 100 each 5    hydrocortisone (ANUSOL-HC) 2.5 % rectal cream Apply topically 2 (two) times a day (Patient not taking: Reported on 11/2/2023) 28 g 0    metFORMIN (GLUCOPHAGE-XR) 500 mg 24 hr tablet Take 1 tablet (500 mg total) by mouth daily with breakfast 180 tablet 1    olopatadine (PATANOL) 0.1 % ophthalmic solution Administer 1 drop to both eyes 2 (two) times a day (Patient not taking: Reported on 11/2/2023) 5 mL 5    triamcinolone (KENALOG) 0.147 MG/GM topical spray Apply topically 2 (two) times a day (Patient not taking: Reported on 11/2/2023) 63 g 5     No current facility-administered medications on file prior to visit.         Allergies   Allergen Reactions    Bee Pollen Itching     Itchy, runny nose    Dust Mite Extract Itching     Itchy, runny nose Pollen Extract Itching     Itchy, runny nose        Tobacco Use: Low Risk  (11/7/2023)    Patient History     Smoking Tobacco Use: Never     Smokeless Tobacco Use: Never     Passive Exposure: Not on file        Social Determinants of Health     Tobacco Use: Low Risk  (11/7/2023)    Patient History     Smoking Tobacco Use: Never     Smokeless Tobacco Use: Never     Passive Exposure: Not on file   Alcohol Use: Not on file   Financial Resource Strain: Not on file   Food Insecurity: Not on file   Transportation Needs: Not on file   Physical Activity: Not on file   Stress: Not on file   Social Connections: Not on file   Intimate Partner Violence: Not on file   Depression: Not at risk (1/25/2023)    PHQ-2     PHQ-2 Score: 0   Housing Stability: Not on file   Utilities: Not on file               Review of Systems     Body mass index is 28.57 kg/m². Physical Exam  Vitals and nursing note reviewed. HENT:      Head: Atraumatic. Eyes:      Conjunctiva/sclera: Conjunctivae normal.   Cardiovascular:      Rate and Rhythm: Normal rate. Pulses: Normal pulses. Pulmonary:      Effort: Pulmonary effort is normal. No respiratory distress. Neurological:      Mental Status: She is alert and oriented to person, place, and time. Psychiatric:         Mood and Affect: Mood normal.         Behavior: Behavior normal.          Ortho Exam:    Body part: left shoulder    Inspection: No visible deformity    Palpation: Diffusely tender to palpation with muscle spasms and myofascial trigger points in the left trapezius and the left cervical paraspinal region. Range of motion: Forward flexion is 160 degrees, abduction is 150 degrees, internal rotation is at the upper lumbar level, normal cross adduction, external rotation in adduction is 75 degrees    Special Tests: Mild discomfort with Meyer and Neer's. Shoulder strength testing limited due to patient's diffuse cervical and shoulder girdle pain.   However, this is at least 4+/5 in all muscle groups of the left rotator cuff. No shoulder apprehension. Distal Neurovascular Status: Intact, No (provide explanation): Paresthesia in the left C5 and C6 dermatomal distribution. 2+ left biceps, brachioradialis and triceps reflexes. Positive Spurling's on the left. Negative Sariah sign. Assessment:     Diagnosis ICD-10-CM Associated Orders   1. Cervical radiculopathy  M54.12       2. Spondylosis of cervical spine without myelopathy  M47.812       3. Tendinosis of left rotator cuff  M67.814            Plan:    I explained my current clinical findings and reviewed the recent cervical spine as well as left shoulder MRI findings with the patient today. Based on her clinical presentation and radiological evaluation, I suspect that the majority of her symptoms at this time are emanating from her cervical disc disease at the C5 and C6 levels on the left side. She does have some mild rotator cuff tendinosis and potential labral tear in the left shoulder. However, these findings do not fully correlate with her clinical presentation of diffuse entire left upper extremity pain, paresthesia and weakness. I recommend her to follow-up with neurosurgery or pain management in this regard. Patient expressed understanding and was in agreement with the treatment plan. Portions of the record may have been created with voice recognition software. Occasional wrong word or "sound alike" substitutions may have occurred due to the inherent limitations of voice recognition software. Please review the chart carefully and recognize, using context, where substitutions/typographical errors may have occurred.

## 2023-11-29 NOTE — PROGRESS NOTES
Office Note - Neurosurgery   Avis Cuadra 51 y.o. female MRN: [id number]      Assessment:    Patient is gradually improving. 65-year-old woman with left-sided cervicalgia rating into the left arm, likely radicular in nature. Fortunately her symptoms are improving over time. Her MRI does demonstrate stenosis but she has no objective findings of radiculopathy or myelopathy on examination. I explained that she may be at slightly higher risk of acute spinal cord injury in the event of extreme range of motion of the cervical spine. This could result in quadriplegia or ventilator dependence. However surgical prophylactic decompression is not necessarily recommended. I also explained that a decompression would likely involve a multilevel procedure and fusion. At present she is not interested in surgical intervention, especially since her and symptoms are improving. I will refer to physical therapy for neck strengthening and range of motion exercises. She will continue use heat at home. She may wish to discuss a course of membrane stabilizing agents with her PCP. We will also refer her to pain management to discuss possible injections and pain medication. Reviewed the signs and symptoms of progressive cervical myelopathy and asked her to contact this office should any concerns arise. She will follow-up otherwise in 1 years time for ongoing clinical surveillance. History, physical examination and diagnostic tests were reviewed and questions answered. Diagnosis, care plan and treatment options were discussed. The patient understand instructions and will follow up as directed.     Plan:    Follow-up: 1 year    Problem List Items Addressed This Visit          Nervous and Auditory    Cervical radiculopathy    Relevant Orders    Ambulatory Referral to Physical Therapy    Ambulatory referral to Spine & Pain Management       Musculoskeletal and Integument    Spondylosis of cervical spine without myelopathy - Primary    Relevant Orders    Ambulatory Referral to Physical Therapy    Ambulatory referral to Spine & Pain Management       Subjective/Objective     Chief Complaint    Neck and left arm pain, improving. HPI    Pleasant 41-year-old right-hand-dominant woman being seen in follow-up for neck and left arm and shoulder pain. Since her last visit her symptoms have improved. He currently describes left neck pain which can radiate into the shoulder and occasionally into the left biceps and elbow. The biceps and elbow pain occurs mostly at night and she describes easy fatigability of the whole arm without shirin weakness. She denies any pain, weakness or numbness in her right arm on her legs or difficulties with bowel and bladder function. Over-the-counter pain medication is somewhat helpful. Local heat is helpful as well. She tries to stretch daily at home but has not seen a physical therapist recently for this. She has not tried prescription pain medication or injections. She presents today to review the results of an MRI of the cervical spine. She just saw her sports medicine specialist who reviewed an MRI of the shoulder which shows some mild injury, but overall it is felt that her symptoms are likely stemming from her cervical spine. She also notes intermittent left facial numbness and possible droop. This has occurred in the past and times of stress. ALEXANDRA MORRIS personally reviewed and updated. Review of Systems   HENT:  Positive for tinnitus (b/l ear problem- mostly Left). Negative for trouble swallowing. Eyes:  Negative for visual disturbance. Respiratory:  Positive for chest tightness. Negative for shortness of breath. Gastrointestinal: Negative. Genitourinary: Negative. Musculoskeletal:  Positive for back pain, joint swelling (left arm) and neck pain. Negative for gait problem.         F/u after mri cspine sched 11/26/23    Neck with Left shoulder pain & back pain w left arm weakness & left face weakness with some N/T/W Left arm and fingers at night ( patient states stretches will help )    Skin:  Positive for wound. Pallor: bruise on face and leg, wound on head. Allergic/Immunologic: Positive for environmental allergies. Neurological:  Positive for weakness (left arm), numbness and headaches. Negative for seizures. Psychiatric/Behavioral:  Positive for sleep disturbance. Negative for confusion and decreased concentration. All other systems reviewed and are negative. Family History    Family History   Problem Relation Age of Onset   • Diabetes Mother    • Hypertension Mother    • Heart attack Mother    • Stroke Father    • No Known Problems Daughter    • No Known Problems Maternal Grandmother    • No Known Problems Maternal Grandfather    • No Known Problems Paternal Grandmother    • No Known Problems Paternal Grandfather    • No Known Problems Maternal Aunt    • No Known Problems Maternal Aunt    • No Known Problems Maternal Aunt    • No Known Problems Paternal Aunt    • No Known Problems Paternal Aunt    • No Known Problems Paternal Aunt    • No Known Problems Paternal Aunt    • No Known Problems Paternal Aunt        Social History    Social History     Socioeconomic History   • Marital status: /Civil Union     Spouse name: Not on file   • Number of children: Not on file   • Years of education: Not on file   • Highest education level: Not on file   Occupational History   • Not on file   Tobacco Use   • Smoking status: Never   • Smokeless tobacco: Never   Vaping Use   • Vaping Use: Never used   Substance and Sexual Activity   • Alcohol use: No   • Drug use: No   • Sexual activity: Yes   Other Topics Concern   • Not on file   Social History Narrative     since 1996. 3 children. 23,20, 18. All at home . 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  works for the [employer]. Enjoys cooking.       Social Determinants of Health Financial Resource Strain: Not on file   Food Insecurity: Not on file   Transportation Needs: Not on file   Physical Activity: Not on file   Stress: Not on file   Social Connections: Not on file   Intimate Partner Violence: Not on file   Housing Stability: Not on file       Past Medical History    Past Medical History:   Diagnosis Date   • Allergic    • Cervical radiculopathy 2019   • Chronic low back pain without sciatica 2022   • COVID-19 virus infection 11/15/2020   • DDD (degenerative disc disease), cervical 10/07/2019   • Diabetes mellitus ([address])    • Generalized anxiety disorder 2018   • Headache(784.0)    • Hypertriglyceridemia 2018   • 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 Treated for 1 month and got side effects. , declines further treatment. Prefers to have a chest x-ray needed for her employment. • Migraine without aura and without status migrainosus, not intractable 10/03/2019   • Type 2 diabetes mellitus without complication, without long-term current use of insulin ([address]) 2021       Surgical History    Past Surgical History:   Procedure Laterality Date   •  SECTION     • CHOLECYSTECTOMY     • TOTAL ABDOMINAL HYSTERECTOMY      Due to fibroids.  Ovaries present       Medications      Current Outpatient Medications:   •  acetaminophen (TYLENOL) 500 mg tablet, Take 500 mg by mouth as needed  , Disp: , Rfl:   •  atorvastatin (LIPITOR) 10 mg tablet, Take 1 tablet (10 mg total) by mouth daily, Disp: 90 tablet, Rfl: 3  •  fluticasone (FLONASE) 50 mcg/act nasal spray, 1 spray into each nostril daily, Disp: 48 g, Rfl: 3  •  metFORMIN (GLUCOPHAGE-XR) 500 mg 24 hr tablet, Take 1 tablet (500 mg total) by mouth daily with breakfast, Disp: 180 tablet, Rfl: 1  •  olopatadine (PATANOL) 0.1 % ophthalmic solution, Administer 1 drop to both eyes 2 (two) times a day (Patient taking differently: Administer 1 drop to both eyes if needed), Disp: 5 mL, Rfl: 5  •  clotrimazole-betamethasone (LOTRISONE) 1-0.05 % cream, Apply topically 2 (two) times a day (Patient not taking: Reported on 11/2/2023), Disp: 45 g, Rfl: 3  •  ergocalciferol (ERGOCALCIFEROL) 1.25 MG (06405 UT) capsule, Take 1 tablet by mouth once a week (Patient not taking: Reported on 11/2/2023), Disp: , Rfl:   •  FREESTYLE LITE test strip, USE TO TEST BLOOD SUGAR AS DIRECTED (Patient not taking: Reported on 11/29/2023), Disp: 100 each, Rfl: 5  •  hydrocortisone (ANUSOL-HC) 2.5 % rectal cream, Apply topically 2 (two) times a day (Patient not taking: Reported on 11/2/2023), Disp: 28 g, Rfl: 0  •  triamcinolone (KENALOG) 0.147 MG/GM topical spray, Apply topically 2 (two) times a day (Patient not taking: Reported on 11/2/2023), Disp: 63 g, Rfl: 5    Allergies    Allergies   Allergen Reactions   • Bee Pollen Itching     Itchy, runny nose   • Dust Mite Extract Itching     Itchy, runny nose   • Pollen Extract Itching     Itchy, runny nose       The following portions of the patient's history were reviewed and updated as appropriate: allergies, current medications, past family history, past medical history, past social history, past surgical history, and problem list.    Investigations    I personally reviewed the MRI results with the patient:    MRI of the cervical spine without contrast dated November 26, 2023. Narrative & Impression   MRI CERVICAL SPINE WITHOUT CONTRAST     INDICATION: M54.12: Radiculopathy, cervical region. COMPARISON: Cervical spine 11/12/2021     TECHNIQUE:  Multiplanar, multisequence imaging of the cervical spine was performed. .        IMAGE QUALITY:  Diagnostic     FINDINGS:     ALIGNMENT: There is straightening of the mid to upper cervical lordosis. No compression fracture. No subluxation. No scoliosis. MARROW SIGNAL: Mild marrow signal heterogeneity which can be seen with underlying red marrow proliferation.  There is no suspicious osseous lesion. CERVICAL AND VISUALIZED THORACIC CORD:  Normal signal within the visualized cord. PREVERTEBRAL AND PARASPINAL SOFT TISSUES:  Normal.     VISUALIZED POSTERIOR FOSSA:  The visualized posterior fossa demonstrates no abnormal signal.     CERVICAL DISC SPACES:     C2-3: No focal disc herniation, central canal stenosis, or neural foraminal narrowing. C3-4: Diffuse disc osteophyte complex with bilateral uncovertebral hypertrophy partially and superimposed small broad-based central disc protrusion. There is effacement of the ventral and dorsal subarachnoid spaces compatible with cord impingement. No   evidence of cord signal abnormality. Moderate central canal narrowing. Right and moderate left neural foraminal stenosis, unchanged. C4-5: Diffuse disc bulge and stable broad-based right paracentral/foraminal disc osteophyte complex abutting the right aspect of the cord. There is effacement of the ventral and dorsal subarachnoid spaces compatible with cord impingement. No evidence of   cord signal abnormality. Moderate right  central canal narrowing. Mild to moderate right neural foraminal stenosis. C5-6: Diffuse disc osteophyte complex, eccentric to the right with bilateral uncovertebral hypertrophy. There is effacement of the ventral and dorsal subarachnoid spaces compatible with cord impingement. No evidence of cord signal abnormality. Moderate   central canal narrowing. Moderate moderate to severe left to the moderate to severe left neural foraminal stenosis, unchanged. C6-7: Diffuse disc osteophyte complex with bilateral uncovertebral hypertrophy partially and superimposed left paracentral disc protrusion, decreased in size. There is effacement of the ventral and dorsal subarachnoid spaces compatible with cord   impingement. No evidence of cord signal abnormality. Moderate central canal narrowing. Moderate right and moderate to severe left neural foraminal stenosis.      C7-T1: No focal disc herniation, central canal stenosis, or neural foraminal narrowing. UPPER THORACIC DISC SPACES:  Normal.     OTHER FINDINGS: Small bilateral submandibular, jugular chain and posterior triangle lymph nodes which are below size criteria for pathologic adenopathy. IMPRESSION:     Multilevel cervical degenerative disc disease similar to 11/12/2021 with no new disc herniation. Diffuse disc osteophyte complexes are again noted at the C3-4 through the C6-7 levels with persistent cord impingement without cord signal abnormality to   suggest myelomalacia or edema. Persistent moderate to severe left C5-6 and C6-7 neural foraminal narrowing. Physical Exam    Vitals:  Blood pressure 102/78, pulse 72, temperature 98.1 °F (36.7 °C), temperature source Temporal, resp. rate 14, height 5' 6" (1.676 m), weight 80.3 kg (177 lb), SpO2 99 %. ,Body mass index is 28.57 kg/m². Physical Exam  Vitals reviewed. Constitutional:       General: She is not in acute distress. Eyes:      Extraocular Movements: Extraocular movements intact. Pulmonary:      Effort: Pulmonary effort is normal. No respiratory distress. Musculoskeletal:      Cervical back: Normal range of motion. Skin:     General: Skin is warm and dry. Neurological:      Mental Status: She is alert and oriented to person, place, and time. Cranial Nerves: No cranial nerve deficit. Sensory: No sensory deficit. Motor: No weakness. Coordination: Coordination normal.      Gait: Gait normal.   Psychiatric:         Mood and Affect: Mood normal.         Behavior: Behavior normal.     Neurologic Exam     Mental Status   Oriented to person, place, and time.

## 2024-01-24 NOTE — PROGRESS NOTES
FAMILY PRACTICE OFFICE VISIT       NAME: Asa Cobian  AGE: 38 y o  SEX: female       : 1975        MRN: [id number]    DATE: 2019  TIME: 8:56 PM    Assessment and Plan     Problem List Items Addressed This Visit        Other    Hypertriglyceridemia    Vitamin D deficiency    Vitamin B12 deficiency    Nonintractable headache    Relevant Orders    Ambulatory referral to Neurology      Other Visit Diagnoses     Type 2 diabetes mellitus without complication, without long-term current use of insulin ([address] )    -  Primary    Relevant Orders    Glucometer    Glucometer test strips    Lancets        Hypertriglyceridemia:  Patient continues to take atorvastatin  Have advised on working on lifestyle modifications by increasing heart healthy fats in the diet by incorporating Mediterranean diet, starting routine exercise regimen  Patient lipid panel  Will notify her of results  Diabetes:  Patient had A1c drawn today  She continues take metformin 500 mg daily  Up-to-date with foot exam     Headaches:  Continues experience headaches  MRI brain negative  Will schedule appointment with Neurology  May benefit from headache diary  Maintain adequate hydration  Increase the amount of sleep hours  Start routine exercise regimen  Patient also experiences left-sided neck pain  Feel this is contributing to headaches  She will schedule appointment with physical therapy as well  Vitamin-D deficiency:  Patient continues take vitamin-D 50,000 International Units weekly  Patient had blood work done today  Vitamin B12 deficiency:  Patient continues to take supplementation  Patient had blood work done today  Routine health maintenance:  Patient will schedule appoint with GI to discuss initial screening colonoscopy  Up-to-date with mammogram   Up-to-date with Tdap  There are no Patient Instructions on file for this visit          Chief Complaint     Chief Complaint   Patient presents with   Abdulkadir Bryant Headache     for 4 days       History of Present Illness     HPI   26-year-old female here for routine follow-up of chronic conditions  Patient had blood work done this morning  Patient states she had a headache from Saturday to Monday morning, located over left temple, described as throbbing  Patient also has photophobia  Patient states she did have associated nausea, took Tylenol, improved by Monday  She will schedule follow-up appointment with Neurology  If she watches tv at an angle, will get headcahe  Patient feels her headaches are due to her neck pain, mainly over the left side  She is interested in going for physical therapy and will look into her prior physical therapist which helped her  Last eye appt was 6 months ago  Taking vit b12   Review of Systems   Review of Systems   Constitutional: Negative for appetite change and unexpected weight change  Respiratory: Negative for shortness of breath  Cardiovascular: Negative  Gastrointestinal: Negative for abdominal pain  Genitourinary: Negative for dysuria  Neurological: Negative for dizziness and light-headedness  Psychiatric/Behavioral: Negative for dysphoric mood  Active Problem List     Patient Active Problem List   Diagnosis    Hypertriglyceridemia    Elevated blood sugar    Vitamin D deficiency    Neck pain    Generalized anxiety disorder    Insomnia    Impaired fasting glucose    Fatigue    Routine health maintenance    Vitamin B12 deficiency    Nonintractable headache    Left shoulder pain    Cervical radiculopathy    Myofascial pain       Past Medical History:  History reviewed  No pertinent past medical history  Past Surgical History:  Past Surgical History:   Procedure Laterality Date     SECTION      CHOLECYSTECTOMY      TOTAL ABDOMINAL HYSTERECTOMY      Due to fibroids   Ovaries present       Family History:  Family History   Problem Relation Age of Onset    Diabetes Mother     Hypertension Mother     Heart attack Mother     Stroke Father     No Known Problems Daughter     No Known Problems Maternal Grandmother     No Known Problems Maternal Grandfather     No Known Problems Paternal Grandmother     No Known Problems Paternal Grandfather     No Known Problems Maternal Aunt     No Known Problems Maternal Aunt     No Known Problems Maternal Aunt     No Known Problems Paternal Aunt     No Known Problems Paternal Aunt     No Known Problems Paternal Aunt     No Known Problems Paternal Aunt     No Known Problems Paternal Aunt        Social History:  Social History     Socioeconomic History    Marital status: /Civil Union     Spouse name: Not on file    Number of children: Not on file    Years of education: Not on file    Highest education level: Not on file   Occupational History    Not on file   Social Needs    Financial resource strain: Not on file    Food insecurity:     Worry: Not on file     Inability: Not on file    Transportation needs:     Medical: Not on file     Non-medical: Not on file   Tobacco Use    Smoking status: Never Smoker    Smokeless tobacco: Never Used   Substance and Sexual Activity    Alcohol use: No    Drug use: No    Sexual activity: Not on file   Lifestyle    Physical activity:     Days per week: Not on file     Minutes per session: Not on file    Stress: Not on file   Relationships    Social connections:     Talks on phone: Not on file     Gets together: Not on file     Attends Confucianism service: Not on file     Active member of club or organization: Not on file     Attends meetings of clubs or organizations: Not on file     Relationship status: Not on file    Intimate partner violence:     Fear of current or ex partner: Not on file     Emotionally abused: Not on file     Physically abused: Not on file     Forced sexual activity: Not on file   Other Topics Concern    Not on file   Social History Narrative    Caffeine use    Description: Tea 1 cup     I have reviewed the patient's medical history in detail; there are no changes to the history as noted in the electronic medical record  Objective     Vitals:    09/19/19 0946   BP: 124/78   Pulse: 75   Resp: 18   Temp: (!) 97 °F (36 1 °C)   SpO2: 98%     Wt Readings from Last 3 Encounters:   09/19/19 78 7 kg (173 lb 6 4 oz)   07/15/19 81 2 kg (179 lb)   05/06/19 81 3 kg (179 lb 3 2 oz)       Physical Exam   Constitutional: She is oriented to person, place, and time  She appears well-developed and well-nourished  HENT:   Head: Normocephalic and atraumatic  Mouth/Throat: Oropharynx is clear and moist    TMs intact and clear   Eyes: Pupils are equal, round, and reactive to light  Conjunctivae and EOM are normal    Neck: Normal range of motion  Neck supple  No thyromegaly present  Cardiovascular: Normal rate, regular rhythm and normal heart sounds  Pulmonary/Chest: Effort normal and breath sounds normal    Abdominal: Soft  Bowel sounds are normal  She exhibits no distension  There is no tenderness  Musculoskeletal: Normal range of motion  She exhibits no edema  Lymphadenopathy:     She has no cervical adenopathy  Neurological: She is alert and oriented to person, place, and time  Psychiatric: She has a normal mood and affect  Nursing note and vitals reviewed        Pertinent Laboratory/Diagnostic Studies:  Lab Results   Component Value Date    BUN 7 09/19/2019    CREATININE 0 62 09/19/2019    CALCIUM 8 7 09/19/2019    K 3 7 09/19/2019    CO2 26 09/19/2019     09/19/2019     Lab Results   Component Value Date    ALT 23 09/19/2019    AST 11 09/19/2019    ALKPHOS 59 09/19/2019       Lab Results   Component Value Date    WBC 9 26 03/22/2019    HGB 13 3 03/22/2019    HCT 38 6 03/22/2019    MCV 86 03/22/2019     03/22/2019       No results found for: TSH    No results found for: CHOL  Lab Results   Component Value Date    TRIG 458 (H) 09/19/2019     Lab Results   Component Value Date HDL 36 (L) 09/19/2019     Lab Results   Component Value Date    [facility]  09/19/2019      Comment:      Calculated LDL invalid, triglycerides >400 mg/dl     Lab Results   Component Value Date    HGBA1C 5 6 09/19/2019       Results for orders placed or performed in visit on 03/22/19   Urine culture   Result Value Ref Range    Urine Culture 70,000-79,000 cfu/ml     UA (URINE) with reflex to Microscopic   Result Value Ref Range    Color, UA Yellow     Clarity, UA Cloudy     Specific [place], UA 1 015 1 003 - 1 030    pH, UA 6 0 4 5, 5 0, 5 5, 6 0, 6 5, 7 0, 7 5, 8 0    Leukocytes, UA Negative Negative    Nitrite, UA Negative Negative    Protein, UA Negative Negative mg/dl    Glucose, UA Negative Negative mg/dl    Ketones, UA Negative Negative mg/dl    Urobilinogen, UA 0 2 0 2, 1 0 E U /dl E U /dl    Bilirubin, UA Negative Negative    Blood, UA Negative Negative   POCT urine dip   Result Value Ref Range    LEUKOCYTE ESTERASE,UA negative     NITRITE,UA negative     SL AMB POCT UROBILINOGEN 0 2     POCT URINE PROTEIN negative      PH,UA 5 0     BLOOD,UA +     SPECIFIC GRAVITY,UA 1 015     KETONES,UA negative     BILIRUBIN,UA negative     GLUCOSE, UA negative      COLOR,UA yellow     CLARITY,UA clear        Orders Placed This Encounter   Procedures    Glucometer    Glucometer test strips    Lancets    Ambulatory referral to Neurology       ALLERGIES:  Allergies   Allergen Reactions    Dust Mite Extract Itching     Itchy, runny nose    Pollen Extract Itching     Itchy, runny nose       Current Medications     Current Outpatient Medications   Medication Sig Dispense Refill    acetaminophen (TYLENOL) 500 mg tablet Take 500 mg by mouth as needed        atorvastatin (LIPITOR) 20 mg tablet TAKE 1 TABLET (20 MG TOTAL) BY MOUTH DAILY AT BEDTIME  3    Cetirizine HCl (ZYRTEC ALLERGY PO) Take 1 tablet by mouth daily      cyanocobalamin (VITAMIN B-12) 1,000 mcg tablet Take 2,000 mcg by mouth daily      meloxicam (MOBIC) 15 mg tablet Take 1 tablet (15 mg total) by mouth daily as needed for moderate pain 30 tablet 1    metFORMIN (GLUCOPHAGE) 500 mg tablet TAKE 1 TABLET EVERY DAY WITH A MEAL 30 tablet 5    methocarbamol (ROBAXIN) 750 mg tablet Take 1 tablet (750 mg total) by mouth 3 (three) times a day as needed for muscle spasms 90 tablet 1    ergocalciferol (VITAMIN D2) 50,000 units Take 1 capsule (50,000 Units total) by mouth once a week 12 capsule 0     No current facility-administered medications for this visit            Health Maintenance     Health Maintenance   Topic Date Due    Pneumococcal Vaccine: Pediatrics (0 to 5 Years) and At-Risk Patients (6 to 59 Years) (1 of 1 - PPSV23) 01/03/1980    BMI: Followup Plan  01/03/1992    HEPATITIS B VACCINES (1 of 3 - Risk 3-dose series) 01/03/1993    INFLUENZA VACCINE  07/01/2019    Depression Screening PHQ  07/22/2020    BMI: Adult  09/19/2020    MAMMOGRAM  07/15/2021    DTaP,Tdap,and Td Vaccines (3 - Td) 03/09/2026    Pneumococcal Vaccine: 65+ Years (1 of 2 - PCV13) 01/03/2039     Immunization History   Administered Date(s) Administered    Fluzone Split Quad 0 5 mL 11/09/2015    INFLUENZA 11/09/2015, 11/01/2018    Td (adult), adsorbed 03/09/2016    Tdap 01/01/2016       Timmy Ordaz MD flank pain

## 2024-02-07 ENCOUNTER — OFFICE VISIT (OUTPATIENT)
Dept: GASTROENTEROLOGY | Facility: CLINIC | Age: 49
End: 2024-02-07
Payer: COMMERCIAL

## 2024-02-07 VITALS
DIASTOLIC BLOOD PRESSURE: 72 MMHG | BODY MASS INDEX: 28.61 KG/M2 | WEIGHT: 178 LBS | TEMPERATURE: 97.8 F | HEIGHT: 66 IN | SYSTOLIC BLOOD PRESSURE: 106 MMHG

## 2024-02-07 DIAGNOSIS — R14.0 ABDOMINAL DISTENTION: ICD-10-CM

## 2024-02-07 DIAGNOSIS — K76.0 HEPATIC STEATOSIS: ICD-10-CM

## 2024-02-07 DIAGNOSIS — R14.0 ABDOMINAL BLOATING: Primary | ICD-10-CM

## 2024-02-07 PROCEDURE — 99214 OFFICE O/P EST MOD 30 MIN: CPT | Performed by: PHYSICIAN ASSISTANT

## 2024-02-07 NOTE — PROGRESS NOTES
[facility] - Outpatient Follow-up Note  Triny Torre 49 y.o. female MRN: [id number]  Encounter: [id number]  ASSESSMENT AND PLAN:   1. Abdominal bloating  2. Abdominal distention  Patient with ongoing abdominal bloating and distention.  She denies abdominal pain.  She is having regular bowel movements.  Her weight has been about the same.  She has had previously unremarkable EGD and colonoscopy with biopsies negative for celiac disease and H. pylori.     At this time we will order a CT of the abdomen and pelvis for further evaluation of her ongoing symptoms symptoms to rule out possible underlying GI or gynecological/ovarian malignancy.  Patient expressed understanding and in agreement.  I recommended that she use Gas-X as needed over-the-counter in the meantime and see if this improves her symptoms.      Should symptoms persist in follow-up and CT scan unremarkable would order SIBO testing     - CT abdomen pelvis w contrast; Future    3. Hepatic steatosis   We reviewed her recent complete abdominal ultrasound results.  Patient expressed understanding to results.  I educated patient on hepatic steatosis and fatty liver.  She expressed understanding.  Recommended low fat healthy diet, alcohol cessation, and activity as tolerated to promote weight loss as treatment for fatty liver.   She has a low NAFLD fibrosis score, little to no suspicion for liver fibrosis at this time.  Her albumin and platelets are normal.    Patient was instructed to call the office with any questions, concerns, new/ worsening/ persisting GI symptoms. Advised patient go to the ER with any severe or worsening abdominal pain, fevers/ chills, intractable N/V, chest pain, SOB, dizziness, lightheadedness, feeling something stuck in esophagus that will not go down. Patient expressed understanding and is in agreement with treatment plan.     Will plan to follow up after diagnostic tests  "  __________________________________________________________    SUBJECTIVE:      Patient with a PMH of DM, migraines, DD, anxiety, fatty liver, prior cholecystectomy, presents for follow up.  Patient was last seen by me in the office 10/25/2023, previous office note was reviewed.  At last office visit I ordered a CBC and CMP.  I also ordered an abdominal x-ray and a complete abdominal ultrasound.  I recommended patient try Gas-X over-the-counter as needed for symptoms and monitor response.  Recent labs reviewed from 11/2023 and unremarkable.  Abdominal x-ray 11/1/2023 reviewed, this showed no bowel obstruction, mild stool retention.  Complete abdominal ultrasound 11/1/2023 reviewed, this showed hepatic steatosis, otherwise was normal status post cholecystectomy.    Patient continues to complain of abdominal bloating and distention.  She does not have any abdominal pain.  The abdominal  bloating has been going on for years to the point that \"she can look like she is pregnant\".  No certain food triggers. The bloating is concerning to the patient. She states she has a healthy diet.   Bowels are regular, ~ 2 x/ day for patient.   She did not try GasX as previously recommended.   Her weight is stable and up 2 pounds from last visit.  Patient denies any fevers/ chills,  abdominal pain, nausea, vomiting, change in bowel habits, diarrhea, constipation, black or bloody stools, heartburn, dysphagia, odynophagia.   Denies chest pain, SOB    She does have a history of hysterectomy per patient's he still has her ovaries.    Review of Systems   Constitutional:  Negative for chills and fever.   HENT:  Negative for ear pain and sore throat.    Eyes:  Negative for pain and visual disturbance.   Respiratory:  Negative for cough and shortness of breath.    Cardiovascular:  Negative for chest pain and palpitations.   Gastrointestinal:  Positive for abdominal distention. Negative for abdominal pain and vomiting.   Genitourinary:  " Negative for dysuria and hematuria.   Musculoskeletal:  Negative for arthralgias and back pain.   Skin:  Negative for color change and rash.   Neurological:  Negative for seizures and syncope.   All other systems reviewed and are negative.         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  Treated for 1 month and got side effects.  , declines further treatment.  Prefers to have a chest x-ray needed for her employment.    Migraine without aura and without status migrainosus, not intractable 10/03/2019    Type 2 diabetes mellitus without complication, without long-term current use of insulin (HCC) 2021     Past Surgical History:   Procedure Laterality Date     SECTION      CHOLECYSTECTOMY      TOTAL ABDOMINAL HYSTERECTOMY      Due to fibroids. Ovaries present     Social History   Social History     Substance and Sexual Activity   Alcohol Use No     Social History     Substance and Sexual Activity   Drug Use No     Social History     Tobacco Use   Smoking Status Never   Smokeless Tobacco Never     Family History   Problem Relation Age of Onset    Diabetes Mother     Hypertension Mother     Heart attack Mother     Stroke Father     No Known Problems Daughter     No Known Problems Maternal Grandmother     No Known Problems Maternal Grandfather     No Known Problems Paternal Grandmother     No Known Problems Paternal Grandfather     No Known Problems Maternal Aunt     No Known Problems Maternal Aunt     No Known Problems Maternal Aunt     No Known Problems Paternal  Aunt     No Known Problems Paternal Aunt     No Known Problems Paternal Aunt     No Known Problems Paternal Aunt     No Known Problems Paternal Aunt        Meds/Allergies       Current Outpatient Medications:     acetaminophen (TYLENOL) 500 mg tablet    atorvastatin (LIPITOR) 10 mg tablet    fluticasone (FLONASE) 50 mcg/act nasal spray    metFORMIN (GLUCOPHAGE-XR) 500 mg 24 hr tablet    clotrimazole-betamethasone (LOTRISONE) 1-0.05 % cream    ergocalciferol (ERGOCALCIFEROL) 1.25 MG (58223 UT) capsule    FREESTYLE LITE test strip    hydrocortisone (ANUSOL-HC) 2.5 % rectal cream    olopatadine (PATANOL) 0.1 % ophthalmic solution    triamcinolone (KENALOG) 0.147 MG/GM topical spray    Allergies   Allergen Reactions    Bee Pollen Itching     Itchy, runny nose    Dust Mite Extract Itching     Itchy, runny nose    Pollen Extract Itching     Itchy, runny nose           Objective     Wt Readings from Last 3 Encounters:   02/07/24 80.7 kg (178 lb)   11/29/23 80.3 kg (177 lb)   11/29/23 80.3 kg (177 lb)     Temp Readings from Last 3 Encounters:   02/07/24 97.8 °F (36.6 °C) (Tympanic)   11/29/23 98.1 °F (36.7 °C) (Temporal)   11/02/23 98 °F (36.7 °C) (Temporal)     BP Readings from Last 3 Encounters:   02/07/24 106/72   11/29/23 102/78   11/29/23 119/75     Pulse Readings from Last 3 Encounters:   11/29/23 72   11/29/23 80   11/07/23 71          PHYSICAL EXAM:      Physical Exam  Vitals reviewed.   Constitutional:       General: She is not in acute distress.     Appearance: She is not toxic-appearing.   HENT:      Head: Normocephalic and atraumatic.   Eyes:      Extraocular Movements: Extraocular movements intact.      Conjunctiva/sclera: Conjunctivae normal.   Cardiovascular:      Rate and Rhythm: Normal rate and regular rhythm.   Pulmonary:      Effort: Pulmonary effort is normal. No respiratory distress.      Breath sounds: Normal breath sounds.   Abdominal:      General: Bowel sounds are normal. There is distension  (minimal).      Palpations: Abdomen is soft.      Tenderness: There is no abdominal tenderness.   Musculoskeletal:         General: No swelling or tenderness.      Cervical back: Normal range of motion and neck supple.   Skin:     General: Skin is warm and dry.      Coloration: Skin is not jaundiced.   Neurological:      General: No focal deficit present.      Mental Status: She is alert and oriented to person, place, and time. Mental status is at baseline.   Psychiatric:         Mood and Affect: Mood normal.         Behavior: Behavior normal.         Thought Content: Thought content normal.          Lab Results:   No visits with results within 1 Day(s) from this visit.   Latest known visit with results is:   Lab on 11/01/2023   Component Date Value    Creatinine, Ur 11/01/2023 159.6     Albumin,U,Random 11/01/2023 <7.0     Albumin Creat Ratio 11/01/2023 <4     WBC 11/01/2023 7.61     RBC 11/01/2023 5.02     Hemoglobin 11/01/2023 15.1     Hematocrit 11/01/2023 43.9     MCV 11/01/2023 88     MCH 11/01/2023 30.1     MCHC 11/01/2023 34.4     RDW 11/01/2023 12.6     MPV 11/01/2023 11.3     Platelets 11/01/2023 275     nRBC 11/01/2023 0     Neutrophils Relative 11/01/2023 59     Immat GRANS % 11/01/2023 0     Lymphocytes Relative 11/01/2023 31     Monocytes Relative 11/01/2023 7     Eosinophils Relative 11/01/2023 2     Basophils Relative 11/01/2023 1     Neutrophils Absolute 11/01/2023 4.47     Immature Grans Absolute 11/01/2023 0.02     Lymphocytes Absolute 11/01/2023 2.39     Monocytes Absolute 11/01/2023 0.53     Eosinophils Absolute 11/01/2023 0.15     Basophils Absolute 11/01/2023 0.05     Vit D, 1,25-Dihydroxy 11/01/2023 54.5     Cholesterol 11/01/2023 233 (H)     Triglycerides 11/01/2023 282 (H)     HDL, Direct 11/01/2023 49 (L)     LDL Calculated 11/01/2023 128 (H)     Non-HDL-Chol (CHOL-HDL) 11/01/2023 184     Sodium 11/01/2023 139     Potassium 11/01/2023 4.1     Chloride 11/01/2023 100     CO2 11/01/2023 31      ANION GAP 11/01/2023 8     BUN 11/01/2023 12     Creatinine 11/01/2023 0.66     Glucose, Fasting 11/01/2023 90     Calcium 11/01/2023 9.7     AST 11/01/2023 17     ALT 11/01/2023 23     Alkaline Phosphatase 11/01/2023 57     Total Protein 11/01/2023 7.6     Albumin 11/01/2023 4.4     Total Bilirubin 11/01/2023 0.85     eGFR 11/01/2023 104     TSH 3RD GENERATON 11/01/2023 1.746      NAFLD Fibrosis Score is: -1.742    NAFLD Score Correlated Fibrosis Severity   <-1.455 F0-F2   -1.455-0.676 Indeterminate Score   >0.676 F3-F4   **Fibrosis Severity Scale: F0 = no fibrosis; F1= mild fibrosis; F2 = moderate fibrosis; F3 = severe fibrosis; F4 = cirrhosis    NAFLD Score Component Values:  Component Value Date   Age: 49 y.o.     BMI: 28.73 kg/m²    IFG or DM: Yes    AST: 17 U/L 11/1/2023   ALT: 23 U/L 11/1/2023   Platelet: 275 Thousands/uL 11/1/2023   Albumin: 4.4 g/dL 11/1/2023        Prior Procedure Results/Reports   Colonoscopy reviewed from 2/2022 done for CRC completely normal aside from one colon polyp (benign) which was removed. Recall colonoscopy recommended in 10 years.     EGD reviewed from 2/2022 for LUQ pain and was completely normal. Gastric biopsies were negative for H pylori. SBB negative for celiac disease.     Pilar Scott PA-C   Available on [facility] [facility]

## 2024-02-19 ENCOUNTER — TELEPHONE (OUTPATIENT)
Dept: RADIOLOGY | Facility: HOSPITAL | Age: 49
End: 2024-02-19

## 2024-02-19 ENCOUNTER — APPOINTMENT (OUTPATIENT)
Dept: LAB | Facility: CLINIC | Age: 49
End: 2024-02-19
Payer: COMMERCIAL

## 2024-02-19 ENCOUNTER — TELEPHONE (OUTPATIENT)
Age: 49
End: 2024-02-19

## 2024-02-19 DIAGNOSIS — R14.0 ABDOMINAL BLOATING: ICD-10-CM

## 2024-02-19 LAB
ALBUMIN SERPL BCP-MCNC: 4.5 G/DL (ref 3.5–5)
ALP SERPL-CCNC: 57 U/L (ref 34–104)
ALT SERPL W P-5'-P-CCNC: 19 U/L (ref 7–52)
ANION GAP SERPL CALCULATED.3IONS-SCNC: 7 MMOL/L
AST SERPL W P-5'-P-CCNC: 15 U/L (ref 13–39)
BILIRUB SERPL-MCNC: 0.92 MG/DL (ref 0.2–1)
BUN SERPL-MCNC: 13 MG/DL (ref 5–25)
CALCIUM SERPL-MCNC: 9.4 MG/DL (ref 8.4–10.2)
CHLORIDE SERPL-SCNC: 107 MMOL/L (ref 96–108)
CO2 SERPL-SCNC: 28 MMOL/L (ref 21–32)
CREAT SERPL-MCNC: 0.64 MG/DL (ref 0.6–1.3)
GFR SERPL CREATININE-BSD FRML MDRD: 104 ML/MIN/1.73SQ M
GLUCOSE P FAST SERPL-MCNC: 118 MG/DL (ref 65–99)
POTASSIUM SERPL-SCNC: 3.9 MMOL/L (ref 3.5–5.3)
PROT SERPL-MCNC: 7.2 G/DL (ref 6.4–8.4)
SODIUM SERPL-SCNC: 142 MMOL/L (ref 135–147)

## 2024-02-19 PROCEDURE — 36415 COLL VENOUS BLD VENIPUNCTURE: CPT

## 2024-02-19 PROCEDURE — 80053 COMPREHEN METABOLIC PANEL: CPT

## 2024-02-19 NOTE — TELEPHONE ENCOUNTER
Patients GI provider: TESS Scott    Number to return call: [phone]      Reason for call:Marisela from Radiology called in stating pt will need lab work prior to the Ct Scan scheduled for 2/20/2024. Please reach out to patient, thank you.      Scheduled procedure/appointment date if applicable: Apt/procedure 05/08/2024

## 2024-02-19 NOTE — TELEPHONE ENCOUNTER
I called and lmom advising pt to complete blood work ordered on 2-7-24 a few days before imaging, or her imaging appt will be cancelled

## 2024-02-19 NOTE — TELEPHONE ENCOUNTER
That blood work was already ordered on 2/7 by the provider. Please let the pt know to do it. Thanks

## 2024-02-20 ENCOUNTER — HOSPITAL ENCOUNTER (OUTPATIENT)
Dept: RADIOLOGY | Facility: HOSPITAL | Age: 49
Discharge: HOME | End: 2024-02-20
Payer: COMMERCIAL

## 2024-02-20 DIAGNOSIS — R14.0 ABDOMINAL DISTENTION: ICD-10-CM

## 2024-02-20 DIAGNOSIS — R14.0 ABDOMINAL BLOATING: ICD-10-CM

## 2024-02-20 PROCEDURE — G1004 CDSM NDSC: HCPCS

## 2024-02-20 PROCEDURE — 74177 CT ABD & PELVIS W/CONTRAST: CPT

## 2024-02-20 RX ADMIN — IOHEXOL 100 ML: 350 INJECTION, SOLUTION INTRAVENOUS at 15:59

## 2024-02-27 DIAGNOSIS — E11.9 TYPE 2 DIABETES MELLITUS WITHOUT COMPLICATION, WITHOUT LONG-TERM CURRENT USE OF INSULIN (HCC): ICD-10-CM

## 2024-02-27 DIAGNOSIS — K64.9 HEMORRHOIDS, UNSPECIFIED HEMORRHOID TYPE: ICD-10-CM

## 2024-02-27 RX ORDER — ATORVASTATIN CALCIUM 10 MG/1
10 TABLET, FILM COATED ORAL DAILY
Qty: 90 TABLET | Refills: 0 | Status: SHIPPED | OUTPATIENT
Start: 2024-02-27

## 2024-02-27 RX ORDER — HYDROCORTISONE 25 MG/G
CREAM TOPICAL 2 TIMES DAILY
Qty: 28 G | Refills: 0 | Status: SHIPPED | OUTPATIENT
Start: 2024-02-27

## 2024-02-27 RX ORDER — METFORMIN HYDROCHLORIDE 500 MG/1
500 TABLET, EXTENDED RELEASE ORAL
Qty: 180 TABLET | Refills: 0 | Status: SHIPPED | OUTPATIENT
Start: 2024-02-27 | End: 2025-02-21

## 2024-04-15 NOTE — PROGRESS NOTES
Assessment:  1. Cervical radiculopathy    2. Chronic pain syndrome    3. Spondylosis of cervical spine without myelopathy    4. Neck pain    5. Myofascial pain syndrome        Plan:  The patient is a 49-year-old female with a history of chronic pain secondary to neck pain, cervical radiculopathy, cervical spinal stenosis and myofascial pain who presents to the office with worsening neck pain and pain that radiates into the left upper extremity.    MRI of cervical spine done on 11/26/2023 was reviewed with the patient and shows multilevel degenerative disc disease with multilevel foraminal narrowing, moderate to severe left C5-6 and C6-7 which is likely causing her symptoms into the left upper extremity.  I did instruct patient we can perform a cervical epidural steroid injection to help decrease inflammation and provide her relief.  Patient would like to proceed.  I instructed our  will schedule her.  Complete risks and benefits including bleeding, infection, tissue reaction, nerve injury and allergic reaction were discussed.  The approach was demonstrated using models and literature was provided.  Verbal and written consent was obtained.    My impressions and treatment recommendations were discussed in detail with the patient who verbalized understanding and had no further questions.  Discharge instructions were provided. I personally saw and examined the patient and I agree with the above discussed plan of care.    Orders Placed This Encounter   Procedures    FL spine and pain procedure     Dr Torre     Standing Status:   Future     Standing Expiration Date:   4/16/2028     Order Specific Question:   Reason for Exam:     Answer:   SULTANA     Order Specific Question:   Is the patient pregnant?     Answer:   No     Order Specific Question:   Anticoagulant hold needed?     Answer:   No     No orders of the defined types were placed in this encounter.      History of Present Illness:  Triny Torre is a 49  y.o. female with a history of chronic pain secondary to neck pain, cervical radiculopathy, cervical spinal stenosis and myofascial pain.  She was last seen on 10/25/2021 where she was ordered an MRI of her cervical spine.  She has also followed with neurosurgery who recommended no surgery at this time.  She presents to the office with worsening neck pain and pain that radiates into the left upper extremity.    She states her pain is worse since the last office visit and constant but worse at night.  She rates quality of her pain is burning, dull/aching, numbness, pins/needles and is currently rating her pain a 10/10 on a numeric scale.    She is not currently taking anything for pain.  She states she has been utilizing a heating pad which is helpful as well as massage therapy which helps with the myofascial component of her pain.    I have personally reviewed and/or updated the patient's past medical history, past surgical history, family history, social history, current medications, allergies, and vital signs today.     Review of Systems   Respiratory:  Negative for shortness of breath.    Cardiovascular:  Negative for chest pain.   Gastrointestinal:  Negative for constipation, diarrhea, nausea and vomiting.   Musculoskeletal:  Negative for arthralgias, gait problem, joint swelling and myalgias.   Skin:  Negative for rash.   Neurological:  Negative for dizziness, seizures and weakness.   All other systems reviewed and are negative.      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  Treated for 1 month and got side effects.  , declines further treatment.  Prefers to have a chest x-ray needed for her employment.    Migraine without aura and without status migrainosus, not intractable 10/03/2019    Type 2 diabetes mellitus without complication, without long-term current use of insulin (HCC) 2021       Past Surgical History:   Procedure Laterality Date     SECTION      CHOLECYSTECTOMY      TOTAL ABDOMINAL HYSTERECTOMY      Due to fibroids. Ovaries present       Family History   Problem Relation Age of Onset    Diabetes Mother     Hypertension Mother     Heart attack Mother     Stroke Father     No Known Problems Daughter     No Known Problems Maternal Grandmother     No Known Problems Maternal Grandfather     No Known Problems Paternal Grandmother     No Known Problems Paternal Grandfather     No Known Problems Maternal Aunt     No Known Problems Maternal Aunt     No Known Problems Maternal Aunt     No Known Problems Paternal Aunt     No Known Problems Paternal Aunt     No Known Problems Paternal Aunt     No Known Problems Paternal Aunt     No Known Problems Paternal Aunt        Social History     Occupational History    Not on file   Tobacco Use    Smoking status: Never    Smokeless tobacco: Never   Vaping Use    Vaping status: Never Used   Substance and Sexual Activity    Alcohol use: No    Drug use: No    Sexual activity: Yes       Current  Outpatient Medications on File Prior to Visit   Medication Sig    acetaminophen (TYLENOL) 500 mg tablet Take 500 mg by mouth as needed      atorvastatin (LIPITOR) 10 mg tablet Take 1 tablet (10 mg total) by mouth daily    clotrimazole-betamethasone (LOTRISONE) 1-0.05 % cream Apply topically 2 (two) times a day (Patient not taking: Reported on 11/2/2023)    ergocalciferol (ERGOCALCIFEROL) 1.25 MG (06520 UT) capsule Take 1 tablet by mouth once a week (Patient not taking: Reported on 11/2/2023)    fluticasone (FLONASE) 50 mcg/act nasal spray 1 spray into each nostril daily    FREESTYLE LITE test strip USE TO TEST BLOOD SUGAR AS DIRECTED (Patient not taking: Reported on 11/29/2023)    hydrocortisone (ANUSOL-HC) 2.5 % rectal cream Apply topically 2 (two) times a day    metFORMIN (GLUCOPHAGE-XR) 500 mg 24 hr tablet Take 1 tablet (500 mg total) by mouth daily with breakfast    olopatadine (PATANOL) 0.1 % ophthalmic solution Administer 1 drop to both eyes 2 (two) times a day (Patient not taking: Reported on 2/7/2024)    triamcinolone (KENALOG) 0.147 MG/GM topical spray Apply topically 2 (two) times a day (Patient not taking: Reported on 11/2/2023)     No current facility-administered medications on file prior to visit.       Allergies   Allergen Reactions    Bee Pollen Itching     Itchy, runny nose    Dust Mite Extract Itching     Itchy, runny nose    Pollen Extract Itching     Itchy, runny nose       Physical Exam:    /67   Pulse 82   Wt 80.7 kg (178 lb)   BMI 28.73 kg/m²     Constitutional:normal, well developed, well nourished, alert, in no distress and non-toxic and no overt pain behavior.  Eyes:anicteric  HEENT:grossly intact  Neck:supple, symmetric, trachea midline and no masses   Pulmonary:even and unlabored  Cardiovascular:No edema or pitting edema present  Skin:Normal without rashes or lesions and well hydrated  Psychiatric:Mood and affect appropriate  Neurologic:Cranial Nerves II-XII grossly  intact  Musculoskeletal:normal    Cervical Spine Exam    Appearance:  Normal lordosis  Palpation/Tenderness:  left cervical paraspinal tenderness  left trapezium tenderness  Sensory:  no sensory deficits noted  Range of Motion:  Flexion:  Minimally limited  with pain  Extension:  Minimally limited  with pain  Rotation - Left:  Minimally limited  with pain  Rotation - Right:  Minimally limited  with pain  Motor Strength:  Left Arm Flexion  5/5  Left Arm Extension  5/5  Right Arm Flexion  5/5  Right Arm Extension  5/5  Left    5/5  Right   5/5      Imaging

## 2024-04-16 ENCOUNTER — OFFICE VISIT (OUTPATIENT)
Dept: PAIN MEDICINE | Facility: CLINIC | Age: 49
End: 2024-04-16
Payer: COMMERCIAL

## 2024-04-16 VITALS
HEART RATE: 82 BPM | SYSTOLIC BLOOD PRESSURE: 115 MMHG | DIASTOLIC BLOOD PRESSURE: 67 MMHG | WEIGHT: 178 LBS | BODY MASS INDEX: 28.73 KG/M2

## 2024-04-16 DIAGNOSIS — M79.18 MYOFASCIAL PAIN SYNDROME: ICD-10-CM

## 2024-04-16 DIAGNOSIS — M54.12 CERVICAL RADICULOPATHY: Primary | ICD-10-CM

## 2024-04-16 DIAGNOSIS — M54.2 NECK PAIN: ICD-10-CM

## 2024-04-16 DIAGNOSIS — G89.4 CHRONIC PAIN SYNDROME: ICD-10-CM

## 2024-04-16 DIAGNOSIS — M47.812 SPONDYLOSIS OF CERVICAL SPINE WITHOUT MYELOPATHY: ICD-10-CM

## 2024-04-16 PROCEDURE — 99214 OFFICE O/P EST MOD 30 MIN: CPT

## 2024-05-08 ENCOUNTER — OFFICE VISIT (OUTPATIENT)
Dept: FAMILY MEDICINE CLINIC | Facility: CLINIC | Age: 49
End: 2024-05-08
Payer: COMMERCIAL

## 2024-05-08 VITALS
OXYGEN SATURATION: 97 % | HEIGHT: 66 IN | BODY MASS INDEX: 28.77 KG/M2 | HEART RATE: 84 BPM | TEMPERATURE: 97.8 F | DIASTOLIC BLOOD PRESSURE: 68 MMHG | SYSTOLIC BLOOD PRESSURE: 104 MMHG | WEIGHT: 179 LBS | RESPIRATION RATE: 16 BRPM

## 2024-05-08 DIAGNOSIS — E11.9 TYPE 2 DIABETES MELLITUS WITHOUT COMPLICATION, WITHOUT LONG-TERM CURRENT USE OF INSULIN (HCC): ICD-10-CM

## 2024-05-08 DIAGNOSIS — K76.0 FATTY LIVER DISEASE, NONALCOHOLIC: ICD-10-CM

## 2024-05-08 DIAGNOSIS — M47.812 SPONDYLOSIS OF CERVICAL SPINE WITHOUT MYELOPATHY: ICD-10-CM

## 2024-05-08 DIAGNOSIS — Z00.00 HEALTH MAINTENANCE EXAMINATION: Primary | ICD-10-CM

## 2024-05-08 DIAGNOSIS — H60.549 ECZEMA OF EXTERNAL EAR, UNSPECIFIED LATERALITY: ICD-10-CM

## 2024-05-08 DIAGNOSIS — M54.12 CERVICAL RADICULOPATHY: ICD-10-CM

## 2024-05-08 DIAGNOSIS — J30.2 SEASONAL ALLERGIES: ICD-10-CM

## 2024-05-08 DIAGNOSIS — E78.1 HYPERTRIGLYCERIDEMIA: ICD-10-CM

## 2024-05-08 PROBLEM — F41.9 ANXIETY: Status: ACTIVE | Noted: 2024-05-08

## 2024-05-08 LAB — SL AMB POCT HEMOGLOBIN AIC: 7.2 (ref ?–6.5)

## 2024-05-08 PROCEDURE — 83036 HEMOGLOBIN GLYCOSYLATED A1C: CPT | Performed by: FAMILY MEDICINE

## 2024-05-08 PROCEDURE — 99214 OFFICE O/P EST MOD 30 MIN: CPT | Performed by: FAMILY MEDICINE

## 2024-05-08 PROCEDURE — 99396 PREV VISIT EST AGE 40-64: CPT | Performed by: FAMILY MEDICINE

## 2024-05-08 RX ORDER — METFORMIN HYDROCHLORIDE 500 MG/1
500 TABLET, EXTENDED RELEASE ORAL
Qty: 180 TABLET | Refills: 3 | Status: SHIPPED | OUTPATIENT
Start: 2024-05-08 | End: 2025-05-03

## 2024-05-08 RX ORDER — OLOPATADINE HYDROCHLORIDE 1 MG/ML
1 SOLUTION/ DROPS OPHTHALMIC 2 TIMES DAILY
Qty: 5 ML | Refills: 5 | Status: SHIPPED | OUTPATIENT
Start: 2024-05-08

## 2024-05-08 RX ORDER — MOMETASONE FUROATE 1 MG/ML
SOLUTION TOPICAL DAILY
Qty: 60 ML | Refills: 5 | Status: SHIPPED | OUTPATIENT
Start: 2024-05-08

## 2024-05-08 NOTE — LETTER
May 8, 2024     Patient: Triny Torre  YOB: 1975  Date of Visit: 5/8/2024      To Whom it May Concern:    Triny Torre is under my professional care. Triny was seen in my office on 5/8/2024. Triny Agosto  has anxiety about driving at nighttime.  Request that she not be asked to drive at nighttime to work.      If you have any questions or concerns, please don't hesitate to call.         Sincerely,          Marco Wolfe MD        CC: No Recipients

## 2024-05-08 NOTE — PATIENT INSTRUCTIONS
Health maintenance is performed.  Colonoscopy up-to-date.  She has a prescription for mammogram.  A1c is 7.2.  Increase metformin 500 mg to 2 tablets daily.  Continue 10 mg of atorvastatin.  For itchy eyes, Patanol 1 drop each eye twice daily.  Continue Zyrtec and Flonase.  Mometasone lotion can be used to ear canal as needed for itching.  Note given to excuse her from driving at nighttime because of anxiety driving.  Also given work excuse as she missed the day from a migraine headache.  Discussion of the pinched nerve in her neck causing pain in her left arm.  Seems to get some relief from  massage but would be a candidate for an epidural steroid injection which she has some concerns about.  Recheck in 4 months for follow-up of diabetes.  Wellness Visit for Adults   AMBULATORY CARE:   A wellness visit  is when you see your healthcare provider to get screened for health problems. Your healthcare provider will also give you advice on how to stay healthy. Write down your questions so you remember to ask them. Ask your healthcare provider how often you should have a wellness visit.  What happens at a wellness visit:  Your healthcare provider will ask about your health, and your family history of health problems. This includes high blood pressure, heart disease, and cancer. He or she will ask if you have symptoms that concern you, if you smoke, and about your mood. You may also be asked about your intake of medicines, supplements, food, and alcohol. Any of the following may be done:  Your weight  will be checked. Your height may also be checked so your body mass index (BMI) can be calculated. Your BMI shows if you are at a healthy weight.    Your blood pressure  and heart rate will be checked. Your temperature may also be checked.    Blood and urine tests  may be done. Blood tests may be done to check your cholesterol levels. Abnormal cholesterol levels increase your risk for heart disease and stroke. You may  also need a blood or urine test to check for diabetes if you are at increased risk. Urine tests may be done to look for signs of an infection or kidney disease.    A physical exam  includes checking your heartbeat and lungs with a stethoscope. Your healthcare provider may also check your skin to look for sun damage.    Screening tests  may be recommended. A screening test is done to check for diseases that may not cause symptoms. The screening tests you may need depend on your age, gender, family history, and lifestyle habits. For example, colorectal screening may be recommended if you are 50 years old or older.    Screening tests you need if you are a woman:   A Pap smear  is used to screen for cervical cancer. Pap smears are usually done every 3 to 5 years depending on your age. You may need them more often if you have had abnormal Pap smear test results in the past. Ask your healthcare provider how often you should have a Pap smear.    A mammogram  is an x-ray of your breasts to screen for breast cancer. Experts recommend mammograms every 2 years starting at age 50 years. You may need a mammogram at age 49 years or younger if you have an increased risk for breast cancer. Talk to your healthcare provider about when you should start having mammograms and how often you need them.    Vaccines you may need:   Get an influenza vaccine  every year. The influenza vaccine protects you from the flu. Several types of viruses cause the flu. The viruses change over time, so new vaccines are made each year.    Get a tetanus-diphtheria (Td) booster vaccine  every 10 years. This vaccine protects you against tetanus and diphtheria. Tetanus is a severe infection that may cause painful muscle spasms and lockjaw. Diphtheria is a severe bacterial infection that causes a thick covering in the back of your mouth and throat.    Get a human papillomavirus (HPV) vaccine  if you are female and aged 19 to 26 or male 19 to 21 and never  received it. This vaccine protects you from HPV infection. HPV is the most common infection spread by sexual contact. HPV may also cause vaginal, penile, and anal cancers.    Get a pneumococcal vaccine  if you are aged 65 years or older. The pneumococcal vaccine is an injection given to protect you from pneumococcal disease. Pneumococcal disease is an infection caused by pneumococcal bacteria. The infection may cause pneumonia, meningitis, or an ear infection.    Get a shingles vaccine  if you are 60 or older, even if you have had shingles before. The shingles vaccine is an injection to protect you from the varicella-zoster virus. This is the same virus that causes chickenpox. Shingles is a painful rash that develops in people who had chickenpox or have been exposed to the virus.    How to eat healthy:  My Plate is a model for planning healthy meals. It shows the types and amounts of foods that should go on your plate. Fruits and vegetables make up about half of your plate, and grains and protein make up the other half. A serving of dairy is included on the side of your plate. The amount of calories and serving sizes you need depends on your age, gender, weight, and height. Examples of healthy foods are listed below:  Eat a variety of vegetables  such as dark green, red, and orange vegetables. You can also include canned vegetables low in sodium (salt) and frozen vegetables without added butter or sauces.    Eat a variety of fresh fruits , canned fruit in 100% juice, frozen fruit, and dried fruit.    Include whole grains.  At least half of the grains you eat should be whole grains. Examples include whole-wheat bread, wheat pasta, brown rice, and whole-grain cereals such as oatmeal.    Eat a variety of protein foods such as seafood (fish and shellfish), lean meat, and poultry without skin (turkey and chicken). Examples of lean meats include pork leg, shoulder, or tenderloin, and beef round, sirloin, tenderloin, and  extra lean ground beef. Other protein foods include eggs and egg substitutes, beans, peas, soy products, nuts, and seeds.    Choose low-fat dairy products such as skim or 1% milk or low-fat yogurt, cheese, and cottage cheese.    Limit unhealthy fats  such as butter, hard margarine, and shortening.       Exercise:  Exercise at least 30 minutes per day on most days of the week. Some examples of exercise include walking, biking, dancing, and swimming. You can also fit in more physical activity by taking the stairs instead of the elevator or parking farther away from stores. Include muscle strengthening activities 2 days each week. Regular exercise provides many health benefits. It helps you manage your weight, and decreases your risk for type 2 diabetes, heart disease, stroke, and high blood pressure. Exercise can also help improve your mood. Ask your healthcare provider about the best exercise plan for you.       General health and safety guidelines:   Do not smoke.  Nicotine and other chemicals in cigarettes and cigars can cause lung damage. Ask your healthcare provider for information if you currently smoke and need help to quit. E-cigarettes or smokeless tobacco still contain nicotine. Talk to your healthcare provider before you use these products.    Limit alcohol.  A drink of alcohol is 12 ounces of beer, 5 ounces of wine, or 1½ ounces of liquor.    Lose weight, if needed.  Being overweight increases your risk of certain health conditions. These include heart disease, high blood pressure, type 2 diabetes, and certain types of cancer.    Protect your skin.  Do not sunbathe or use tanning beds. Use sunscreen with a SPF 15 or higher. Apply sunscreen at least 15 minutes before you go outside. Reapply sunscreen every 2 hours. Wear protective clothing, hats, and sunglasses when you are outside.    Drive safely.  Always wear your seatbelt. Make sure everyone in your car wears a seatbelt. A seatbelt can save your life if  you are in an accident. Do not use your cell phone when you are driving. This could distract you and cause an accident. Pull over if you need to make a call or send a text message.    Practice safe sex.  Use latex condoms if are sexually active and have more than one partner. Your healthcare provider may recommend screening tests for sexually transmitted infections (STIs).    Wear helmets, lifejackets, and protective gear.  Always wear a helmet when you ride a bike or motorcycle, go skiing, or play sports that could cause a head injury. Wear protective equipment when you play sports. Wear a lifejacket when you are on a boat or doing water sports.    © Copyright [employer] 2023 Information is for End User's use only and may not be sold, redistributed or otherwise used for commercial purposes.  The above information is an  only. It is not intended as medical advice for individual conditions or treatments. Talk to your doctor, nurse or pharmacist before following any medical regimen to see if it is safe and effective for you.

## 2024-05-08 NOTE — PROGRESS NOTES
Chief Complaint   Patient presents with    Headache        HPI   Here for follow-up of diabetes and hyperlipidemia.  Has been bothered by pain in her left trapezius and shoulder for a number of months.  Evaluated by neurosurgery and orthopedics.  Ultimately had an MRI of her cervical spine which showed significant generative disease with foraminal stenosis at a couple levels.  An epidural steroid injection was recently suggested he gets relief for 3 to 4 days when her  gives her a good in the past, was tried on a muscle relaxer and gabapentin which did not seem to make a difference.  Has been taking 500 mg of metformin for her blood sugar.  Today's A1c is 7.2.  Previously was 6.81-year ago.  Also on 10 mg of atorvastatin.  Last November, cholesterol 233 with HDL 49, .  Triglycerides 282.  Chemistry and thyroid were normal.  Urine protein normal.    Other complaints include itchy eyes and itchy ear canals.  Does have Zyrtec.  Has anxiety about driving at night.  Would like a note for work precluding her from driving.        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  Treated for 1 month and got side effects.  , declines further treatment.  Prefers to have a chest x-ray needed for her employment.    Migraine without aura and without status migrainosus, not intractable 10/03/2019    Type 2 diabetes mellitus without complication, without long-term current use of insulin (HCC) 2021        Past Surgical History:   Procedure Laterality Date      "SECTION      CHOLECYSTECTOMY      TOTAL ABDOMINAL HYSTERECTOMY      Due to fibroids. Ovaries present       Social History     Tobacco Use    Smoking status: Never    Smokeless tobacco: Never   Substance Use Topics    Alcohol use: No       Social History     Social History Narrative     since 1996. 3 children. 23,20, 18. 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      works for the [employer].     Enjoys cooking.         The following portions of the patient's history were reviewed and updated as appropriate: allergies, current medications, past family history, past medical history, past social history, past surgical history, and problem list.      Review of Systems       /68 (BP Location: Left arm, Patient Position: Sitting, Cuff Size: Large)   Pulse 84   Temp 97.8 °F (36.6 °C) (Temporal)   Resp 16   Ht 5' 6\" (1.676 m)   Wt 81.2 kg (179 lb)   SpO2 97%   BMI 28.89 kg/m²      Physical Exam  Cardiovascular:      Pulses: no weak pulses.           Dorsalis pedis pulses are 1+ on the right side and 1+ on the left side.   Feet:      Right foot:      Skin integrity: No ulcer, skin breakdown, erythema, warmth, callus or dry skin.      Left foot:      Skin integrity: No ulcer, skin breakdown, erythema, warmth, callus or dry skin.        Healthy appearing individual in no acute distress.  Extraocular motions are intact.  Both ear drums are white.  Hearing is grossly intact.  Throat reveals no erythema.  Teeth are in good repair.  No neck nodes or thyromegaly.  Lungs are clear.  Heart regular with no murmurs or gallops.  Abdomen is soft and nontender.  No leg edema.  Skin reveals no apparent rash.  Neurologic grossly within normal limits.  Normal mood and affect.  Musculoskeletal exam grossly within normal limits.  A1c 7.2.    Patient's shoes and socks removed.    Right Foot/Ankle   Right Foot Inspection  Skin Exam: skin normal and skin intact. No dry skin, no warmth, no " callus, no erythema, no maceration, no abnormal color, no pre-ulcer, no ulcer and no callus.     Sensory   Monofilament testing: intact    Vascular  The right DP pulse is 1+.     Left Foot/Ankle  Left Foot Inspection  Skin Exam: skin normal and skin intact. No dry skin, no warmth, no erythema, no maceration, normal color, no pre-ulcer, no ulcer and no callus.     Sensory   Monofilament testing: intact    Vascular  The left DP pulse is 1+.     Assign Risk Category  No deformity present  No loss of protective sensation  No weak pulses  Risk: 0                 Current Outpatient Medications:     acetaminophen (TYLENOL) 500 mg tablet, Take 500 mg by mouth as needed  , Disp: , Rfl:     atorvastatin (LIPITOR) 10 mg tablet, Take 1 tablet (10 mg total) by mouth daily, Disp: 90 tablet, Rfl: 0    ergocalciferol (ERGOCALCIFEROL) 1.25 MG (39769 UT) capsule, Take 1 tablet by mouth once a week, Disp: , Rfl:     fluticasone (FLONASE) 50 mcg/act nasal spray, 1 spray into each nostril daily, Disp: 48 g, Rfl: 3    FREESTYLE LITE test strip, USE TO TEST BLOOD SUGAR AS DIRECTED, Disp: 100 each, Rfl: 5    hydrocortisone (ANUSOL-HC) 2.5 % rectal cream, Apply topically 2 (two) times a day, Disp: 28 g, Rfl: 0    metFORMIN (GLUCOPHAGE-XR) 500 mg 24 hr tablet, Take 1 tablet (500 mg total) by mouth daily with breakfast, Disp: 180 tablet, Rfl: 3    mometasone (ELOCON) 0.1 % lotion, Apply topically daily Apply 4 drops to ear canal as needed for itching, Disp: 60 mL, Rfl: 5    olopatadine (PATANOL) 0.1 % ophthalmic solution, Administer 1 drop to both eyes 2 (two) times a day, Disp: 5 mL, Rfl: 5     No problem-specific Assessment & Plan notes found for this encounter.       Diagnoses and all orders for this visit:    Health maintenance examination    Type 2 diabetes mellitus without complication, without long-term current use of insulin (HCC)  -     metFORMIN (GLUCOPHAGE-XR) 500 mg 24 hr tablet; Take 1 tablet (500 mg total) by mouth daily with  breakfast    Hypertriglyceridemia    Spondylosis of cervical spine without myelopathy    Fatty liver disease, nonalcoholic    Cervical radiculopathy    Seasonal allergies  -     olopatadine (PATANOL) 0.1 % ophthalmic solution; Administer 1 drop to both eyes 2 (two) times a day    Eczema of external ear, unspecified laterality  -     mometasone (ELOCON) 0.1 % lotion; Apply topically daily Apply 4 drops to ear canal as needed for itching        Patient Instructions   Health maintenance is performed.  Colonoscopy up-to-date.  She has a prescription for mammogram.  A1c is 7.2.  Increase metformin 500 mg to 2 tablets daily.  Continue 10 mg of atorvastatin.  For itchy eyes, Patanol 1 drop each eye twice daily.  Continue Zyrtec and Flonase.  Mometasone lotion can be used to ear canal as needed for itching.  Note given to excuse her from driving at nighttime because of anxiety driving.  Also given work excuse as she missed the day from a migraine headache.  Discussion of the pinched nerve in her neck causing pain in her left arm.  Seems to get some relief from  massage but would be a candidate for an epidural steroid injection which she has some concerns about.  Recheck in 4 months for follow-up of diabetes.  Wellness Visit for Adults   AMBULATORY CARE:   A wellness visit  is when you see your healthcare provider to get screened for health problems. Your healthcare provider will also give you advice on how to stay healthy. Write down your questions so you remember to ask them. Ask your healthcare provider how often you should have a wellness visit.  What happens at a wellness visit:  Your healthcare provider will ask about your health, and your family history of health problems. This includes high blood pressure, heart disease, and cancer. He or she will ask if you have symptoms that concern you, if you smoke, and about your mood. You may also be asked about your intake of medicines, supplements, food, and alcohol.  Any of the following may be done:  Your weight  will be checked. Your height may also be checked so your body mass index (BMI) can be calculated. Your BMI shows if you are at a healthy weight.    Your blood pressure  and heart rate will be checked. Your temperature may also be checked.    Blood and urine tests  may be done. Blood tests may be done to check your cholesterol levels. Abnormal cholesterol levels increase your risk for heart disease and stroke. You may also need a blood or urine test to check for diabetes if you are at increased risk. Urine tests may be done to look for signs of an infection or kidney disease.    A physical exam  includes checking your heartbeat and lungs with a stethoscope. Your healthcare provider may also check your skin to look for sun damage.    Screening tests  may be recommended. A screening test is done to check for diseases that may not cause symptoms. The screening tests you may need depend on your age, gender, family history, and lifestyle habits. For example, colorectal screening may be recommended if you are 50 years old or older.    Screening tests you need if you are a woman:   A Pap smear  is used to screen for cervical cancer. Pap smears are usually done every 3 to 5 years depending on your age. You may need them more often if you have had abnormal Pap smear test results in the past. Ask your healthcare provider how often you should have a Pap smear.    A mammogram  is an x-ray of your breasts to screen for breast cancer. Experts recommend mammograms every 2 years starting at age 50 years. You may need a mammogram at age 49 years or younger if you have an increased risk for breast cancer. Talk to your healthcare provider about when you should start having mammograms and how often you need them.    Vaccines you may need:   Get an influenza vaccine  every year. The influenza vaccine protects you from the flu. Several types of viruses cause the flu. The viruses change over  time, so new vaccines are made each year.    Get a tetanus-diphtheria (Td) booster vaccine  every 10 years. This vaccine protects you against tetanus and diphtheria. Tetanus is a severe infection that may cause painful muscle spasms and lockjaw. Diphtheria is a severe bacterial infection that causes a thick covering in the back of your mouth and throat.    Get a human papillomavirus (HPV) vaccine  if you are female and aged 19 to 26 or male 19 to 21 and never received it. This vaccine protects you from HPV infection. HPV is the most common infection spread by sexual contact. HPV may also cause vaginal, penile, and anal cancers.    Get a pneumococcal vaccine  if you are aged 65 years or older. The pneumococcal vaccine is an injection given to protect you from pneumococcal disease. Pneumococcal disease is an infection caused by pneumococcal bacteria. The infection may cause pneumonia, meningitis, or an ear infection.    Get a shingles vaccine  if you are 60 or older, even if you have had shingles before. The shingles vaccine is an injection to protect you from the varicella-zoster virus. This is the same virus that causes chickenpox. Shingles is a painful rash that develops in people who had chickenpox or have been exposed to the virus.    How to eat healthy:  My Plate is a model for planning healthy meals. It shows the types and amounts of foods that should go on your plate. Fruits and vegetables make up about half of your plate, and grains and protein make up the other half. A serving of dairy is included on the side of your plate. The amount of calories and serving sizes you need depends on your age, gender, weight, and height. Examples of healthy foods are listed below:  Eat a variety of vegetables  such as dark green, red, and orange vegetables. You can also include canned vegetables low in sodium (salt) and frozen vegetables without added butter or sauces.    Eat a variety of fresh fruits , canned fruit in 100%  juice, frozen fruit, and dried fruit.    Include whole grains.  At least half of the grains you eat should be whole grains. Examples include whole-wheat bread, wheat pasta, brown rice, and whole-grain cereals such as oatmeal.    Eat a variety of protein foods such as seafood (fish and shellfish), lean meat, and poultry without skin (turkey and chicken). Examples of lean meats include pork leg, shoulder, or tenderloin, and beef round, sirloin, tenderloin, and extra lean ground beef. Other protein foods include eggs and egg substitutes, beans, peas, soy products, nuts, and seeds.    Choose low-fat dairy products such as skim or 1% milk or low-fat yogurt, cheese, and cottage cheese.    Limit unhealthy fats  such as butter, hard margarine, and shortening.       Exercise:  Exercise at least 30 minutes per day on most days of the week. Some examples of exercise include walking, biking, dancing, and swimming. You can also fit in more physical activity by taking the stairs instead of the elevator or parking farther away from stores. Include muscle strengthening activities 2 days each week. Regular exercise provides many health benefits. It helps you manage your weight, and decreases your risk for type 2 diabetes, heart disease, stroke, and high blood pressure. Exercise can also help improve your mood. Ask your healthcare provider about the best exercise plan for you.       General health and safety guidelines:   Do not smoke.  Nicotine and other chemicals in cigarettes and cigars can cause lung damage. Ask your healthcare provider for information if you currently smoke and need help to quit. E-cigarettes or smokeless tobacco still contain nicotine. Talk to your healthcare provider before you use these products.    Limit alcohol.  A drink of alcohol is 12 ounces of beer, 5 ounces of wine, or 1½ ounces of liquor.    Lose weight, if needed.  Being overweight increases your risk of certain health conditions. These include heart  disease, high blood pressure, type 2 diabetes, and certain types of cancer.    Protect your skin.  Do not sunbathe or use tanning beds. Use sunscreen with a SPF 15 or higher. Apply sunscreen at least 15 minutes before you go outside. Reapply sunscreen every 2 hours. Wear protective clothing, hats, and sunglasses when you are outside.    Drive safely.  Always wear your seatbelt. Make sure everyone in your car wears a seatbelt. A seatbelt can save your life if you are in an accident. Do not use your cell phone when you are driving. This could distract you and cause an accident. Pull over if you need to make a call or send a text message.    Practice safe sex.  Use latex condoms if are sexually active and have more than one partner. Your healthcare provider may recommend screening tests for sexually transmitted infections (STIs).    Wear helmets, lifejackets, and protective gear.  Always wear a helmet when you ride a bike or motorcycle, go skiing, or play sports that could cause a head injury. Wear protective equipment when you play sports. Wear a lifejacket when you are on a boat or doing water sports.    © Copyright [employer] 2023 Information is for End User's use only and may not be sold, redistributed or otherwise used for commercial purposes.  The above information is an  only. It is not intended as medical advice for individual conditions or treatments. Talk to your doctor, nurse or pharmacist before following any medical regimen to see if it is safe and effective for you.

## 2024-05-08 NOTE — LETTER
May 8, 2024     Patient: Triny Torre  YOB: 1975  Date of Visit: 5/8/2024      To Whom it May Concern:    Triny Torre is under my professional care. Triny was seen in my office on 5/8/2024. Triny .  Please excuse from work on May 3 secondary to migraine headache.    If you have any questions or concerns, please don't hesitate to call.         Sincerely,          Marco Wolfe MD        CC: No Recipients

## 2024-05-16 ENCOUNTER — OFFICE VISIT (OUTPATIENT)
Dept: FAMILY MEDICINE CLINIC | Facility: CLINIC | Age: 49
End: 2024-05-16
Payer: COMMERCIAL

## 2024-05-16 VITALS
TEMPERATURE: 98 F | WEIGHT: 178 LBS | HEIGHT: 66 IN | BODY MASS INDEX: 28.61 KG/M2 | SYSTOLIC BLOOD PRESSURE: 110 MMHG | RESPIRATION RATE: 16 BRPM | DIASTOLIC BLOOD PRESSURE: 70 MMHG | OXYGEN SATURATION: 100 % | HEART RATE: 75 BPM

## 2024-05-16 DIAGNOSIS — F41.9 ANXIETY: Primary | ICD-10-CM

## 2024-05-16 PROCEDURE — 99213 OFFICE O/P EST LOW 20 MIN: CPT | Performed by: FAMILY MEDICINE

## 2024-05-16 NOTE — ASSESSMENT & PLAN NOTE
Declines medication. Situational anxiety only. 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 Follow up as needed.

## 2024-05-16 NOTE — PROGRESS NOTES
Ambulatory Visit  Name: Triny Torre      : 1975      MRN: [id number]  Encounter Provider: Jeff Jason DO  Encounter Date: 2024   Encounter department: [facility]    Assessment & Plan   1. Anxiety  Assessment & Plan:  Declines medication. Situational anxiety only. 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 Follow up as needed.     History of Present Illness       HPI  Works [employer] in [place], comfortable driving near home. Her employer was sending her to alternate locations but she has anxiety driving in unfamiliar locations, requesting a note to support her staying at her preferred location. Has some tightness in her left shoulder and back. Sometimes feels chest tightness when she is anxious. Had previous cardiac stress test which was normal in 2018.    Review of Systems   Constitutional:  Negative for fatigue.   Respiratory:  Negative for chest tightness and shortness of breath.    Cardiovascular:  Negative for chest pain and palpitations.   Psychiatric/Behavioral:  The patient is nervous/anxious.      Current Outpatient Medications on File Prior to Visit   Medication Sig Dispense Refill   • acetaminophen (TYLENOL) 500 mg tablet Take 500 mg by mouth as needed       • atorvastatin (LIPITOR) 10 mg tablet Take 1 tablet (10 mg total) by mouth daily 90 tablet 0   • ergocalciferol (ERGOCALCIFEROL) 1.25 MG (23977 UT) capsule Take 1 tablet by mouth once a week     • fluticasone (FLONASE) 50 mcg/act nasal spray 1 spray into each nostril daily 48 g 3   • FREESTYLE LITE test strip USE TO TEST BLOOD SUGAR AS DIRECTED 100 each 5   • hydrocortisone (ANUSOL-HC) 2.5 % rectal cream Apply topically 2 (two) times a day 28 g 0   • metFORMIN (GLUCOPHAGE-XR) 500 mg 24 hr tablet Take 1 tablet (500 mg total) by mouth daily with breakfast 180 tablet 3   • mometasone (ELOCON) 0.1 % lotion Apply topically daily Apply 4 drops  "to ear canal as needed for itching 60 mL 5   • olopatadine (PATANOL) 0.1 % ophthalmic solution Administer 1 drop to both eyes 2 (two) times a day 5 mL 5     No current facility-administered medications on file prior to visit.      Objective     /70 (BP Location: Right arm, Patient Position: Sitting, Cuff Size: Large)   Pulse 75   Temp 98 °F (36.7 °C) (Temporal)   Resp 16   Ht 5' 6\" (1.676 m)   Wt 80.7 kg (178 lb)   SpO2 100%   BMI 28.73 kg/m²     Physical Exam  Vitals reviewed.   Constitutional:       Appearance: Normal appearance.   Cardiovascular:      Rate and Rhythm: Normal rate and regular rhythm.      Heart sounds: Normal heart sounds.   Pulmonary:      Effort: Pulmonary effort is normal.   Skin:     General: Skin is warm and dry.   Neurological:      Mental Status: She is alert.   Psychiatric:         Mood and Affect: Mood normal.         Behavior: Behavior normal.         "

## 2024-05-16 NOTE — LETTER
May 16, 2024     Patient: Triny Torre  YOB: 1975  Date of Visit: 5/16/2024      To Whom it May Concern:    Triny Torre is under my professional care. Triny was seen in my office on 5/16/2024. Triny has anxiety about driving at night on unfamiliar roads. She is able to drive at night but prefers to avoid unfamiliar roads. Please allow her to work at her preferred location in [place] to prevent further anxiety.    If you have any questions or concerns, please don't hesitate to call.         Sincerely,          Jeff Jason,         CC: No Recipients

## 2024-05-20 DIAGNOSIS — E11.9 TYPE 2 DIABETES MELLITUS WITHOUT COMPLICATION, WITHOUT LONG-TERM CURRENT USE OF INSULIN (HCC): ICD-10-CM

## 2024-05-20 RX ORDER — ATORVASTATIN CALCIUM 10 MG/1
10 TABLET, FILM COATED ORAL DAILY
Qty: 90 TABLET | Refills: 1 | Status: SHIPPED | OUTPATIENT
Start: 2024-05-20

## 2024-06-07 NOTE — ED PROVIDER NOTES
[facility]   part of Merged with [facility]  NUTRITION ASSESSMENT    Pt does not meet malnutrition criteria at this time.    NUTRITION INTERVENTION:    Enteral Nutrition - Via PEG, recommend bolus TF: Jevity 1.5 - 480 QID at 0800, 1200, 1600, 2000 or per pt preference.   This will provide 2880 kcal, 122 grams protein, 1459 ml total free water, and 100% of RDI's.   Recommend 175 ml water flush before and after each bolus, TF+FWF provides 2859 ml total fluids.   Discontinue IVF once TF initiated.  Okay to resume home TF regimen of TwoCal 480 ml TID upon discharge.    PATIENT STATUS: 69 year old male admitted on 6/6 presents with aspiration PNA. Pt screened d/t consult for \"TF at home\". Visited pt at bedside. Pt reports he has been tolerating current TF regimen of TwoCal 480 ml TID with 480 ml water flush TID (provides ~2880 kcal, 120 gm pro, 2448 ml free water); does reports flushing more water if he is more active during the day. Denies GI symptoms at baseline but had N/V upon admit. Pt reports he does not take anything PO. Reports his weight 1 week ago was 173 lbs and lost 10 lbs since then. Per chart review, however, pt with ~4 lb wt loss x 1 week (2%). Discussed TwoCal not available and pt agreeable to Jevity 1.5. Also discussed bolus to be QID instead of TID d/t caloric density difference. All questions answered at this time.    PMH: A-fib with tachybrady syndrome status post dual-chamber pacemaker, PEG tube with prior aspiration pneumonia, tonsillary, anal cancer with history of chemoradiation, dysphagia status post PEG tube, HIV and hypothyroidism    ANTHROPOMETRICS:  Ht: 182.9 cm (6')  Wt: 73.9 kg (163 lb).   BMI: Body mass index is 22.11 kg/m².  IBW: 80.9 kg    WEIGHT HISTORY: Pt with ~4 lb wt loss x 1 week (2%, significant per standards).  Patient Weight(s) for the past 336 hrs:   Weight   06/06/24 2155 73.9 kg (163 lb)   06/06/24 2127 74.1 kg (163 lb 5.8 oz)   06/06/24 1455 74.8 kg (165 lb)       Wt Readings  History  Chief Complaint   Patient presents with    Knee Pain     Pt c/o left knee pain  Pt states she sat the wrong way on her knee and now it hurts and was unable to walk after  41-year-old female presents with left knee pain  Patient was sitting [facility] style and felt her left knee pop and she extending the left knee and started to have pain  Patient has pain with ambulation  Pain is 10/10 when ambulating, 6/10 when resting       Knee Pain   Location:  Knee  Time since incident:  5 hours  Injury: yes    Mechanism of injury comment:  Twisted knee  Knee location:  L knee  Pain details:     Quality:  Aching and dull    Radiates to:  Does not radiate    Severity:  Moderate    Onset quality:  Sudden    Duration:  5 hours    Timing:  Constant    Progression:  Unchanged  Chronicity:  New  Dislocation: no    Prior injury to area:  No  Relieved by:  Nothing  Worsened by:  Nothing  Ineffective treatments:  None tried  Associated symptoms: no back pain, no fatigue and no fever        Prior to Admission Medications   Prescriptions Last Dose Informant Patient Reported? Taking?    Cetirizine HCl (ZYRTEC ALLERGY PO) Past Month at Unknown time Self Yes Yes   Sig: Take 1 tablet by mouth as needed    acetaminophen (TYLENOL) 500 mg tablet 1/5/2020 at Unknown time Self Yes Yes   Sig: Take 500 mg by mouth as needed     atorvastatin (LIPITOR) 20 mg tablet 1/5/2020 at Unknown time  No Yes   Sig: Take 1 tablet (20 mg total) by mouth daily at bedtime   cyanocobalamin (VITAMIN B-12) 1,000 mcg tablet 1/6/2020 at Unknown time Self Yes Yes   Sig: Take 2,000 mcg by mouth daily   ergocalciferol (VITAMIN D2) 50,000 units 1/6/2020 at Unknown time  No Yes   Sig: Take 1 capsule (50,000 Units total) by mouth once a week   gabapentin (NEURONTIN) 100 mg capsule 1/5/2020 at Unknown time  No Yes   Sig: Take 3 capsules (300 mg total) by mouth daily at bedtime   indomethacin (INDOCIN) 50 mg capsule Past Month at Unknown time  No Yes   Sig: At the from Last 10 Encounters:   06/06/24 73.9 kg (163 lb)   06/30/23 72.6 kg (160 lb)   05/30/23 75.8 kg (167 lb)   01/18/22 74.8 kg (165 lb)   10/12/21 74.8 kg (165 lb)   07/06/21 72.6 kg (160 lb)   03/30/21 72.6 kg (160 lb)   09/15/20 72.6 kg (160 lb)   09/03/19 74.8 kg (165 lb)   04/30/19 74.8 kg (165 lb)        NUTRITION:  Diet:       Procedures    NPO        Percent Meals Eaten (last 3 days)       None            Food Allergies: No  Cultural/Ethnic/Latter-day Preferences Addressed: Yes    GI SYSTEM REVIEW:  +PEG ; last BM 6/6  Skin/Wounds: WNL    NUTRITION RELATED PHYSICAL FINDINGS:     1. Body Fat/Muscle Mass:  appears thin but is pt's baseline     2. Fluid Accumulation: none per RN documentation     NUTRITION PRESCRIPTION: 73.9 kg  Calories: 1176-8747 calories/day (30-40 kcal/kg)  Protein: 111-148 grams protein/day (1.5-2.0 gm/kg)  Fluid: ~1 ml/kcal or per MD discretion    NUTRITION DIAGNOSIS/PROBLEM:  Inadequate oral intake related to inability to take or tolerate as evidenced by need for NPO status and TF to meet nutrition needs.    MONITOR AND EVALUATE/NUTRITION GOALS:  Weight stable within 1 to 2 lbs during admission - New  Tolerate alternative nutrition at 100% of goal - New      MEDICATIONS:  Synthroid, protonix, abx  Gtt: D5W-NS at 100 ml/hr    LABS:  Reviewed     Pt is at High nutrition risk    Aura Saldaña, RD, LDN, CNSC  Clinical Dietitian  Spectra: [id number]   onset of her moderate to severe headache with food only   meloxicam (MOBIC) 15 mg tablet 2020 at Unknown time  No Yes   Sig: Take 1 tablet (15 mg total) by mouth daily as needed for moderate pain   metFORMIN (GLUCOPHAGE) 500 mg tablet 2020 at Unknown time  No Yes   Sig: TAKE 1 TABLET EVERY DAY WITH A MEAL   methocarbamol (ROBAXIN) 750 mg tablet Not Taking at Unknown time  No No   Sig: Take 1 tablet (750 mg total) by mouth 3 (three) times a day as needed for muscle spasms   Patient not taking: Reported on 10/3/2019   tiZANidine (ZANAFLEX) 2 mg tablet 2020 at Unknown time  No Yes   Si AT BEDTIME DAILY FOR NECK PAIN      Facility-Administered Medications: None       History reviewed  No pertinent past medical history  Past Surgical History:   Procedure Laterality Date     SECTION      CHOLECYSTECTOMY      TOTAL ABDOMINAL HYSTERECTOMY      Due to fibroids  Ovaries present       Family History   Problem Relation Age of Onset    Diabetes Mother     Hypertension Mother     Heart attack Mother     Stroke Father     No Known Problems Daughter     No Known Problems Maternal Grandmother     No Known Problems Maternal Grandfather     No Known Problems Paternal Grandmother     No Known Problems Paternal Grandfather     No Known Problems Maternal Aunt     No Known Problems Maternal Aunt     No Known Problems Maternal Aunt     No Known Problems Paternal Aunt     No Known Problems Paternal Aunt     No Known Problems Paternal Aunt     No Known Problems Paternal Aunt     No Known Problems Paternal Aunt      I have reviewed and agree with the history as documented  Social History     Tobacco Use    Smoking status: Never Smoker    Smokeless tobacco: Never Used   Substance Use Topics    Alcohol use: No    Drug use: No        Review of Systems   Constitutional: Negative for chills, diaphoresis, fatigue and fever     HENT: Negative for congestion, ear discharge, facial swelling, hearing loss, rhinorrhea, sinus pressure, sinus pain, sneezing, sore throat, tinnitus and trouble swallowing  Eyes: Negative for pain, discharge and redness  Respiratory: Negative for cough, choking, chest tightness, shortness of breath, wheezing and stridor  Cardiovascular: Negative for chest pain, palpitations and leg swelling  Gastrointestinal: Negative for abdominal distention, abdominal pain, blood in stool, constipation, diarrhea, nausea and vomiting  Endocrine: Negative for cold intolerance, polydipsia and polyuria  Genitourinary: Negative for difficulty urinating, dysuria, enuresis, flank pain, frequency and hematuria  Musculoskeletal: Positive for arthralgias  Negative for back pain, gait problem and neck stiffness  Skin: Negative for rash and wound  Neurological: Negative for dizziness, seizures, syncope, weakness, numbness and headaches  Hematological: Negative for adenopathy  Psychiatric/Behavioral: Negative for agitation, confusion, hallucinations, sleep disturbance and suicidal ideas  All other systems reviewed and are negative  Physical Exam  ED Triage Vitals [01/06/20 2025]   Temperature Pulse Respirations Blood Pressure SpO2   97 6 °F (36 4 °C) 85 16 109/83 96 %      Temp src Heart Rate Source Patient Position - Orthostatic VS BP Location FiO2 (%)   -- -- -- -- --      Pain Score       Worst Possible Pain             Orthostatic Vital Signs  Vitals:    01/06/20 2025   BP: 109/83   Pulse: 85       Physical Exam   Constitutional: She is oriented to person, place, and time  She appears well-developed and well-nourished  No distress  HENT:   Head: Normocephalic and atraumatic  Right Ear: External ear normal    Left Ear: External ear normal    Nose: No sinus tenderness  No epistaxis  Mouth/Throat: No oropharyngeal exudate  Eyes: Pupils are equal, round, and reactive to light  Conjunctivae and EOM are normal  Right eye exhibits no discharge  Left eye exhibits no discharge  Neck: No JVD present  Cardiovascular: Normal rate, regular rhythm, normal heart sounds and intact distal pulses  Exam reveals no gallop and no friction rub  No murmur heard  Pulmonary/Chest: Effort normal and breath sounds normal  No stridor  No respiratory distress  She has no wheezes  She has no rales  Abdominal: Soft  Bowel sounds are normal  She exhibits no distension and no mass  There is no tenderness  There is no rebound and no guarding  Musculoskeletal: Normal range of motion  She exhibits no edema, tenderness or deformity  Legs:  Lymphadenopathy:     She has no cervical adenopathy  Neurological: She is alert and oriented to person, place, and time  She has normal strength  No cranial nerve deficit or sensory deficit  GCS eye subscore is 4  GCS verbal subscore is 5  GCS motor subscore is 6  Reflex Scores:       Patellar reflexes are 2+ on the right side and 2+ on the left side  UE and LE 5/5 strength, No focal neuro deficits  Skin: Skin is warm, dry and intact  Capillary refill takes less than 2 seconds  She is not diaphoretic  Psychiatric: She has a normal mood and affect  Her speech is normal and behavior is normal  Judgment and thought content normal    Nursing note and vitals reviewed  ED Medications  Medications   ketorolac (TORADOL) injection 15 mg (15 mg Intramuscular Given 1/6/20 2228)       Diagnostic Studies  Results Reviewed     None                 XR knee 4+ views left injury   ED Interpretation by Samaria Sapp DO (01/06 2243)   No acute fractures or malalignment            Procedures  Procedures      ED Course                               MDM  Number of Diagnoses or Management Options  Left knee pain: new and requires workup  Diagnosis management comments: Will order x-rays throughout fracture  Patient given Toradol  Patient likely subluxed a left kneecap when she is sitting [place] [facility]) style when she extended her knee feel likely relocated      1  Left knee pain  -Motrin 600 mg q 6 hours as needed, knee immobilizer, crutches  -PCP follow-up  -sports medicine follow-up  -ED as needed        Disposition  Final diagnoses:   Left knee pain     Time reflects when diagnosis was documented in both MDM as applicable and the Disposition within this note     Time User Action Codes Description Comment    1/6/2020 10:44 PM Vel Lynch [Q40 822] Left knee pain       ED Disposition     ED Disposition Condition Date/Time Comment    Discharge Stable Mon Jan 6, 2020 10:44 PM Adrienne Garza discharge to home/self care  Follow-up Information     Follow up With Specialties Details Why Contact Info Additional Information    Forrest Lanes, MD Family Medicine Schedule an appointment as soon as possible for a visit in 1 day  [address]       [address] [place] [address] Emergency Department Emergency Medicine Go to  If symptoms worsen, As needed [address] [facility] ED, [address] [place] [address] 108          Patient's Medications   Discharge Prescriptions    IBUPROFEN (MOTRIN) 600 MG TABLET    Take 1 tablet (600 mg total) by mouth every 6 (six) hours as needed for moderate pain       Start Date: 1/6/2020  End Date: --       Order Dose: 600 mg       Quantity: 30 tablet    Refills: 0     No discharge procedures on file  ED Provider  Attending physically available and evaluated Adrienne Kayla AGARWAL managed the patient along with the ED Attending      Electronically Signed by         Sherman Feng DO  01/06/20 6368

## 2024-09-24 ENCOUNTER — OFFICE VISIT (OUTPATIENT)
Dept: FAMILY MEDICINE CLINIC | Facility: CLINIC | Age: 49
End: 2024-09-24
Payer: COMMERCIAL

## 2024-09-24 VITALS
WEIGHT: 172 LBS | RESPIRATION RATE: 16 BRPM | TEMPERATURE: 97.6 F | OXYGEN SATURATION: 99 % | SYSTOLIC BLOOD PRESSURE: 98 MMHG | HEIGHT: 66 IN | BODY MASS INDEX: 27.64 KG/M2 | HEART RATE: 83 BPM | DIASTOLIC BLOOD PRESSURE: 62 MMHG

## 2024-09-24 DIAGNOSIS — E78.1 HYPERTRIGLYCERIDEMIA: ICD-10-CM

## 2024-09-24 DIAGNOSIS — K76.0 FATTY LIVER DISEASE, NONALCOHOLIC: ICD-10-CM

## 2024-09-24 DIAGNOSIS — E11.9 TYPE 2 DIABETES MELLITUS WITHOUT COMPLICATION, WITHOUT LONG-TERM CURRENT USE OF INSULIN (HCC): Primary | ICD-10-CM

## 2024-09-24 DIAGNOSIS — Z12.31 ENCOUNTER FOR SCREENING MAMMOGRAM FOR MALIGNANT NEOPLASM OF BREAST: ICD-10-CM

## 2024-09-24 LAB
LEFT EYE DIABETIC RETINOPATHY: NORMAL
LEFT EYE IMAGE QUALITY: NORMAL
LEFT EYE MACULAR EDEMA: NORMAL
LEFT EYE OTHER RETINOPATHY: NORMAL
RIGHT EYE DIABETIC RETINOPATHY: NORMAL
RIGHT EYE IMAGE QUALITY: NORMAL
RIGHT EYE MACULAR EDEMA: NORMAL
RIGHT EYE OTHER RETINOPATHY: NORMAL
SEVERITY (EYE EXAM): NORMAL
SL AMB POCT HEMOGLOBIN AIC: 7.1 (ref ?–6.5)

## 2024-09-24 PROCEDURE — 83036 HEMOGLOBIN GLYCOSYLATED A1C: CPT | Performed by: FAMILY MEDICINE

## 2024-09-24 PROCEDURE — 99214 OFFICE O/P EST MOD 30 MIN: CPT | Performed by: FAMILY MEDICINE

## 2024-09-24 RX ORDER — METFORMIN HCL 500 MG
500 TABLET, EXTENDED RELEASE 24 HR ORAL 2 TIMES DAILY WITH MEALS
Start: 2024-09-24 | End: 2025-09-19

## 2024-09-24 NOTE — PATIENT INSTRUCTIONS
A1c 7.1.  Continue metformin 500 twice daily.  Begin Mounjaro 2.5 mg weekly.  Discussion of side effects which includes abdominal bloating, nausea, and diarrhea.  After 3 weeks, call for the next higher dose.  Consideration to increasing the dose of atorvastatin.  Flu shot will be received at work.  Prescription given for screening mammogram.  Recheck in 3 months.

## 2024-09-24 NOTE — PROGRESS NOTES
Chief Complaint   Patient presents with    Blood Sugar Problem     Increased. Discuss changing med        HPI   Here for follow-up of diabetes, hyperlipidemia, and fatty liver disease.  Concerned about her sugar.  At last visit, metformin increased to 500 mg twice daily.  Continues on 10 mg of atorvastatin.    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  Treated for 1 month and got side effects.  , declines further treatment.  Prefers to have a chest x-ray needed for her employment.    Migraine without aura and without status migrainosus, not intractable 10/03/2019    Type 2 diabetes mellitus without complication, without long-term current use of insulin (HCC) 2021        Past Surgical History:   Procedure Laterality Date     SECTION      CHOLECYSTECTOMY      TOTAL ABDOMINAL HYSTERECTOMY      Due to fibroids. Ovaries present       Social History     Tobacco Use    Smoking status: Never    Smokeless tobacco: Never   Substance Use Topics    Alcohol use: No       Social History     Social History Narrative     since . 3 children. 23,20, 18. 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      works for [employer].     Enjoys cooking.         The following portions of the patient's history were reviewed and updated as appropriate: allergies, current medications, past family history, past medical history, past social history, past surgical history, and problem list.      Review of Systems       BP 98/62 (BP Location:  "Left arm, Patient Position: Sitting, Cuff Size: Large)   Pulse 83   Temp 97.6 °F (36.4 °C) (Temporal)   Resp 16   Ht 5' 6\" (1.676 m)   Wt 78 kg (172 lb)   SpO2 99%   BMI 27.76 kg/m²      Physical Exam   Appears well.  Lungs are clear.  Heart regular.  Abdomen soft and nontender.  A1c 7.1.              Current Outpatient Medications:     acetaminophen (TYLENOL) 500 mg tablet, Take 500 mg by mouth as needed  , Disp: , Rfl:     atorvastatin (LIPITOR) 10 mg tablet, TAKE 1 TABLET BY MOUTH EVERY DAY, Disp: 90 tablet, Rfl: 1    ergocalciferol (ERGOCALCIFEROL) 1.25 MG (93432 UT) capsule, Take 1 tablet by mouth once a week, Disp: , Rfl:     fluticasone (FLONASE) 50 mcg/act nasal spray, 1 spray into each nostril daily, Disp: 48 g, Rfl: 3    FREESTYLE LITE test strip, USE TO TEST BLOOD SUGAR AS DIRECTED, Disp: 100 each, Rfl: 5    hydrocortisone (ANUSOL-HC) 2.5 % rectal cream, Apply topically 2 (two) times a day, Disp: 28 g, Rfl: 0    metFORMIN (GLUCOPHAGE-XR) 500 mg 24 hr tablet, Take 1 tablet (500 mg total) by mouth 2 (two) times a day with meals, Disp: , Rfl:     mometasone (ELOCON) 0.1 % lotion, Apply topically daily Apply 4 drops to ear canal as needed for itching, Disp: 60 mL, Rfl: 5    olopatadine (PATANOL) 0.1 % ophthalmic solution, Administer 1 drop to both eyes 2 (two) times a day, Disp: 5 mL, Rfl: 5    tirzepatide (Mounjaro) 2.5 MG/0.5ML, Inject 0.5 mL (2.5 mg total) under the skin every 7 days, Disp: 2 mL, Rfl: 0     No problem-specific Assessment & Plan notes found for this encounter.       Diagnoses and all orders for this visit:    Type 2 diabetes mellitus without complication, without long-term current use of insulin ([facility])  -     IRIS Diabetic eye exam  -     POCT hemoglobin A1c  -     Albumin / creatinine urine ratio  -     tirzepatide (Mounjaro) 2.5 MG/0.5ML; Inject 0.5 mL (2.5 mg total) under the skin every 7 days  -     metFORMIN (GLUCOPHAGE-XR) 500 mg 24 hr tablet; Take 1 tablet (500 mg total) by " mouth 2 (two) times a day with meals    Hypertriglyceridemia    Fatty liver disease, nonalcoholic    Encounter for screening mammogram for malignant neoplasm of breast  -     Mammo screening bilateral w 3d and cad; Future        Patient Instructions   A1c 7.1.  Continue metformin 500 twice daily.  Begin Mounjaro 2.5 mg weekly.  Discussion of side effects which includes abdominal bloating, nausea, and diarrhea.  After 3 weeks, call for the next higher dose.  Consideration to increasing the dose of atorvastatin.  Flu shot will be received at work.  Prescription given for screening mammogram.  Recheck in 3 months.

## 2024-09-25 ENCOUNTER — TELEPHONE (OUTPATIENT)
Age: 49
End: 2024-09-25

## 2024-09-25 NOTE — TELEPHONE ENCOUNTER
PA for MOUNJARO 2.5 MG/0.5 ML SUBMITTED     via    [x]CMM-KEY: [id number]  []Surescripts-Case ID #   []Faxed to plan   []Other website   []Phone call Case ID #     Office notes sent, clinical questions answered. Awaiting determination    Turnaround time for your insurance to make a decision on your Prior Authorization can take 7-21 business days.

## 2024-09-26 ENCOUNTER — TELEPHONE (OUTPATIENT)
Age: 49
End: 2024-09-26

## 2024-09-26 DIAGNOSIS — E11.9 TYPE 2 DIABETES MELLITUS WITHOUT COMPLICATION, WITHOUT LONG-TERM CURRENT USE OF INSULIN (HCC): Primary | ICD-10-CM

## 2024-09-26 NOTE — TELEPHONE ENCOUNTER
PA for Mounjaro 2.5 mg /0.5 ml  APPROVED     Date(s) approved 9/25/24-9/25/25    Case # [id number]    Patient advised by          [x][employer] Message  []Phone call   [x]LMOM  []L/M to call office as no active Communication consent on file  []Unable to leave detailed message as VM not approved on Communication consent       Pharmacy advised by    [x]Fax  []Phone call    Approval letter scanned into Media Yes

## 2024-09-26 NOTE — PROGRESS NOTES
Called patient and leave a message for patient to go back to the Lab to collect her Microalbumin. I contacted the lab and they states they do not received it. I canceled that order and today placed a new order.

## 2024-09-26 NOTE — TELEPHONE ENCOUNTER
Aniya called in from [facility]'s Lab regarding the urine test. She said that it was put on the packing list and removed from the packing list. It is on her unreceived report. She would like to know what is going on with it. Was the order put in by accident,  not able to void. Please call [phone] and anyone will be able to take down the information.

## 2024-09-26 NOTE — TELEPHONE ENCOUNTER
[facility] and spoke with Kelin. I canceled that order and I contacted patient to collect Urine again. New order is placed

## 2024-10-30 ENCOUNTER — TELEPHONE (OUTPATIENT)
Age: 49
End: 2024-10-30

## 2024-10-30 ENCOUNTER — OFFICE VISIT (OUTPATIENT)
Dept: FAMILY MEDICINE CLINIC | Facility: CLINIC | Age: 49
End: 2024-10-30
Payer: COMMERCIAL

## 2024-10-30 VITALS
SYSTOLIC BLOOD PRESSURE: 110 MMHG | RESPIRATION RATE: 17 BRPM | WEIGHT: 178.5 LBS | OXYGEN SATURATION: 98 % | BODY MASS INDEX: 28.81 KG/M2 | HEART RATE: 62 BPM | DIASTOLIC BLOOD PRESSURE: 70 MMHG | TEMPERATURE: 97.8 F

## 2024-10-30 DIAGNOSIS — E11.9 TYPE 2 DIABETES MELLITUS WITHOUT COMPLICATION, WITHOUT LONG-TERM CURRENT USE OF INSULIN (HCC): Primary | ICD-10-CM

## 2024-10-30 DIAGNOSIS — H81.10 BENIGN PAROXYSMAL POSITIONAL VERTIGO, UNSPECIFIED LATERALITY: ICD-10-CM

## 2024-10-30 DIAGNOSIS — M54.9 ACUTE LEFT-SIDED BACK PAIN, UNSPECIFIED BACK LOCATION: ICD-10-CM

## 2024-10-30 LAB
CREAT UR-MCNC: 26.6 MG/DL
MICROALBUMIN UR-MCNC: <7 MG/L

## 2024-10-30 PROCEDURE — 82570 ASSAY OF URINE CREATININE: CPT | Performed by: FAMILY MEDICINE

## 2024-10-30 PROCEDURE — 99214 OFFICE O/P EST MOD 30 MIN: CPT | Performed by: FAMILY MEDICINE

## 2024-10-30 PROCEDURE — 82043 UR ALBUMIN QUANTITATIVE: CPT | Performed by: FAMILY MEDICINE

## 2024-10-30 RX ORDER — MECLIZINE HCL 12.5 MG 12.5 MG/1
12.5 TABLET ORAL EVERY 8 HOURS PRN
Qty: 30 TABLET | Refills: 0 | Status: SHIPPED | OUTPATIENT
Start: 2024-10-30

## 2024-10-30 NOTE — PROGRESS NOTES
Ambulatory Visit  Name: Triny Torre      : 1975      MRN: [id number]  Encounter Provider: Jeff Jason DO  Encounter Date: 10/30/2024   Encounter department: [facility]    Assessment & Plan  Benign paroxysmal positional vertigo, unspecified laterality  Trial of Meclizine and PT. If not improved or worsening will follow up.  Orders:    meclizine (ANTIVERT) 12.5 MG tablet; Take 1 tablet (12.5 mg total) by mouth every 8 (eight) hours as needed for dizziness or nausea    Ambulatory Referral to Physical Therapy; Future    Acute left-sided back pain, unspecified back location  Recommend heating pad, massage, lidocaine patches - muscle tension in the right upper and middle trap. Follow up if not improved.  Orders:    Ambulatory Referral to Chiropractic; Future       History of Present Illness     HPI    Woke up this morning, feels dizzy when she sits from supine position feels like the room is spinning. Vision is not impaired, body feels like it is spinning. Pain in the upper back and neck, not new for her. Dizziness started this morning. Some nausea associated with it. Fasting blood sugar 125 this morning. No headache, no migraine, no recent medication changes. Has been working double shifts once a week, feels drained. Feels like head is spinning during the visit today.     Also having fingertip numbness on occasion but none at the moment. Gets numbness overnight while sleeping.       Review of Systems        Objective     /70   Pulse 62   Temp 97.8 °F (36.6 °C)   Resp 17   Wt 81 kg (178 lb 8 oz)   SpO2 98%   BMI 28.81 kg/m²     Physical Exam  Horizontal nystagmus on left gaze.  Hypertonic bilateral L > R upper trap and rhomboid.

## 2024-10-30 NOTE — TELEPHONE ENCOUNTER
Patient called in and wanted to schedule a F/U appt due to her new symptoms.   Patient stated that she is very dizzy and can not walk.     Patient saw Dr. Ford (consult APPT) 10/3/2019.     I explained to the patient that since its been 3 years she would be considered a new patient.     She said that she is going to speak to her PCP dr. Flores and then call us back.     I offered to create a new referral for her to start the scheduling process but she declined and said that she will call back.       NITA

## 2024-11-06 ENCOUNTER — TELEPHONE (OUTPATIENT)
Age: 49
End: 2024-11-06

## 2024-11-06 NOTE — TELEPHONE ENCOUNTER
Patient was calling to schedule with Chiro but has [facility]- Was unable top schedule closed referral and advised pt to reach out to her insurance to see if she has chiropractor benefits and where she can be seen. No further questions

## 2024-12-03 DIAGNOSIS — E11.9 TYPE 2 DIABETES MELLITUS WITHOUT COMPLICATION, WITHOUT LONG-TERM CURRENT USE OF INSULIN (HCC): ICD-10-CM

## 2024-12-04 RX ORDER — ATORVASTATIN CALCIUM 10 MG/1
10 TABLET, FILM COATED ORAL DAILY
Qty: 30 TABLET | Refills: 0 | Status: SHIPPED | OUTPATIENT
Start: 2024-12-04

## 2024-12-05 ENCOUNTER — TELEPHONE (OUTPATIENT)
Dept: NEUROSURGERY | Facility: CLINIC | Age: 49
End: 2024-12-05

## 2024-12-05 NOTE — TELEPHONE ENCOUNTER
12/05/2024- CALLED AND SPOKE WITH PT TO RESCHEDULE PT'S MISSED 11/27/2024 APT. PT STATED THAT THEY ARE NOT HOME AT THE MOMENT BUT WILL CALL BACK TO RESCHEDULE THEIR APT.

## 2024-12-18 ENCOUNTER — OFFICE VISIT (OUTPATIENT)
Dept: FAMILY MEDICINE CLINIC | Facility: CLINIC | Age: 49
End: 2024-12-18
Payer: COMMERCIAL

## 2024-12-18 VITALS
WEIGHT: 175 LBS | SYSTOLIC BLOOD PRESSURE: 126 MMHG | RESPIRATION RATE: 18 BRPM | HEART RATE: 81 BPM | BODY MASS INDEX: 28.25 KG/M2 | DIASTOLIC BLOOD PRESSURE: 84 MMHG | OXYGEN SATURATION: 100 %

## 2024-12-18 DIAGNOSIS — Z23 ENCOUNTER FOR IMMUNIZATION: ICD-10-CM

## 2024-12-18 DIAGNOSIS — T14.8XXA PUNCTURE WOUND: ICD-10-CM

## 2024-12-18 DIAGNOSIS — E11.9 TYPE 2 DIABETES MELLITUS WITHOUT COMPLICATION, WITHOUT LONG-TERM CURRENT USE OF INSULIN (HCC): ICD-10-CM

## 2024-12-18 DIAGNOSIS — J06.9 UPPER RESPIRATORY TRACT INFECTION, UNSPECIFIED TYPE: Primary | ICD-10-CM

## 2024-12-18 LAB
SARS-COV-2 AG UPPER RESP QL IA: NEGATIVE
SL AMB POCT RAPID FLU A: NEGATIVE
SL AMB POCT RAPID FLU B: NEGATIVE
VALID CONTROL: NORMAL

## 2024-12-18 PROCEDURE — 90471 IMMUNIZATION ADMIN: CPT

## 2024-12-18 PROCEDURE — 87804 INFLUENZA ASSAY W/OPTIC: CPT | Performed by: FAMILY MEDICINE

## 2024-12-18 PROCEDURE — 90715 TDAP VACCINE 7 YRS/> IM: CPT

## 2024-12-18 PROCEDURE — 87811 SARS-COV-2 COVID19 W/OPTIC: CPT | Performed by: FAMILY MEDICINE

## 2024-12-18 PROCEDURE — 99213 OFFICE O/P EST LOW 20 MIN: CPT | Performed by: FAMILY MEDICINE

## 2024-12-18 NOTE — LETTER
December 18, 2024     Patient: Triny Torre  YOB: 1975  Date of Visit: 12/18/2024      To Whom it May Concern:    Triny Torre is under my professional care. Triny was seen in my office on 12/18/2024. Triny may return to work on 12/25/24 . Please excuse her absence due to illness 12/17.    If you have any questions or concerns, please don't hesitate to call.         Sincerely,          Jeff Jason,         CC: No Recipients

## 2024-12-18 NOTE — PROGRESS NOTES
Name: Triny Torre      : 1975      MRN: [id number]  Encounter Provider: Jeff Jason DO  Encounter Date: 2024   Encounter department: [facility]  :  Assessment & Plan  Upper respiratory tract infection, unspecified type  COVID and Flu negative. Recommend symptom directed treatment and supportive care. Follow up if not improved.  Orders:  •  POCT Rapid Covid Ag  •  POCT rapid flu A and B    Encounter for immunization    Orders:  •  TDAP VACCINE GREATER THAN OR EQUAL TO 8YO IM    Puncture wound  Cut her finger with a knife the other day while carving [place]woca, requesting updated Tdap which was given today. No infection appearance, will monitor for signs and follow up as needed.              History of Present Illness     HPI  Three days of sore throat and fever with associated cough, congestion, and fatigue. Was working on the COVID unit recently. Yesterday was slightly improved but feels worse throughout the day.  Review of Systems    Objective   /84   Pulse 81   Resp 18   Wt 79.4 kg (175 lb)   SpO2 100%   BMI 28.25 kg/m²      Physical Exam  Vitals reviewed.   Constitutional:       Appearance: She is ill-appearing.   HENT:      Right Ear: Ear canal and external ear normal.      Left Ear: Tympanic membrane, ear canal and external ear normal.      Nose: Congestion and rhinorrhea present.      Mouth/Throat:      Mouth: Mucous membranes are moist.      Pharynx: Posterior oropharyngeal erythema present.   Eyes:      Extraocular Movements: Extraocular movements intact.      Conjunctiva/sclera: Conjunctivae normal.   Cardiovascular:      Rate and Rhythm: Normal rate and regular rhythm.      Heart sounds: Normal heart sounds.   Pulmonary:      Effort: Pulmonary effort is normal.      Breath sounds: Normal breath sounds.   Abdominal:      General: Abdomen is flat.   Skin:     General: Skin is warm and dry.      Comments: Healing laceration on the left index finger    Neurological:      Mental Status: She is alert.   Psychiatric:         Mood and Affect: Mood normal.         Behavior: Behavior normal.

## 2024-12-19 RX ORDER — ATORVASTATIN CALCIUM 10 MG/1
10 TABLET, FILM COATED ORAL DAILY
Qty: 90 TABLET | Refills: 1 | Status: SHIPPED | OUTPATIENT
Start: 2024-12-19

## 2025-06-16 ENCOUNTER — OFFICE VISIT (OUTPATIENT)
Dept: FAMILY MEDICINE CLINIC | Facility: CLINIC | Age: 50
End: 2025-06-16
Payer: COMMERCIAL

## 2025-06-16 VITALS
BODY MASS INDEX: 28.12 KG/M2 | OXYGEN SATURATION: 98 % | HEART RATE: 88 BPM | TEMPERATURE: 97.6 F | HEIGHT: 66 IN | SYSTOLIC BLOOD PRESSURE: 90 MMHG | WEIGHT: 175 LBS | RESPIRATION RATE: 16 BRPM | DIASTOLIC BLOOD PRESSURE: 70 MMHG

## 2025-06-16 DIAGNOSIS — E55.9 VITAMIN D DEFICIENCY: ICD-10-CM

## 2025-06-16 DIAGNOSIS — E53.8 B12 DEFICIENCY: ICD-10-CM

## 2025-06-16 DIAGNOSIS — M54.12 CERVICAL RADICULOPATHY: ICD-10-CM

## 2025-06-16 DIAGNOSIS — K76.0 FATTY LIVER DISEASE, NONALCOHOLIC: ICD-10-CM

## 2025-06-16 DIAGNOSIS — Z12.31 BREAST CANCER SCREENING BY MAMMOGRAM: ICD-10-CM

## 2025-06-16 DIAGNOSIS — E78.2 MIXED HYPERLIPIDEMIA: ICD-10-CM

## 2025-06-16 DIAGNOSIS — E11.9 TYPE 2 DIABETES MELLITUS WITHOUT COMPLICATION, WITHOUT LONG-TERM CURRENT USE OF INSULIN (HCC): ICD-10-CM

## 2025-06-16 DIAGNOSIS — Z00.00 PHYSICAL EXAM, ANNUAL: Primary | ICD-10-CM

## 2025-06-16 DIAGNOSIS — J30.2 SEASONAL ALLERGIES: ICD-10-CM

## 2025-06-16 DIAGNOSIS — G89.29 CHRONIC PAIN OF BOTH SHOULDERS: ICD-10-CM

## 2025-06-16 DIAGNOSIS — M25.511 CHRONIC PAIN OF BOTH SHOULDERS: ICD-10-CM

## 2025-06-16 DIAGNOSIS — M25.512 CHRONIC PAIN OF BOTH SHOULDERS: ICD-10-CM

## 2025-06-16 PROCEDURE — 99396 PREV VISIT EST AGE 40-64: CPT | Performed by: NURSE PRACTITIONER

## 2025-06-16 PROCEDURE — 99214 OFFICE O/P EST MOD 30 MIN: CPT | Performed by: NURSE PRACTITIONER

## 2025-06-16 RX ORDER — FLUTICASONE PROPIONATE 50 MCG
1 SPRAY, SUSPENSION (ML) NASAL DAILY
Qty: 48 G | Refills: 3 | Status: SHIPPED | OUTPATIENT
Start: 2025-06-16

## 2025-06-16 RX ORDER — METFORMIN HYDROCHLORIDE 500 MG/1
500 TABLET, EXTENDED RELEASE ORAL 2 TIMES DAILY WITH MEALS
Qty: 180 TABLET | Refills: 3 | Status: SHIPPED | OUTPATIENT
Start: 2025-06-16 | End: 2026-06-11

## 2025-06-16 RX ORDER — OLOPATADINE HYDROCHLORIDE 1 MG/ML
1 SOLUTION OPHTHALMIC 2 TIMES DAILY
Qty: 5 ML | Refills: 5 | Status: SHIPPED | OUTPATIENT
Start: 2025-06-16

## 2025-06-16 RX ORDER — ATORVASTATIN CALCIUM 10 MG/1
10 TABLET, FILM COATED ORAL DAILY
Qty: 90 TABLET | Refills: 3 | Status: SHIPPED | OUTPATIENT
Start: 2025-06-16

## 2025-06-16 NOTE — ASSESSMENT & PLAN NOTE
Lab Results   Component Value Date    HGBA1C 7.1 (A) 09/24/2024     Due for repeat A1c and blood work as noted.   Currently taking metformin once daily.     Orders:  •  Albumin / creatinine urine ratio; Future  •  CBC and differential; Future  •  Comprehensive metabolic panel; Future  •  Hemoglobin A1C; Future  •  TSH, 3rd generation; Future  •  atorvastatin (LIPITOR) 10 mg tablet; Take 1 tablet (10 mg total) by mouth daily  •  metFORMIN (GLUCOPHAGE-XR) 500 mg 24 hr tablet; Take 1 tablet (500 mg total) by mouth 2 (two) times a day with meals

## 2025-06-16 NOTE — ASSESSMENT & PLAN NOTE
Orders:  •  fluticasone (FLONASE) 50 mcg/act nasal spray; 1 spray into each nostril daily  •  olopatadine (PATANOL) 0.1 % ophthalmic solution; Administer 1 drop to both eyes 2 (two) times a day

## 2025-06-16 NOTE — PROGRESS NOTES
Name: Triny Torre      : 1975      MRN: [id number]  Encounter Provider: CLAYTON Loaiza  Encounter Date: 2025   Encounter department: [facility]  :  Assessment & Plan  Physical exam, annual  Overdue for mammogram, agreeable to complete.   Colon cancer screening up to date.   Declines vaccines.   Discussed importance of regular physical activity and healthy eating.        Cervical radiculopathy  Triny has chronic neck and shoulder pain, with greatest symptoms on the left.   MRI of cervical spine 23:   Multilevel cervical degenerative disc disease similar to 2021 with no new disc herniation. Diffuse disc osteophyte complexes are again noted at the C3-4 through the C6-7 levels with persistent cord impingement without cord signal abnormality to   suggest myelomalacia or edema. Persistent moderate to severe left C5-6 and C6-7 neural foraminal narrowing.    Left upper extremity weakness on exam today.   She saw neurosurgery in 2023, at this time referred for PT and Spine and pain center evaluation.   She does not desire to go to spine and pain center.   She would like to go to PT. Did not complete this before due to cost, new insurance now.   I also recommend follow up with neurosurgery, as they had wanted her to return in one year for recheck.     Orders:  •  Ambulatory Referral to Neurosurgery; Future  •  Ambulatory Referral to Physical Therapy; Future    Chronic pain of both shoulders  This is likely originating from the neck.  She would like to make sure PT focuses on her neck and shoulders.     Orders:  •  Ambulatory Referral to Physical Therapy; Future    Type 2 diabetes mellitus without complication, without long-term current use of insulin ([facility])    Lab Results   Component Value Date    HGBA1C 7.1 (A) 2024     Due for repeat A1c and blood work as noted.   Currently taking metformin once daily.     Orders:  •  Albumin / creatinine urine ratio;  Future  •  CBC and differential; Future  •  Comprehensive metabolic panel; Future  •  Hemoglobin A1C; Future  •  TSH, 3rd generation; Future  •  atorvastatin (LIPITOR) 10 mg tablet; Take 1 tablet (10 mg total) by mouth daily  •  metFORMIN (GLUCOPHAGE-XR) 500 mg 24 hr tablet; Take 1 tablet (500 mg total) by mouth 2 (two) times a day with meals    Mixed hyperlipidemia  Last lipid panel 2023 with , triglycerides 282, HDL 49, .   Currently on atorvastatin 10 mg daily.   Repeat lipid panel, would like to see LDL closer to 70 due to diabetes.     Orders:  •  Lipid Panel with Direct LDL reflex; Future    Fatty liver disease, nonalcoholic  Hepatic steatosis noted on CT abdomen pelvis w contrast 2/20/24.   Stable weight with BMI 28.25.   Consider US elastography in the future.          B12 deficiency    Orders:  •  Vitamin B12; Future    Vitamin D deficiency    Orders:  •  Vitamin D 25 hydroxy; Future    Seasonal allergies    Orders:  •  fluticasone (FLONASE) 50 mcg/act nasal spray; 1 spray into each nostril daily  •  olopatadine (PATANOL) 0.1 % ophthalmic solution; Administer 1 drop to both eyes 2 (two) times a day    Breast cancer screening by mammogram    Orders:  •  Mammo screening bilateral w 3d and cad; Future          Depression Screening and Follow-up Plan: Patient was screened for depression during today's encounter. They screened negative with a PHQ-2 score of 0.        History of Present Illness   Triny Torre is a 50 year old female presenting today for neck pain and annual physical exam.     Hisotroy of cervical radiculopathy.   Resulting in arms feeling heavy, tired.  Left hand numbness and tingling.     She has been to neurosurgery in 2023 who referred her to PT and [facility].   Declined spine and pain center injections.   Did not complete PT due to cost in the past.   Insurance is better now.   Would like to try PT.     Traveling to [place] next month.    Works-- CNA  Not the best job  "given cervical radiculopathy, she does not desire to change jobs.    Hysterectomy in her 30's,  one ovary left in place.     Cold often.    Taking calcium supplement.   Vitamin D3 5000 units --Not taking every day.  B12  Fish oil    Taknig metformin and atorvastain.  Taking metformin once daily.     Knees hurt, this is chronic.     Declines shingrix and prevnar 20.    Exercise: no formal.   Is very active working as a CNA and doing housework.   Always moving.     Eating well balanced diet.   Does not eat out, has increased nuts in her diet.   Sleep is good.   Hearing is good.   Vision is good.     Seasonal Allergies taking Zyrtec.     Flonase: uses when she has it.         Review of Systems   Constitutional:  Positive for fatigue. Negative for activity change, appetite change, chills, diaphoresis, fever and unexpected weight change.   HENT: Negative.     Respiratory: Negative.     Cardiovascular: Negative.    Gastrointestinal: Negative.    Genitourinary: Negative.    Musculoskeletal:  Positive for arthralgias and neck pain.   Neurological: Negative.    Hematological: Negative.    Psychiatric/Behavioral: Negative.         Objective   BP 90/70   Pulse 88   Temp 97.6 °F (36.4 °C) (Temporal)   Resp 16   Ht 5' 6\" (1.676 m)   Wt 79.4 kg (175 lb)   SpO2 98%   BMI 28.25 kg/m²      Physical Exam  Vitals and nursing note reviewed.   Constitutional:       General: She is not in acute distress.     Appearance: Normal appearance. She is not ill-appearing.   HENT:      Head: Normocephalic and atraumatic.      Right Ear: Tympanic membrane and ear canal normal.      Left Ear: Tympanic membrane and ear canal normal.      Mouth/Throat:      Mouth: Mucous membranes are moist.      Pharynx: Oropharynx is clear.     Eyes:      Conjunctiva/sclera: Conjunctivae normal.      Pupils: Pupils are equal, round, and reactive to light.     Neck:      Thyroid: No thyromegaly.     Cardiovascular:      Rate and Rhythm: Normal rate and " regular rhythm.      Pulses: no weak pulses.           Dorsalis pedis pulses are 2+ on the right side and 2+ on the left side.        Posterior tibial pulses are 2+ on the right side and 2+ on the left side.      Heart sounds: No murmur heard.  Pulmonary:      Effort: Pulmonary effort is normal. No respiratory distress.      Breath sounds: Normal breath sounds. No wheezing or rales.   Abdominal:      General: Bowel sounds are normal.     Musculoskeletal:         General: No deformity.      Cervical back: Normal range of motion and neck supple. No swelling, deformity, spasms or tenderness. Pain with movement present. Normal range of motion.      Right lower leg: No edema.      Left lower leg: No edema.      Comments: Appearance: left shoulder slightly raised higher than left shoulder.    Feet:      Right foot:      Skin integrity: Callus (great toe and first MTP) present. No ulcer, skin breakdown, erythema, warmth or dry skin.      Left foot:      Skin integrity: Callus (great toe and MTP) present. No ulcer, skin breakdown, erythema, warmth or dry skin.   Lymphadenopathy:      Cervical: No cervical adenopathy.     Skin:     General: Skin is warm and dry.      Findings: No rash.     Neurological:      Mental Status: She is alert.      Gait: Gait normal.      Comments: Sensation to upper extremities is intact.   Hand grasps are equal bilaterally.   Left shoulder abduction and flexion 4/5.   Right shoulder abduction and flexion 5/5    Psychiatric:         Mood and Affect: Mood normal.           Diabetic Foot Exam    Patient's shoes and socks removed.    Right Foot/Ankle   Right Foot Inspection  Skin Exam: skin normal, skin intact, callus (great toe and first MTP) and callus (great toe and first MTP). No dry skin, no warmth, no erythema, no maceration, no abnormal color, no pre-ulcer and no ulcer.     Toe Exam: ROM and strength within normal limits.     Sensory   Proprioception: intact  Monofilament testing:  intact    Vascular  Capillary refills: < 3 seconds  The right DP pulse is 2+. The right PT pulse is 2+.     Left Foot/Ankle  Left Foot Inspection  Skin Exam: skin normal, skin intact and callus (great toe and MTP). No dry skin, no warmth, no erythema, no maceration, normal color, no pre-ulcer and no ulcer.     Toe Exam: ROM and strength within normal limits.     Sensory   Proprioception: intact  Monofilament testing: intact    Vascular  Capillary refills: < 3 seconds  The left DP pulse is 2+. The left PT pulse is 2+.     Assign Risk Category  No deformity present  Loss of protective sensation  No weak pulses  Risk: 0

## 2025-06-16 NOTE — ASSESSMENT & PLAN NOTE
Hepatic steatosis noted on CT abdomen pelvis w contrast 2/20/24.   Stable weight with BMI 28.25.   Consider US elastography in the future.

## 2025-06-16 NOTE — ASSESSMENT & PLAN NOTE
Triny has chronic neck and shoulder pain, with greatest symptoms on the left.   MRI of cervical spine 11/26/23:   Multilevel cervical degenerative disc disease similar to 11/12/2021 with no new disc herniation. Diffuse disc osteophyte complexes are again noted at the C3-4 through the C6-7 levels with persistent cord impingement without cord signal abnormality to   suggest myelomalacia or edema. Persistent moderate to severe left C5-6 and C6-7 neural foraminal narrowing.    Left upper extremity weakness on exam today.   She saw neurosurgery in November 2023, at this time referred for PT and Spine and pain center evaluation.   She does not desire to go to spine and pain center.   She would like to go to PT. Did not complete this before due to cost, new insurance now.   I also recommend follow up with neurosurgery, as they had wanted her to return in one year for recheck.     Orders:  •  Ambulatory Referral to Neurosurgery; Future  •  Ambulatory Referral to Physical Therapy; Future

## 2025-06-17 NOTE — ASSESSMENT & PLAN NOTE
Last lipid panel 2023 with , triglycerides 282, HDL 49, .   Currently on atorvastatin 10 mg daily.   Repeat lipid panel, would like to see LDL closer to 70 due to diabetes.     Orders:  •  Lipid Panel with Direct LDL reflex; Future

## 2025-08-19 ENCOUNTER — OFFICE VISIT (OUTPATIENT)
Dept: NEUROSURGERY | Facility: CLINIC | Age: 50
End: 2025-08-19
Attending: NURSE PRACTITIONER
Payer: COMMERCIAL

## 2025-08-19 VITALS
HEART RATE: 71 BPM | OXYGEN SATURATION: 98 % | HEIGHT: 66 IN | WEIGHT: 172 LBS | DIASTOLIC BLOOD PRESSURE: 62 MMHG | SYSTOLIC BLOOD PRESSURE: 106 MMHG | TEMPERATURE: 98.2 F | BODY MASS INDEX: 27.64 KG/M2

## 2025-08-19 DIAGNOSIS — M50.30 DDD (DEGENERATIVE DISC DISEASE), CERVICAL: Primary | ICD-10-CM

## 2025-08-19 DIAGNOSIS — M54.12 CERVICAL RADICULOPATHY: ICD-10-CM

## 2025-08-19 PROCEDURE — 99215 OFFICE O/P EST HI 40 MIN: CPT | Performed by: PHYSICIAN ASSISTANT
